# Patient Record
Sex: FEMALE | Race: BLACK OR AFRICAN AMERICAN | Employment: OTHER | URBAN - METROPOLITAN AREA
[De-identification: names, ages, dates, MRNs, and addresses within clinical notes are randomized per-mention and may not be internally consistent; named-entity substitution may affect disease eponyms.]

---

## 2017-01-20 ENCOUNTER — OFFICE VISIT (OUTPATIENT)
Dept: FAMILY MEDICINE CLINIC | Age: 67
End: 2017-01-20

## 2017-01-20 VITALS
SYSTOLIC BLOOD PRESSURE: 116 MMHG | WEIGHT: 221.25 LBS | TEMPERATURE: 97.5 F | HEIGHT: 59 IN | BODY MASS INDEX: 44.6 KG/M2 | HEART RATE: 82 BPM | DIASTOLIC BLOOD PRESSURE: 71 MMHG | RESPIRATION RATE: 16 BRPM

## 2017-01-20 DIAGNOSIS — E55.9 VITAMIN D DEFICIENCY: ICD-10-CM

## 2017-01-20 DIAGNOSIS — E78.5 HYPERLIPIDEMIA, UNSPECIFIED HYPERLIPIDEMIA TYPE: ICD-10-CM

## 2017-01-20 DIAGNOSIS — R60.0 BILATERAL EDEMA OF LOWER EXTREMITY: ICD-10-CM

## 2017-01-20 DIAGNOSIS — I10 ESSENTIAL HYPERTENSION: ICD-10-CM

## 2017-01-20 DIAGNOSIS — R73.01 ELEVATED FASTING BLOOD SUGAR: ICD-10-CM

## 2017-01-20 DIAGNOSIS — A53.9 SYPHILIS: Primary | ICD-10-CM

## 2017-01-20 DIAGNOSIS — I89.0 LYMPHEDEMA: ICD-10-CM

## 2017-01-20 DIAGNOSIS — Z51.81 MEDICATION MONITORING ENCOUNTER: ICD-10-CM

## 2017-01-20 RX ORDER — ASPIRIN 81 MG/1
TABLET ORAL DAILY
COMMUNITY
End: 2021-07-13 | Stop reason: ALTCHOICE

## 2017-01-20 NOTE — PROGRESS NOTES
HISTORY OF PRESENT ILLNESS  Leela Gómez is a 74 y.o. female. Chief Complaint   Patient presents with    Follow-up     1 month f/u on syphillis. She was seen in consult by ID.   got injections x 3 for tx for syhilis  Ongoing edema  htn chronic problem, stable. Reports compliance with meds        HPI  Past Medical History   Diagnosis Date    Asthma     Hypertension     Osteoarthritis of ankle     Right ankle pain 2008     posterior tibial tendon interstitial tear    Right foot pain 2011     hindfoot arthrosis    Sleep disorder      Current Outpatient Prescriptions   Medication Sig Dispense Refill    aspirin delayed-release 81 mg tablet Take  by mouth daily.  furosemide (LASIX) 40 mg tablet Take 1 Tab by mouth daily. 30 Tab 3    potassium chloride 20 mEq TbER Take 1 Tab by mouth daily. 30 Tab 3    lisinopril (PRINIVIL, ZESTRIL) 20 mg tablet Take 1 Tab by mouth daily. 30 Tab 3    acetaminophen (TYLENOL ARTHRITIS) 650 mg CR tablet Take 650 mg by mouth every six (6) hours as needed for Pain. No Known Allergies    ROS Respiratory: Negative for shortness of breath. Cardiovascular: Negative for chest pain. Genitourinary: Negative for frequency. Neurological: Negative for dizziness and headaches. Visit Vitals    /71 (BP 1 Location: Left arm, BP Patient Position: Sitting)    Pulse 82    Temp 97.5 °F (36.4 °C) (Oral)    Resp 16    Ht 4' 10.5\" (1.486 m)    Wt 221 lb 4 oz (100.4 kg)    BMI 45.45 kg/m2       Physical Exam  Nursing note and vitals reviewed. Constitutional: She is oriented to person, place, and time. She appears well-developed and well-nourished. No distress. HENT:   Mouth/Throat: Oropharynx is clear and moist.   Neck: No JVD present. No thyromegaly present. Cardiovascular: Normal rate, regular rhythm and normal heart sounds. Pulmonary/Chest: Effort normal and breath sounds normal. No respiratory distress. She has no wheezes. She has no rales.    Musculoskeletal: She exhibits chronic 2+ edema. Lymphadenopathy:     She has no cervical adenopathy. Psychiatric: She has a normal mood and affect. Her behavior is normal.      ASSESSMENT and PLAN    ICD-10-CM ICD-9-CM    1. Syphilis A53.9 097.9    2. Essential hypertension stable continue current medications  D01 928.6 METABOLIC PANEL, COMPREHENSIVE   3. Hyperlipidemia, unspecified hyperlipidemia type Check labs on follow up   U05.4 972.8 METABOLIC PANEL, COMPREHENSIVE      LIPID PANEL   4. Vitamin D deficiency Check labs on follow up   O33.4 079.2 METABOLIC PANEL, COMPREHENSIVE   5. Elevated fasting blood sugar H93.68 097.90 METABOLIC PANEL, COMPREHENSIVE      HEMOGLOBIN A1C W/O EAG   6. Lymphedema chronic  I89.0 457.1 REFERRAL TO VASCULAR SURGERY   7. Bilateral edema of lower extremity R60.0 782.3 REFERRAL TO VASCULAR SURGERY   8. Medication monitoring encounter O90.62 O37.85 METABOLIC PANEL, COMPREHENSIVE      LIPID PANEL   Follow-up Disposition:  Return in about 1 month (around 2/20/2017).     HEMOGLOBIN A1C W/O EAG

## 2017-01-20 NOTE — MR AVS SNAPSHOT
Visit Information Date & Time Provider Department Dept. Phone Encounter #  
 1/20/2017 10:45 AM Jace Seth MD [facility] [phone] [id number] Follow-up Instructions Return in about 1 month (around 2/20/2017). Upcoming Health Maintenance Date Due Hepatitis C Screening 1950 DTaP/Tdap/Td series (1 - Tdap) 1/17/1973 PAP AKA CERVICAL CYTOLOGY 1/17/1973 BREAST CANCER SCRN MAMMOGRAM 4/25/2016 GLAUCOMA SCREENING Q2Y 1/17/2017 OSTEOPOROSIS SCREENING (DEXA) 1/17/2017 Pneumococcal 65+ Low/Medium Risk (1 of 2 - PCV13) 1/17/2017 MEDICARE YEARLY EXAM 11/22/2017 COLONOSCOPY 8/25/2025 Allergies as of 1/20/2017  Review Complete On: 1/20/2017 By: Jace Seth MD  
 No Known Allergies Current Immunizations  Reviewed on 11/21/2016 Name Date Influenza Vaccine (Quad) PF 9/28/2016 Influenza Vaccine PF 8/26/2014 Zoster Vaccine, Live 12/3/2014 Not reviewed this visit You Were Diagnosed With   
  
 Codes Comments Syphilis    -  Primary ICD-10-CM: A53.9 ICD-9-CM: 097.9 Essential hypertension     ICD-10-CM: I10 
ICD-9-CM: 401.9 Hyperlipidemia, unspecified hyperlipidemia type     ICD-10-CM: E78.5 ICD-9-CM: 272.4 Vitamin D deficiency     ICD-10-CM: E55.9 ICD-9-CM: 268.9 Elevated fasting blood sugar     ICD-10-CM: R73.01 
ICD-9-CM: 790.21 Lymphedema     ICD-10-CM: I89.0 ICD-9-CM: 502.0 Bilateral edema of lower extremity     ICD-10-CM: R60.0 ICD-9-CM: 566. 3 Medication monitoring encounter     ICD-10-CM: Z51.81 
ICD-9-CM: V58.83 Vitals BP Pulse Temp Resp Height(growth percentile) Weight(growth percentile) 116/71 (BP 1 Location: Left arm, BP Patient Position: Sitting) 82 97.5 °F (36.4 °C) (Oral) 16 4' 10.5\" (1.486 m) 221 lb 4 oz (100.4 kg) BMI OB Status Smoking Status 45.45 kg/m2 Postmenopausal Never Smoker BMI and BSA Data Body Mass Index Body Surface Area 45.45 kg/m 2 2.04 m 2 Preferred Pharmacy Pharmacy Name Phone [facility] [address], [address] Your Updated Medication List  
  
   
This list is accurate as of: 1/20/17 11:30 AM.  Always use your most recent med list.  
  
  
  
  
 aspirin delayed-release 81 mg tablet Take  by mouth daily. furosemide 40 mg tablet Commonly known as:  LASIX Take 1 Tab by mouth daily. lisinopril 20 mg tablet Commonly known as:  Merilynn [place] Take 1 Tab by mouth daily. potassium chloride SR 20 mEq tablet Commonly known as:  K-TAB Take 1 Tab by mouth daily. TYLENOL ARTHRITIS 650 mg CR tablet Generic drug:  acetaminophen Take 650 mg by mouth every six (6) hours as needed for Pain. We Performed the Following REFERRAL TO VASCULAR SURGERY [CLR089 Custom] Comments:  
 Please evaluate patient for lymphedema. [place] near [facility] Follow-up Instructions Return in about 1 month (around 2/20/2017). To-Do List   
 04/20/2017 Lab:  HEMOGLOBIN A1C W/O EAG   
  
 04/20/2017 Lab:  LIPID PANEL   
  
 04/20/2017 Lab:  METABOLIC PANEL, COMPREHENSIVE Referral Information Referral ID Referred By Referred To  
  
 [id number] Crys Squires MD   
   [address] [facility] [address]   
   [facility] [place], [address] Phone: [phone] Fax: [phone] Visits Status Start Date End Date 1 New Request 1/20/17 1/20/18 If your referral has a status of pending review or denied, additional information will be sent to support the outcome of this decision. Patient Instructions Please contact our office if you have any questions about your visit today. Introducing [facility] & HEALTH SERVICES!    
 Manny Prajapati introduces [employer] patient portal. Now you can access parts of your medical record, email your doctor's office, and request medication refills online. 1. In your internet browser, go to [email or web address] 2. Click on the First Time User? Click Here link in the Sign In box. You will see the New Member Sign Up page. 3. Enter your [employer] Access Code exactly as it appears below. You will not need to use this code after youve completed the sign-up process. If you do not sign up before the expiration date, you must request a new code. · [employer] Access Code: [email or web address] Expires: 4/20/2017  9:37 AM 
 
4. Enter the last four digits of your Social Security Number (xxxx) and Date of Birth (mm/dd/yyyy) as indicated and click Submit. You will be taken to the next sign-up page. 5. Create a [employer] ID. This will be your [employer] login ID and cannot be changed, so think of one that is secure and easy to remember. 6. Create a [employer] password. You can change your password at any time. 7. Enter your Password Reset Question and Answer. This can be used at a later time if you forget your password. 8. Enter your e-mail address. You will receive e-mail notification when new information is available in [address] 9. Click Sign Up. You can now view and download portions of your medical record. 10. Click the Download Summary menu link to download a portable copy of your medical information. If you have questions, please visit the Frequently Asked Questions section of the [employer] website. Remember, [employer] is NOT to be used for urgent needs. For medical emergencies, dial 911. Now available from your iPhone and Android! Please provide this summary of care documentation to your next provider. Your primary care clinician is listed as RIN PINEDO. If you have any questions after today's visit, please call [phone].

## 2017-01-20 NOTE — PROGRESS NOTES
Chief Complaint   Patient presents with    Follow-up     1 month f/u on syphillis. She was seen in consult by ID. Health Maintenance reviewed     1. Have you been to the ER, urgent care clinic since your last visit? Hospitalized since your last visit? No    2. Have you seen or consulted any other health care providers outside of the [address] [place] [address] since your last visit? Include any pap smears or colon screening.  Yes When: 1/17 Where: ID Reason for visit: ov

## 2017-02-22 ENCOUNTER — OFFICE VISIT (OUTPATIENT)
Dept: FAMILY MEDICINE CLINIC | Age: 67
End: 2017-02-22

## 2017-02-22 VITALS
WEIGHT: 218.5 LBS | TEMPERATURE: 96.4 F | RESPIRATION RATE: 20 BRPM | DIASTOLIC BLOOD PRESSURE: 76 MMHG | SYSTOLIC BLOOD PRESSURE: 119 MMHG | BODY MASS INDEX: 44.05 KG/M2 | HEIGHT: 59 IN | HEART RATE: 81 BPM

## 2017-02-22 DIAGNOSIS — I10 ESSENTIAL HYPERTENSION: Primary | ICD-10-CM

## 2017-02-22 DIAGNOSIS — Z23 ENCOUNTER FOR IMMUNIZATION: ICD-10-CM

## 2017-02-22 DIAGNOSIS — E78.5 HYPERLIPIDEMIA, UNSPECIFIED HYPERLIPIDEMIA TYPE: ICD-10-CM

## 2017-02-22 DIAGNOSIS — A53.9 SYPHILIS: ICD-10-CM

## 2017-02-22 DIAGNOSIS — F99 PSYCHIATRIC DISTURBANCE: ICD-10-CM

## 2017-02-22 DIAGNOSIS — R73.03 PREDIABETES: ICD-10-CM

## 2017-02-22 DIAGNOSIS — R60.0 BILATERAL LEG EDEMA: ICD-10-CM

## 2017-02-22 NOTE — MR AVS SNAPSHOT
Visit Information Date & Time Provider Department Dept. Phone Encounter #  
 2/22/2017  9:00 AM Carson [facility]hton[phone] Follow-up Instructions Return in about 1 month (around 3/22/2017). Upcoming Health Maintenance Date Due Hepatitis C Screening 1950 DTaP/Tdap/Td series (1 - Tdap) 1/17/1973 BREAST CANCER SCRN MAMMOGRAM 4/25/2016 GLAUCOMA SCREENING Q2Y 1/17/2017 OSTEOPOROSIS SCREENING (DEXA) 1/17/2017 Pneumococcal 65+ Low/Medium Risk (1 of 2 - PCV13) 1/17/2017 MEDICARE YEARLY EXAM 11/22/2017 COLONOSCOPY 8/25/2025 Allergies as of 2/22/2017  Review Complete On: 2/22/2017 By: Carson Ashton MD  
 No Known Allergies Current Immunizations  Reviewed on 11/21/2016 Name Date Influenza Vaccine (Quad) PF 9/28/2016 Influenza Vaccine PF 8/26/2014 Zoster Vaccine, Live 12/3/2014 Not reviewed this visit You Were Diagnosed With   
  
 Codes Comments Essential hypertension    -  Primary ICD-10-CM: I10 
ICD-9-CM: 401.9 Syphilis     ICD-10-CM: A53.9 ICD-9-CM: 097.9 Psychiatric disturbance     ICD-10-CM: F99 
ICD-9-CM: 300.9 Bilateral leg edema     ICD-10-CM: R60.0 ICD-9-CM: 782.3 Prediabetes     ICD-10-CM: R73.03 
ICD-9-CM: 790.29 Hyperlipidemia, unspecified hyperlipidemia type     ICD-10-CM: E78.5 ICD-9-CM: 272.4 Vitals BP  
  
  
  
  
  
 119/76 (BP 1 Location: Right arm, BP Patient Position: Sitting) BMI and BSA Data Body Mass Index Body Surface Area 44.89 kg/m 2 2.02 m 2 Preferred Pharmacy Pharmacy Name Phone [facility] [address], [address] [facility] Your Updated Medication List  
  
   
This list is accurate as of: 2/22/17 10:15 AM.  Always use your most recent med list.  
  
  
  
  
 aspirin delayed-release 81 mg tablet Take  by mouth daily. furosemide 40 mg tablet Commonly known as:  LASIX Take 1 Tab by mouth daily. lisinopril 20 mg tablet Commonly known as:  Bre Loss Take 1 Tab by mouth daily. potassium chloride SR 20 mEq tablet Commonly known as:  K-TAB Take 1 Tab by mouth daily. TYLENOL ARTHRITIS 650 mg CR tablet Generic drug:  acetaminophen Take 650 mg by mouth every six (6) hours as needed for Pain. We Performed the Following REFERRAL TO OPHTHALMOLOGY [REF57 Custom] Comments:  
 Please evaluate patient for eye exam, cataract, glaucoma ck, htn, hyperlipidemia, prediabetes, h/o syphilis. Follow-up Instructions Return in about 1 month (around 3/22/2017). Referral Information Referral ID Referred By Referred To  
  
 [id number] Erick PINEDO [facility], [address] Phone: [phone] Fax: [phone] Visits Status Start Date End Date 1 New Request 2/22/17 2/22/18 If your referral has a status of pending review or denied, additional information will be sent to support the outcome of this decision. Patient Instructions Please contact our office if you have any questions about your visit today. Introducing [facility] & HEALTH SERVICES! Elda Smalls introduces [employer] patient portal. Now you can access parts of your medical record, email your doctor's office, and request medication refills online. 1. In your internet browser, go to [email or web address] 2. Click on the First Time User? Click Here link in the Sign In box. You will see the New Member Sign Up page. 3. Enter your [employer] Access Code exactly as it appears below. You will not need to use this code after youve completed the sign-up process. If you do not sign up before the expiration date, you must request a new code. · [employer] Access Code: [email or web address] Expires: 4/20/2017  9:37 AM 
 
 4. Enter the last four digits of your Social Security Number (xxxx) and Date of Birth (mm/dd/yyyy) as indicated and click Submit. You will be taken to the next sign-up page. 5. Create a Moon[employer]erk ID. This will be your [employer] login ID and cannot be changed, so think of one that is secure and easy to remember. 6. Create a [employer] password. You can change your password at any time. 7. Enter your Password Reset Question and Answer. This can be used at a later time if you forget your password. 8. Enter your e-mail address. You will receive e-mail notification when new information is available in [address] 9. Click Sign Up. You can now view and download portions of your medical record. 10. Click the Download Summary menu link to download a portable copy of your medical information. If you have questions, please visit the Frequently Asked Questions section of the [employer] website. Remember, [employer] is NOT to be used for urgent needs. For medical emergencies, dial 911. Now available from your iPhone and Android! Please provide this summary of care documentation to your next provider. Your primary care clinician is listed as RIN PINEDO. If you have any questions after today's visit, please call [phone].

## 2017-02-22 NOTE — PROGRESS NOTES
Patient given VIS information and signed consent form. Immunization/s administered, patient tolerated procedure well. There was no reaction noted after observed for 5 minutes.

## 2017-02-22 NOTE — PROGRESS NOTES
Chief Complaint   Patient presents with    Hypertension       Health Maintenance reviewed     1. Have you been to the ER, urgent care clinic since your last visit? Hospitalized since your last visit? No    2. Have you seen or consulted any other health care providers outside of the [address] [place] [address] since your last visit? Include any pap smears or colon screening.  No

## 2017-02-22 NOTE — PROGRESS NOTES
HISTORY OF PRESENT ILLNESS  Leela Gómez is a 74 y.o. female. Chief Complaint   Patient presents with    Hypertension chronic problem, stable    Syphilis new problem treated per ID with inj x 3  Edema ,chronic problem, stable on lasix, no leg cramping, on k Reports compliance with meds       HPI  Past Medical History:   Diagnosis Date    Asthma     Hypertension     Osteoarthritis of ankle     Right ankle pain 2008    posterior tibial tendon interstitial tear    Right foot pain 2011    hindfoot arthrosis    Sleep disorder      Current Outpatient Prescriptions   Medication Sig Dispense Refill    aspirin delayed-release 81 mg tablet Take  by mouth daily.  furosemide (LASIX) 40 mg tablet Take 1 Tab by mouth daily. 30 Tab 3    potassium chloride 20 mEq TbER Take 1 Tab by mouth daily. 30 Tab 3    lisinopril (PRINIVIL, ZESTRIL) 20 mg tablet Take 1 Tab by mouth daily. 30 Tab 3    acetaminophen (TYLENOL ARTHRITIS) 650 mg CR tablet Take 650 mg by mouth every six (6) hours as needed for Pain. No Known Allergies    ROS Respiratory: Negative for shortness of breath. Cardiovascular: Negative for chest pain. Genitourinary: Negative for frequency. Neurological: Negative for dizziness and headaches. Visit Vitals    /76 (BP 1 Location: Right arm, BP Patient Position: Sitting)    Pulse 81    Temp 96.4 °F (35.8 °C) (Oral)    Resp 20    Ht 4' 10.5\" (1.486 m)    Wt 218 lb 8 oz (99.1 kg)    BMI 44.89 kg/m2       Physical Exam Nursing note and vitals reviewed. Constitutional: She is oriented to person, place, and time. She appears well-developed and well-nourished. No distress. HENT:   Mouth/Throat: Oropharynx is clear and moist.   Neck: No JVD present. No thyromegaly present. Cardiovascular: Normal rate, regular rhythm and normal heart sounds. Pulmonary/Chest: Effort normal and breath sounds normal. No respiratory distress. She has no wheezes. She has no rales.    Musculoskeletal: She exhibits 2+ edema. Lymphadenopathy:     She has no cervical adenopathy. Neurological: She is alert and oriented to person, place, and time. Coordination normal.   Psychiatric: She has a normal mood and affect. Her behavior is normal.    Results for orders placed or performed in visit on 09/28/16   CBC WITH AUTOMATED DIFF   Result Value Ref Range    WBC 3.4 (L) 4.0 - 11.0 K/uL    RBC 5.04 3.80 - 5.20 M/uL    HGB 11.6 (L) 11.7 - 16.0 g/dL    HCT 37.4 35.1 - 48.0 %    MCV 74 (L) 80 - 95 fL    MCH 23 (L) 26 - 34 pg    MCHC 31 (L) 32 - 36 g/dL    RDW 15.8 10.0 - 16.0 %    PLATELET 791 721 - 405 K/uL    MPV 10.6 6.0 - 10.8 fL    NEUTROPHILS 54 40 - 75 %    Lymphocytes 35 27 - 45 %    MONOCYTES 8 3 - 9 %    EOSINOPHILS 3 0 - 6 %    BASOPHILS 0 0 - 2 %    ABS. NEUTROPHILS 1.8 1.8 - 7.7 K/uL    ABSOLUTE LYMPHOCYTE COUNT 1.2 1.0 - 4.8 K/uL    ABS. MONOCYTES 0.3 0.1 - 0.9 K/uL    ABS. EOSINOPHILS 0.1 0.0 - 0.5 K/uL    ABS. BASOPHILS 0.0 0.0 - 0.2 K/uL   METABOLIC PANEL, COMPREHENSIVE   Result Value Ref Range    Glucose 91 65 - 99 mg/dL    BUN 20 6 - 22 mg/dL    Creatinine 0.8 0.8 - 1.4 mg/dL    Sodium 143 133 - 145 mmol/L    Potassium 3.6 3.5 - 5.5 mmol/L    Chloride 106 98 - 110 mmol/L    CO2 25 20 - 32 mmol/L    AST (SGOT) 16 10 - 37 U/L    ALT (SGPT) 14 5 - 40 U/L    Alk.  phosphatase 86 40 - 120 U/L    Bilirubin, total 0.3 0.2 - 1.2 mg/dL    Calcium 8.9 8.4 - 10.5 mg/dL    Protein, total 7.3 6.2 - 8.1 g/dL    Albumin 3.9 3.5 - 5.0 g/dL    A-G Ratio 1.1 1.1 - 2.6 ratio    Globulin 3.4 2.0 - 4.0 g/dL    Anion gap 11.9 mmol/L    GFRAA >60.0 >60.0    GFRNA >60.0 >60.0   LIPID PANEL   Result Value Ref Range    Triglyceride 51 40 - 149 mg/dL    HDL Cholesterol 84 (H) 40 - 59 mg/dL    Cholesterol, total 224 (H) 110 - 200 mg/dL    LDL, calculated 130 (H) 50 - 99 mg/dL    VLDL, calculated 10 8 - 30 mg/dL   VITAMIN D, 25 HYDROXY   Result Value Ref Range    VITAMIN D, 25-HYDROXY 32.1 32.0 - 100.0 ng/mL   TSH, 3RD GENERATION Result Value Ref Range    TSH 1.09 0.27 - 4.20 mcU/mL   HEMOGLOBIN A1C W/O EAGSTIMATION   Result Value Ref Range    Hemoglobin A1c 6.4 (H) 4.8 - 5.9 %    AVG  (H) 91 - 123 mg/dL       ASSESSMENT and PLAN    ICD-10-CM ICD-9-CM    1. Essential hypertension . stable continue current medications  I10 401.9 REFERRAL TO OPHTHALMOLOGY   2. Syphilis treated ,asymptomatic at present A53.9 097.9    3. Psychiatric disturbance stable at present no active sx F99 300.9    4. Bilateral leg edema chronic stable R60.0 782.3    5. Prediabetes R73.03 790.29 REFERRAL TO OPHTHALMOLOGY   6. Hyperlipidemia, unspecified hyperlipidemia type Check labs on follow up   E78.5 272.4 REFERRAL TO OPHTHALMOLOGY   7. Encounter for immunization Z23 V03.89 PNEUMOCOCCAL CONJ VACCINE 13 VALENT IM      ADMIN PNEUMOCOCCAL VACCINE   Follow-up Disposition:  Return in about 1 month (around 3/22/2017).  Check labs on follow up  Ordered FLP CMP HBA1C

## 2017-03-07 ENCOUNTER — HOSPITAL ENCOUNTER (OUTPATIENT)
Dept: LAB | Age: 67
Discharge: HOME | End: 2017-03-07
Payer: MEDICARE

## 2017-03-07 DIAGNOSIS — R73.01 ELEVATED FASTING BLOOD SUGAR: ICD-10-CM

## 2017-03-07 DIAGNOSIS — E55.9 VITAMIN D DEFICIENCY: ICD-10-CM

## 2017-03-07 DIAGNOSIS — I10 ESSENTIAL HYPERTENSION: ICD-10-CM

## 2017-03-07 DIAGNOSIS — E78.5 HYPERLIPIDEMIA, UNSPECIFIED HYPERLIPIDEMIA TYPE: ICD-10-CM

## 2017-03-07 DIAGNOSIS — Z51.81 MEDICATION MONITORING ENCOUNTER: ICD-10-CM

## 2017-03-07 LAB
ALBUMIN SERPL BCP-MCNC: 3.4 G/DL (ref 3.4–5)
ALBUMIN/GLOB SERPL: 0.9 {RATIO} (ref 0.8–1.7)
ALP SERPL-CCNC: 102 U/L (ref 45–117)
ALT SERPL-CCNC: 27 U/L (ref 13–56)
ANION GAP BLD CALC-SCNC: 8 MMOL/L (ref 3–18)
AST SERPL W P-5'-P-CCNC: 17 U/L (ref 15–37)
BILIRUB SERPL-MCNC: 0.3 MG/DL (ref 0.2–1)
BUN SERPL-MCNC: 17 MG/DL (ref 7–18)
BUN/CREAT SERPL: 16 (ref 12–20)
CALCIUM SERPL-MCNC: 8.9 MG/DL (ref 8.5–10.1)
CHLORIDE SERPL-SCNC: 111 MMOL/L (ref 100–108)
CHOLEST SERPL-MCNC: 210 MG/DL
CO2 SERPL-SCNC: 26 MMOL/L (ref 21–32)
CREAT SERPL-MCNC: 1.05 MG/DL (ref 0.6–1.3)
GLOBULIN SER CALC-MCNC: 3.6 G/DL (ref 2–4)
GLUCOSE SERPL-MCNC: 84 MG/DL (ref 74–99)
HBA1C MFR BLD: 6.4 % (ref 4.2–5.6)
HDLC SERPL-MCNC: 77 MG/DL (ref 40–60)
HDLC SERPL: 2.7 {RATIO} (ref 0–5)
LDLC SERPL CALC-MCNC: 117.2 MG/DL (ref 0–100)
LIPID PROFILE,FLP: ABNORMAL
POTASSIUM SERPL-SCNC: 4 MMOL/L (ref 3.5–5.5)
PROT SERPL-MCNC: 7 G/DL (ref 6.4–8.2)
SODIUM SERPL-SCNC: 145 MMOL/L (ref 136–145)
TRIGL SERPL-MCNC: 79 MG/DL (ref ?–150)
VLDLC SERPL CALC-MCNC: 15.8 MG/DL

## 2017-03-07 PROCEDURE — 80053 COMPREHEN METABOLIC PANEL: CPT | Performed by: FAMILY MEDICINE

## 2017-03-07 PROCEDURE — 80061 LIPID PANEL: CPT | Performed by: FAMILY MEDICINE

## 2017-03-07 PROCEDURE — 36415 COLL VENOUS BLD VENIPUNCTURE: CPT | Performed by: FAMILY MEDICINE

## 2017-03-07 PROCEDURE — 83036 HEMOGLOBIN GLYCOSYLATED A1C: CPT | Performed by: FAMILY MEDICINE

## 2017-03-22 ENCOUNTER — OFFICE VISIT (OUTPATIENT)
Dept: FAMILY MEDICINE CLINIC | Age: 67
End: 2017-03-22

## 2017-03-22 VITALS
HEART RATE: 81 BPM | WEIGHT: 226.25 LBS | BODY MASS INDEX: 45.61 KG/M2 | HEIGHT: 59 IN | RESPIRATION RATE: 20 BRPM | SYSTOLIC BLOOD PRESSURE: 120 MMHG | DIASTOLIC BLOOD PRESSURE: 74 MMHG | TEMPERATURE: 97.1 F

## 2017-03-22 DIAGNOSIS — A53.0 LATENT SYPHILIS: ICD-10-CM

## 2017-03-22 DIAGNOSIS — E78.5 HYPERLIPIDEMIA, UNSPECIFIED HYPERLIPIDEMIA TYPE: Primary | ICD-10-CM

## 2017-03-22 DIAGNOSIS — I10 ESSENTIAL HYPERTENSION: ICD-10-CM

## 2017-03-22 DIAGNOSIS — I89.0 LYMPHEDEMA: ICD-10-CM

## 2017-03-22 DIAGNOSIS — R73.03 PREDIABETES: ICD-10-CM

## 2017-03-22 NOTE — PROGRESS NOTES
HISTORY OF PRESENT ILLNESS  Leela Gómez is a 74 y.o. female. 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  furosemide (LASIX) 40 mg tablet Take 1 Tab by mouth daily. 30 Tab 3    potassium chloride 20 mEq TbER Take 1 Tab by mouth daily. 30 Tab 3    lisinopril (PRINIVIL, ZESTRIL) 20 mg tablet Take 1 Tab by mouth daily. 30 Tab 3    acetaminophen (TYLENOL ARTHRITIS) 650 mg CR tablet Take 650 mg by mouth every six (6) hours as needed for Pain. No Known Allergies    ROS Respiratory: Negative for shortness of breath. Cardiovascular: Negative for chest pain. Genitourinary: Negative for frequency. Neurological: Negative for dizziness and headaches. Visit Vitals    /74 (BP 1 Location: Left arm, BP Patient Position: Sitting)    Pulse 81    Temp 97.1 °F (36.2 °C) (Oral)    Resp 20    Ht 4' 10.5\" (1.486 m)    Wt 226 lb 4 oz (102.6 kg)    BMI 46.48 kg/m2       Physical Exam  Nursing note and vitals reviewed. Constitutional: She is oriented to person, place, and time. She appears well-developed and well-nourished. No distress. HENT:   Mouth/Throat: Oropharynx is clear and moist.   Neck: No JVD present. No thyromegaly present. Cardiovascular: Normal rate, regular rhythm and normal heart sounds.     Pulmonary/Chest: Effort normal and breath sounds normal. No respiratory distress. She has no wheezes. She has no rales. Musculoskeletal: She exhibits 2+ edema. Lymphadenopathy:     She has no cervical adenopathy. Neurological: She is alert and oriented to person, place, and time. Coordination normal.   Psychiatric: She has a normal mood and affect. Her behavior is normal.       ASSESSMENT and PLAN    ICD-10-CM ICD-9-CM    1. Hyperlipidemia, unspecified hyperlipidemia type E78.5 272.4 uncontrolled but improved work on diet   2. Essential hypertension I10 401.9  stable continue current medications    3. Prediabetes  R73.03 790.29  diabetes mellitus risk watch diet monitor   4. 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 Lymphedema I89.0 457.1 Refused vascular referral, on lasix, monitor   Follow-up Disposition:  Return in about 3 months (around 6/22/2017).

## 2017-03-22 NOTE — MR AVS SNAPSHOT
Visit Information Date & Time Provider Department Dept. Phone Encounter #  
 3/22/2017  8:45 AM Demetri Waldron MD [address] [phone] Follow-up Instructions Return in about 3 months (around 6/22/2017). Upcoming Health Maintenance Date Due Hepatitis C Screening 1950 DTaP/Tdap/Td series (1 - Tdap) 1/17/1973 BREAST CANCER SCRN MAMMOGRAM 4/25/2016 GLAUCOMA SCREENING Q2Y 1/17/2017 OSTEOPOROSIS SCREENING (DEXA) 1/17/2017 MEDICARE YEARLY EXAM 11/22/2017 Pneumococcal 65+ Low/Medium Risk (2 of 2 - PPSV23) 2/22/2018 COLONOSCOPY 8/25/2025 Allergies as of 3/22/2017  Review Complete On: 2/22/2017 By: Demetri Waldron MD  
 No Known Allergies Current Immunizations  Reviewed on 11/21/2016 Name Date Influenza Vaccine (Quad) PF 9/28/2016 Influenza Vaccine PF 8/26/2014 Pneumococcal Conjugate (PCV-13) 2/22/2017 Zoster Vaccine, Live 12/3/2014 Not reviewed this visit You Were Diagnosed With   
  
 Codes Comments Hyperlipidemia, unspecified hyperlipidemia type    -  Primary ICD-10-CM: E78.5 ICD-9-CM: 272.4 Essential hypertension     ICD-10-CM: I10 
ICD-9-CM: 401.9 Prediabetes     ICD-10-CM: R73.03 
ICD-9-CM: 790.29 Latent syphilis     ICD-10-CM: A53.0 ICD-9-CM: 097.1 Lymphedema     ICD-10-CM: I89.0 ICD-9-CM: 567.3 Vitals BP Pulse Temp Resp Height(growth percentile) Weight(growth percentile) 120/74 (BP 1 Location: Left arm, BP Patient Position: Sitting) 81 97.1 °F (36.2 °C) (Oral) 20 4' 10.5\" (1.486 m) 226 lb 4 oz (102.6 kg) BMI OB Status Smoking Status 46.48 kg/m2 Postmenopausal Never Smoker BMI and BSA Data Body Mass Index Body Surface Area  
 46.48 kg/m 2 2.06 m 2 Preferred Pharmacy Pharmacy Name Phone [facility] [address], [address] [facility] Your Updated Medication List  
  
   
 This list is accurate as of: 3/22/17  9:50 AM.  Always use your most recent med list.  
  
  
  
  
 aspirin delayed-release 81 mg tablet Take  by mouth daily. furosemide 40 mg tablet Commonly known as:  LASIX Take 1 Tab by mouth daily. lisinopril 20 mg tablet Commonly known as:  Julio Shutters Take 1 Tab by mouth daily. potassium chloride SR 20 mEq tablet Commonly known as:  K-TAB Take 1 Tab by mouth daily. TYLENOL ARTHRITIS 650 mg CR tablet Generic drug:  acetaminophen Take 650 mg by mouth every six (6) hours as needed for Pain. Follow-up Instructions Return in about 3 months (around 6/22/2017). Patient Instructions Please contact our office if you have any questions about your visit today. Introducing [facility] & [facility]! [employer] introduces [employer] patient portal. Now you can access parts of your medical record, email your doctor's office, and request medication refills online. 1. In your internet browser, go to [email or web address] 2. Click on the First Time User? Click Here link in the Sign In box. You will see the New Member Sign Up page. 3. Enter your [employer] Access Code exactly as it appears below. You will not need to use this code after youve completed the sign-up process. If you do not sign up before the expiration date, you must request a new code. · [employer] Access Code: [email or web address] Expires: 4/20/2017 10:37 AM 
 
4. Enter the last four digits of your Social Security Number (xxxx) and Date of Birth (mm/dd/yyyy) as indicated and click Submit. You will be taken to the next sign-up page. 5. Create a [employer] ID. This will be your [employer] login ID and cannot be changed, so think of one that is secure and easy to remember. 6. Create a [employer] password. You can change your password at any time. 7. Enter your Password Reset Question and Answer.  This can be used at a later time if you forget your password. 8. Enter your e-mail address. You will receive e-mail notification when new information is available in [address] 9. Click Sign Up. You can now view and download portions of your medical record. 10. Click the Download Summary menu link to download a portable copy of your medical information. If you have questions, please visit the Frequently Asked Questions section of the Avaz website. Remember, [employer] is NOT to be used for urgent needs. For medical emergencies, dial 911. Now available from your iPhone and Android! Please provide this summary of care documentation to your next provider. Your primary care clinician is listed as RIN PINEDO. If you have any questions after today's visit, please call [phone].

## 2017-03-22 NOTE — PROGRESS NOTES
Chief Complaint   Patient presents with    Results     discuss lab results       Health Maintenance reviewed     1. Have you been to the ER, urgent care clinic since your last visit? Hospitalized since your last visit? No    2. Have you seen or consulted any other health care providers outside of the [address] [place] [address] since your last visit? Include any pap smears or colon screening.  Yes When: 3/17 Where: [place] Reason for visit: ov

## 2017-04-20 ENCOUNTER — OFFICE VISIT (OUTPATIENT)
Dept: FAMILY MEDICINE CLINIC | Age: 67
End: 2017-04-20

## 2017-04-20 VITALS
TEMPERATURE: 98.4 F | HEART RATE: 99 BPM | WEIGHT: 227.25 LBS | SYSTOLIC BLOOD PRESSURE: 116 MMHG | RESPIRATION RATE: 20 BRPM | BODY MASS INDEX: 45.81 KG/M2 | HEIGHT: 59 IN | DIASTOLIC BLOOD PRESSURE: 73 MMHG

## 2017-04-20 DIAGNOSIS — N39.0 URINARY TRACT INFECTION WITHOUT HEMATURIA, SITE UNSPECIFIED: ICD-10-CM

## 2017-04-20 DIAGNOSIS — E87.6 DIURETIC-INDUCED HYPOKALEMIA: ICD-10-CM

## 2017-04-20 DIAGNOSIS — R60.0 BILATERAL LEG EDEMA: ICD-10-CM

## 2017-04-20 DIAGNOSIS — T50.2X5A DIURETIC-INDUCED HYPOKALEMIA: ICD-10-CM

## 2017-04-20 DIAGNOSIS — I10 ESSENTIAL HYPERTENSION: ICD-10-CM

## 2017-04-20 DIAGNOSIS — F25.0 SCHIZOAFFECTIVE DISORDER, BIPOLAR TYPE (HCC): Primary | ICD-10-CM

## 2017-04-20 LAB
BILIRUB UR QL STRIP: NEGATIVE
GLUCOSE UR-MCNC: NEGATIVE MG/DL
KETONES P FAST UR STRIP-MCNC: NEGATIVE MG/DL
PH UR STRIP: 5.5 [PH] (ref 4.6–8)
PROT UR QL STRIP: NEGATIVE MG/DL
SP GR UR STRIP: 1.01 (ref 1–1.03)
UA UROBILINOGEN AMB POC: NORMAL (ref 0.2–1)
URINALYSIS CLARITY POC: CLEAR
URINALYSIS COLOR POC: YELLOW
URINE BLOOD POC: NEGATIVE
URINE LEUKOCYTES POC: NEGATIVE
URINE NITRITES POC: NEGATIVE

## 2017-04-20 RX ORDER — POTASSIUM CHLORIDE 1500 MG/1
20 TABLET, FILM COATED, EXTENDED RELEASE ORAL DAILY
Qty: 30 TAB | Refills: 3 | Status: SHIPPED | OUTPATIENT
Start: 2017-04-20 | End: 2018-01-17 | Stop reason: SDUPTHER

## 2017-04-20 NOTE — PROGRESS NOTES
Chief Complaint   Patient presents with   [facility] Follow Up     Seen at [facility] ER on 4/3/17 for schizphrenic episode       Health Maintenance reviewed     1. Have you been to the ER, urgent care clinic since your last visit? Hospitalized since your last visit? Yes When: 4/17 Where: [facility] ER Reason for visit: schizophrenia    2. Have you seen or consulted any other health care providers outside of the [address] [place] [address] since your last visit? Include any pap smears or colon screening.  No

## 2017-04-20 NOTE — MR AVS SNAPSHOT
Visit Information Date & Time Provider Department Dept. Phone Encounter #  
 4/20/2017  3:00 PM Fabricio Sanders MD [address] 02 [phone] Follow-up Instructions Return in about 1 month (around 5/20/2017). Your Appointments 6/22/2017 10:00 AM  
Follow Up with Fabricio Sanders MD  
[address] (--) Appt Note: bharath Rojas [address] [address] [address] [address] [phone]  
  
   
 [place] [address] [phone] [address] [address] Upcoming Health Maintenance Date Due Hepatitis C Screening 1950 DTaP/Tdap/Td series (1 - Tdap) 1/17/1973 BREAST CANCER SCRN MAMMOGRAM 4/25/2016 GLAUCOMA SCREENING Q2Y 1/17/2017 OSTEOPOROSIS SCREENING (DEXA) 1/17/2017 MEDICARE YEARLY EXAM 11/22/2017 Pneumococcal 65+ Low/Medium Risk (2 of 2 - PPSV23) 2/22/2018 COLONOSCOPY 8/25/2025 Allergies as of 4/20/2017  Review Complete On: 4/20/2017 By: Fabricio Sanders MD  
 No Known Allergies Current Immunizations  Reviewed on 11/21/2016 Name Date Influenza Vaccine (Quad) PF 9/28/2016 Influenza Vaccine PF 8/26/2014 Pneumococcal Conjugate (PCV-13) 2/22/2017 Zoster Vaccine, Live 12/3/2014 Not reviewed this visit You Were Diagnosed With   
  
 Codes Comments Schizoaffective disorder, bipolar type ([facility]    -  Primary ICD-10-CM: F25.0 ICD-9-CM: 295.70 Bilateral leg edema     ICD-10-CM: R60.0 ICD-9-CM: 782.3 Urinary tract infection without hematuria, site unspecified     ICD-10-CM: N39.0 ICD-9-CM: 599.0 Diuretic-induced hypokalemia     ICD-10-CM: E87.6, T50.2X5A 
ICD-9-CM: 276.8, E944.4 Essential hypertension     ICD-10-CM: I10 
ICD-9-CM: 401.9 Vitals BP Pulse Temp Resp Height(growth percentile) Weight(growth percentile) 116/73 (BP 1 Location: Right arm, BP Patient Position: Sitting) 99 98.4 °F (36.9 °C) (Oral) 20 4' 10.5\" (1.486 m) 227 lb 4 oz (103.1 kg) BMI OB Status Smoking Status 46.69 kg/m2 Postmenopausal Never Smoker BMI and BSA Data Body Mass Index Body Surface Area  
 46.69 kg/m 2 2.06 m 2 Preferred Pharmacy Pharmacy Name Phone [facility] [address], [address] Your Updated Medication List  
  
   
This list is accurate as of: 4/20/17  4:05 PM.  Always use your most recent med list.  
  
  
  
  
 aspirin delayed-release 81 mg tablet Take  by mouth daily. furosemide 40 mg tablet Commonly known as:  LASIX Take 1 Tab by mouth daily. lisinopril 20 mg tablet Commonly known as:  Rigo  Take 1 Tab by mouth daily. potassium chloride SR 20 mEq tablet Commonly known as:  K-TAB Take 1 Tab by mouth daily. TYLENOL ARTHRITIS 650 mg CR tablet Generic drug:  acetaminophen Take 650 mg by mouth every six (6) hours as needed for Pain. Prescriptions Sent to Pharmacy Refills  
 potassium chloride SR (K-TAB) 20 mEq tablet 3 Sig: Take 1 Tab by mouth daily. Class: Normal  
 Pharmacy: [facility] [address] Ph #: [phone] Route: Oral  
  
We Performed the Following AMB POC URINALYSIS DIP STICK AUTO W/O MICRO [00787 CPT(R)] Follow-up Instructions Return in about 1 month (around 5/20/2017). To-Do List   
 04/20/2017 Microbiology:  CULTURE, URINE Patient Instructions Please contact our office if you have any questions about your visit today. Introducing [address]! [employer] introduces [employer] patient portal. Now you can access parts of your medical record, email your doctor's office, and request medication refills online. 1. In your internet browser, go to [email or web address] 2. Click on the First Time User? Click Here link in the Sign In box. You will see the New Member Sign Up page. 3. Enter your [employer] Access Code exactly as it appears below. You will not need to use this code after youve completed the sign-up process. If you do not sign up before the expiration date, you must request a new code. · [employer] Access Code: [email or web address] Expires: 7/19/2017  4:05 PM 
 
4. Enter the last four digits of your Social Security Number (xxxx) and Date of Birth (mm/dd/yyyy) as indicated and click Submit. You will be taken to the next sign-up page. 5. Create a [employer]emanta ID. This will be your Zemanta login ID and cannot be changed, so think of one that is secure and easy to remember. 6. Create a [employer] password. You can change your password at any time. 7. Enter your Password Reset Question and Answer. This can be used at a later time if you forget your password. 8. Enter your e-mail address. You will receive e-mail notification when new information is available in [address] 9. Click Sign Up. You can now view and download portions of your medical record. 10. Click the Download Summary menu link to download a portable copy of your medical information. If you have questions, please visit the Frequently Asked Questions section of the Zemanta website. Remember, Zemanta is NOT to be used for urgent needs. For medical emergencies, dial 911. Now available from your iPhone and Android! Please provide this summary of care documentation to your next provider. Your primary care clinician is listed as RIN PINEDO. If you have any questions after today's visit, please call [phone].

## 2017-04-20 NOTE — PROGRESS NOTES
HISTORY OF PRESENT ILLNESS  Leela Gómez is a 74 y.o. female. Chief Complaint   Patient presents with   [facility] Follow Up     Seen at [place] [address] ER on 4/3/17 for schizphrenic episode    patient was taken to the emergency room by her family who states that she has severe problems with psychiatric issues. She was seen at [facility] found to have a urinary tract infection started on Keflex but subsequently transferred to [facility] where she stayed for 12 days. She was discharged with a diagnosis of schizoaffective disorder but no new medications were prescribed  I last refilled her medications many months ago and she still has medication and referrals left all the bottles that she brings in today. She is complaining of increased swelling but her Lasix still has 2 referrals on it refilled many months ago. 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  furosemide (LASIX) 40 mg tablet Take 1 Tab by mouth daily. 30 Tab 3    potassium chloride 20 mEq TbER Take 1 Tab by mouth daily. 30 Tab 3    lisinopril (PRINIVIL, ZESTRIL) 20 mg tablet Take 1 Tab by mouth daily. 30 Tab 3    acetaminophen (TYLENOL ARTHRITIS) 650 mg CR tablet Take 650 mg by mouth every six (6) hours as needed for Pain. No Known Allergies    Review of Systems   Respiratory: Negative for shortness of breath. Cardiovascular: Positive for leg swelling. Psychiatric/Behavioral: Negative for depression. The patient is not nervous/anxious.       Visit Vitals    /73 (BP 1 Location: Right arm, BP Patient Position: Sitting)    Pulse 99    Temp 98.4 °F (36.9 °C) (Oral)  Resp 20    Ht 4' 10.5\" (1.486 m)    Wt 227 lb 4 oz (103.1 kg)    BMI 46.69 kg/m2       Physical Exam   Constitutional: She appears well-developed and well-nourished. No distress. HENT:   Mouth/Throat: Oropharynx is clear and moist.   Neck: No JVD present. Cardiovascular: Normal rate, regular rhythm and normal heart sounds. Pulmonary/Chest: Effort normal and breath sounds normal. No respiratory distress. She has no wheezes. She has no rales. Musculoskeletal: She exhibits edema (2-3+ ankle and pedal). She exhibits no tenderness. Lymphadenopathy:     She has no cervical adenopathy. Neurological: Coordination normal.   Psychiatric: She has a normal mood and affect. Her behavior is normal.   Nursing note and vitals reviewed. Results for orders placed or performed in visit on 04/20/17   AMB POC URINALYSIS DIP STICK AUTO W/O MICRO   Result Value Ref Range    Color (UA POC) Yellow     Clarity (UA POC) Clear     Glucose (UA POC) Negative Negative    Bilirubin (UA POC) Negative Negative    Ketones (UA POC) Negative Negative    Specific gravity (UA POC) 1.010 1.001 - 1.035    Blood (UA POC) Negative Negative    pH (UA POC) 5.5 4.6 - 8.0    Protein (UA POC) Negative Negative mg/dL    Urobilinogen (UA POC) 0.2 mg/dL 0.2 - 1    Nitrites (UA POC) Negative Negative    Leukocyte esterase (UA POC) Negative Negative         ASSESSMENT and PLAN    ICD-10-CM ICD-9-CM    1. Schizoaffective disorder, bipolar type (HCC)Patient refusing psychiatry referral or appointments F25.0 295.70    2. Bilateral leg edema uncontrolled encourage compliance with medication apparently she has not been been taking her medications she should take her Lasix once daily along with her potassium    R60.0 782.3    3. Urinary tract infection without hematuria, site unspecified resolved posttreatment   N39.0 599.0 AMB POC URINALYSIS DIP STICK AUTO W/O MICRO      CANCELED: CULTURE, URINE   4.  Diuretic-induced hypokalemia E87.6 276.8 potassium chloride SR (K-TAB) 20 mEq tablet    T50.2X5A E944.4    5. Essential hypertension noncompliance with medical regimen again encourage compliance with medications. stable continue current medications   I10 401.9 potassium chloride SR (K-TAB) 20 mEq tablet   Patient is alert oriented and refusing any psychiatry treatment or referral  Follow-up Disposition:  Return in about 1 month (around 5/20/2017).

## 2017-06-22 ENCOUNTER — OFFICE VISIT (OUTPATIENT)
Dept: FAMILY MEDICINE CLINIC | Age: 67
End: 2017-06-22

## 2017-06-22 VITALS
HEIGHT: 59 IN | WEIGHT: 217 LBS | HEART RATE: 79 BPM | BODY MASS INDEX: 43.75 KG/M2 | RESPIRATION RATE: 16 BRPM | TEMPERATURE: 97.7 F | SYSTOLIC BLOOD PRESSURE: 114 MMHG | DIASTOLIC BLOOD PRESSURE: 65 MMHG

## 2017-06-22 DIAGNOSIS — R73.03 PREDIABETES: ICD-10-CM

## 2017-06-22 DIAGNOSIS — R60.0 BILATERAL LEG EDEMA: ICD-10-CM

## 2017-06-22 DIAGNOSIS — F29 PSYCHOSIS, UNSPECIFIED PSYCHOSIS TYPE (HCC): Primary | ICD-10-CM

## 2017-06-22 DIAGNOSIS — Z12.31 ENCOUNTER FOR SCREENING MAMMOGRAM FOR BREAST CANCER: ICD-10-CM

## 2017-06-22 DIAGNOSIS — I10 ESSENTIAL HYPERTENSION: ICD-10-CM

## 2017-06-22 DIAGNOSIS — E55.9 VITAMIN D DEFICIENCY: ICD-10-CM

## 2017-06-22 DIAGNOSIS — R73.01 ELEVATED FASTING BLOOD SUGAR: ICD-10-CM

## 2017-06-22 DIAGNOSIS — Z11.59 NEED FOR HEPATITIS C SCREENING TEST: ICD-10-CM

## 2017-06-22 DIAGNOSIS — E78.5 HYPERLIPIDEMIA, UNSPECIFIED HYPERLIPIDEMIA TYPE: ICD-10-CM

## 2017-06-22 DIAGNOSIS — Z51.81 MEDICATION MONITORING ENCOUNTER: ICD-10-CM

## 2017-06-22 RX ORDER — LISINOPRIL 20 MG/1
20 TABLET ORAL DAILY
Qty: 30 TAB | Refills: 3 | Status: SHIPPED | OUTPATIENT
Start: 2017-06-22 | End: 2017-08-02 | Stop reason: SDUPTHER

## 2017-06-22 RX ORDER — FUROSEMIDE 40 MG/1
40 TABLET ORAL DAILY
Qty: 30 TAB | Refills: 3 | Status: SHIPPED | OUTPATIENT
Start: 2017-06-22 | End: 2017-10-11 | Stop reason: SDUPTHER

## 2017-06-22 NOTE — PROGRESS NOTES
Chief Complaint   Patient presents with    Other     schizoprenia    Hypertension    Swelling     edema, lower legs    Other     request order for mammogram       Health Maintenance Due   Topic Date Due    Hepatitis C Screening  1950    DTaP/Tdap/Td series (1 - Tdap) 01/17/1973    BREAST CANCER SCRN MAMMOGRAM  04/25/2016    GLAUCOMA SCREENING Q2Y  01/17/2017    OSTEOPOROSIS SCREENING (DEXA)  01/17/2017       Health Maintenance reviewed     1. Have you been to the ER, urgent care clinic since your last visit? Hospitalized since your last visit? Yes When: 6/17 [facility] Reason for visit: psych    2. Have you seen or consulted any other health care providers outside of the [facility] since your last visit? Include any pap smears or colon screening.  No

## 2017-06-22 NOTE — MR AVS SNAPSHOT
Visit Information Date & Time Provider Department Dept. Phone Encounter #  
 6/22/2017 10:00 AM Heidi Granados MD [phone] Follow-up Instructions Return in about 6 weeks (around 8/3/2017). Upcoming Health Maintenance Date Due Hepatitis C Screening 1950 DTaP/Tdap/Td series (1 - Tdap) 1/17/1973 BREAST CANCER SCRN MAMMOGRAM 4/25/2016 GLAUCOMA SCREENING Q2Y 1/17/2017 OSTEOPOROSIS SCREENING (DEXA) 1/17/2017 INFLUENZA AGE 9 TO ADULT 8/1/2017 MEDICARE YEARLY EXAM 11/22/2017 Pneumococcal 65+ Low/Medium Risk (2 of 2 - PPSV23) 2/22/2018 COLONOSCOPY 8/25/2025 Allergies as of 6/22/2017  Review Complete On: 6/22/2017 By: Heidi Granados MD  
 No Known Allergies Current Immunizations  Reviewed on 11/21/2016 Name Date Influenza Vaccine (Quad) PF 9/28/2016 Influenza Vaccine PF 8/26/2014 Pneumococcal Conjugate (PCV-13) 2/22/2017 Zoster Vaccine, Live 12/3/2014 Not reviewed this visit You Were Diagnosed With   
  
 Codes Comments Psychosis, unspecified psychosis type    -  Primary ICD-10-CM: F29 
ICD-9-CM: 298.9 Essential hypertension     ICD-10-CM: I10 
ICD-9-CM: 401.9 Bilateral leg edema     ICD-10-CM: R60.0 ICD-9-CM: 782.3 Prediabetes     ICD-10-CM: R73.03 
ICD-9-CM: 790.29 Hyperlipidemia, unspecified hyperlipidemia type     ICD-10-CM: E78.5 ICD-9-CM: 272.4 Vitamin D deficiency     ICD-10-CM: E55.9 ICD-9-CM: 268.9 Medication monitoring encounter     ICD-10-CM: Z51.81 
ICD-9-CM: V58.83 Need for hepatitis C screening test     ICD-10-CM: Z11.59 
ICD-9-CM: V73.89 Encounter for screening mammogram for breast cancer     ICD-10-CM: Z12.31 
ICD-9-CM: V76.12 Elevated fasting blood sugar     ICD-10-CM: R73.01 
ICD-9-CM: 790.21 Vitals BP Pulse Temp Resp Height(growth percentile) Weight(growth percentile) 114/65 (BP 1 Location: Left arm, BP Patient Position: Sitting) 79 97.7 °F (36.5 °C) (Oral) 16 4' 10.5\" (1.486 m) 217 lb (98.4 kg) BMI OB Status Smoking Status 44.58 kg/m2 Postmenopausal Never Smoker BMI and BSA Data Body Mass Index Body Surface Area 44.58 kg/m 2 2.02 m 2 Preferred Pharmacy Pharmacy Name Phone [facility] [address], [address] Your Updated Medication List  
  
   
This list is accurate as of: 6/22/17 11:55 AM.  Always use your most recent med list.  
  
  
  
  
 aspirin delayed-release 81 mg tablet Take  by mouth daily. furosemide 40 mg tablet Commonly known as:  LASIX Take 1 Tab by mouth daily. lisinopril 20 mg tablet Commonly known as:  Cameron Leaver Take 1 Tab by mouth daily. potassium chloride SR 20 mEq tablet Commonly known as:  K-TAB Take 1 Tab by mouth daily. TYLENOL ARTHRITIS 650 mg CR tablet Generic drug:  acetaminophen Take 650 mg by mouth every six (6) hours as needed for Pain. Prescriptions Sent to Pharmacy Refills  
 lisinopril (PRINIVIL, ZESTRIL) 20 mg tablet 3 Sig: Take 1 Tab by mouth daily. Class: Normal  
 Pharmacy: [facility] [address] Ph #: [phone] Route: Oral  
 furosemide (LASIX) 40 mg tablet 3 Sig: Take 1 Tab by mouth daily. Class: Normal  
 Pharmacy: [facility] [address] Ph #: [phone] Route: Oral  
  
Follow-up Instructions Return in about 6 weeks (around 8/3/2017). To-Do List   
 06/22/2017 Imaging:  [facility] MAMMO BI SCREENING INCL CAD   
  
 07/22/2017 Lab:  CBC WITH AUTOMATED DIFF   
  
 07/22/2017 Lab:  HEMOGLOBIN A1C W/O EAG   
  
 07/22/2017 Lab:  HEPATITIS C AB   
  
 07/22/2017 Lab:  LIPID PANEL   
  
 07/22/2017 Lab:  MAGNESIUM   
  
 07/22/2017   Lab:  METABOLIC PANEL, COMPREHENSIVE   
  
 07/22/2017 Lab:  TSH 3RD GENERATION   
  
 07/22/2017 Lab:  VITAMIN B12   
  
 07/22/2017 Lab:  VITAMIN D, 25 HYDROXY Patient Instructions Please contact our office if you have any questions about your visit today. Introducing \[facility]\"" & HEALTH SERVICES! [employer] introduces [employer] patient portal. Now you can access parts of your medical record, email your doctor's office, and request medication refills online. 1. In your internet browser, go to [email or web address] 2. Click on the First Time User? Click Here link in the Sign In box. You will see the New Member Sign Up page. 3. Enter your Nearbox Access Code exactly as it appears below. You will not need to use this code after youve completed the sign-up process. If you do not sign up before the expiration date, you must request a new code. · Nearbox Access Code: [email or web address] Expires: 7/19/2017  4:05 PM 
 
4. Enter the last four digits of your Social Security Number (xxxx) and Date of Birth (mm/dd/yyyy) as indicated and click Submit. You will be taken to the next sign-up page. 5. Create a Nearbox ID. This will be your Nearbox login ID and cannot be changed, so think of one that is secure and easy to remember. 6. Create a Nearbox password. You can change your password at any time. 7. Enter your Password Reset Question and Answer. This can be used at a later time if you forget your password. 8. Enter your e-mail address. You will receive e-mail notification when new information is available in [address] 9. Click Sign Up. You can now view and download portions of your medical record. 10. Click the Download Summary menu link to download a portable copy of your medical information. If you have questions, please visit the Frequently Asked Questions section of the Nearbox website. Remember, [employer] is NOT to be used for urgent needs. For medical emergencies, dial 911. Now available from your iPhone and Android! Please provide this summary of care documentation to your next provider. Your primary care clinician is listed as RIN PINEDO. If you have any questions after today's visit, please call [phone].

## 2017-06-22 NOTE — PROGRESS NOTES
HISTORY OF PRESENT ILLNESS  Leela Gómez is a 74 y.o. female. Chief Complaint   Patient presents with    Other     schizoprenia    Hypertension    Swelling     edema, lower legs    Other     request order for mammogram   in with daughter who is very upset as pt had another 2 week admission to [facility] hospital. Had not been bathing, carrying round knives, unkempt, was taken by police involiuntarily. Pt denies anything is wrong with her. Has not been taking meds prescribed apparently except for blood pressure and edema meds  HPI  Past Medical History:   Diagnosis Date    Asthma     Hypertension     Latent syphilis 3/22/2017    Osteoarthritis of ankle     Right ankle pain 2008    posterior tibial tendon interstitial tear    Right foot pain 2011    hindfoot arthrosis    Sleep disorder      Current Outpatient Prescriptions   Medication Sig Dispense Refill    lisinopril (PRINIVIL, ZESTRIL) 20 mg tablet Take 1 Tab by mouth daily. 30 Tab 3    furosemide (LASIX) 40 mg tablet Take 1 Tab by mouth daily. 30 Tab 3    aspirin delayed-release 81 mg tablet Take  by mouth daily.  acetaminophen (TYLENOL ARTHRITIS) 650 mg CR tablet Take 650 mg by mouth every six (6) hours as needed for Pain.  potassium chloride SR (K-TAB) 20 mEq tablet Take 1 Tab by mouth daily. 30 Tab 3     No Known Allergies    ROS  Visit Vitals    /65 (BP 1 Location: Left arm, BP Patient Position: Sitting)    Pulse 79    Temp 97.7 °F (36.5 °C) (Oral)    Resp 16    Ht 4' 10.5\" (1.486 m)    Wt 217 lb (98.4 kg)    BMI 44.58 kg/m2       Physical Exam  Nursing note and vitals reviewed. Constitutional: She is oriented to person, place, and time. She appears well-developed and well-nourished. No distress. HENT:   Mouth/Throat: Oropharynx is clear and moist.   Neck: No JVD present. No thyromegaly present. Cardiovascular: Normal rate, regular rhythm and normal heart sounds.     Pulmonary/Chest: Effort normal and breath sounds normal. No respiratory distress. She has no wheezes. She has no rales. Musculoskeletal: She exhibits chronic edema. Lymphadenopathy:     She has no cervical adenopathy. Neurological: She is alert and oriented to person, place, and time. Coordination normal.   Psychiatric: She has a normal mood and affect. Her behavior is normal., appears pleasant but denying any psych issues        ASSESSMENT and PLAN    ICD-10-CM ICD-9-CM    1. Psychosis, unspecified psychosis type I15 964.1 METABOLIC PANEL, COMPREHENSIVE      CBC WITH AUTOMATED DIFF      VITAMIN B12      MAGNESIUM      TSH 3RD GENERATION   2. Essential hypertension I10 401.9 lisinopril (PRINIVIL, ZESTRIL) 20 mg tablet      METABOLIC PANEL, COMPREHENSIVE      CBC WITH AUTOMATED DIFF   3. Bilateral leg edema R60.0 782.3 furosemide (LASIX) 40 mg tablet      METABOLIC PANEL, COMPREHENSIVE      CBC WITH AUTOMATED DIFF      MAGNESIUM   4. Prediabetes D89.85 430.88 METABOLIC PANEL, COMPREHENSIVE      CBC WITH AUTOMATED DIFF   5. Hyperlipidemia, unspecified hyperlipidemia type E78.5 272.4 LIPID PANEL      METABOLIC PANEL, COMPREHENSIVE      CBC WITH AUTOMATED DIFF      TSH 3RD GENERATION   6. Vitamin D deficiency R04.9 862.9 METABOLIC PANEL, COMPREHENSIVE      CBC WITH AUTOMATED DIFF      VITAMIN D, 25 HYDROXY   7. Medication monitoring encounter Z51.81 V58.83 LIPID PANEL      METABOLIC PANEL, COMPREHENSIVE      CBC WITH AUTOMATED DIFF      VITAMIN B12      MAGNESIUM      TSH 3RD GENERATION      VITAMIN D, 25 HYDROXY      HEMOGLOBIN A1C W/O EAG   8. Need for hepatitis C screening test Z11.59 V73.89 HEPATITIS C AB   9. Encounter for screening mammogram for breast cancer Z12.31 V76.12 ERLIN MAMMO BI SCREENING INCL CAD   8.  Elevated fasting blood sugar P36.43 315.79 METABOLIC PANEL, COMPREHENSIVE      HEMOGLOBIN A1C W/O EAG   need records from [facility]  Visit based of time 40 minutes total,  with more than 50% of the face-to-face visit time spent in counseling with pt and daughter, psych admission, unclear dx and uncertainty with her meds,, it's treatment, prognosis, management advise, plan and follow-up recommendations.

## 2017-08-02 DIAGNOSIS — I10 ESSENTIAL HYPERTENSION: ICD-10-CM

## 2017-08-02 NOTE — TELEPHONE ENCOUNTER
Pt called stated she has misplaced her bp pills and needs a refill. Requested Prescriptions     Pending Prescriptions Disp Refills    lisinopril (PRINIVIL, ZESTRIL) 20 mg tablet 30 Tab 3     Sig: Take 1 Tab by mouth daily.

## 2017-08-10 RX ORDER — LISINOPRIL 20 MG/1
20 TABLET ORAL DAILY
Qty: 30 TAB | Refills: 3 | Status: SHIPPED | OUTPATIENT
Start: 2017-08-10 | End: 2017-10-11 | Stop reason: SDUPTHER

## 2017-10-11 DIAGNOSIS — R60.0 BILATERAL LEG EDEMA: ICD-10-CM

## 2017-10-11 DIAGNOSIS — I10 ESSENTIAL HYPERTENSION: ICD-10-CM

## 2017-10-11 NOTE — TELEPHONE ENCOUNTER
Pt called requesting refill for medication . Requested Prescriptions     Pending Prescriptions Disp Refills    lisinopril (PRINIVIL, ZESTRIL) 20 mg tablet 30 Tab 3     Sig: Take 1 Tab by mouth daily.  furosemide (LASIX) 40 mg tablet 30 Tab 3     Sig: Take 1 Tab by mouth daily.

## 2017-10-12 RX ORDER — FUROSEMIDE 40 MG/1
40 TABLET ORAL DAILY
Qty: 30 TAB | Refills: 3 | Status: SHIPPED | OUTPATIENT
Start: 2017-10-12 | End: 2018-01-17 | Stop reason: SDUPTHER

## 2017-10-12 RX ORDER — LISINOPRIL 20 MG/1
20 TABLET ORAL DAILY
Qty: 30 TAB | Refills: 3 | Status: SHIPPED | OUTPATIENT
Start: 2017-10-12 | End: 2018-01-17 | Stop reason: SDUPTHER

## 2017-11-27 ENCOUNTER — HOSPITAL ENCOUNTER (OUTPATIENT)
Dept: LAB | Age: 67
Discharge: HOME | End: 2017-11-27
Payer: MEDICARE

## 2017-11-27 ENCOUNTER — OFFICE VISIT (OUTPATIENT)
Dept: FAMILY MEDICINE CLINIC | Age: 67
End: 2017-11-27

## 2017-11-27 VITALS
OXYGEN SATURATION: 97 % | TEMPERATURE: 96 F | HEART RATE: 77 BPM | HEIGHT: 59 IN | WEIGHT: 223 LBS | SYSTOLIC BLOOD PRESSURE: 136 MMHG | BODY MASS INDEX: 44.96 KG/M2 | DIASTOLIC BLOOD PRESSURE: 76 MMHG

## 2017-11-27 DIAGNOSIS — Z23 ENCOUNTER FOR IMMUNIZATION: ICD-10-CM

## 2017-11-27 DIAGNOSIS — E78.5 HYPERLIPIDEMIA, UNSPECIFIED HYPERLIPIDEMIA TYPE: ICD-10-CM

## 2017-11-27 DIAGNOSIS — I10 ESSENTIAL HYPERTENSION: ICD-10-CM

## 2017-11-27 DIAGNOSIS — F29 PSYCHOSIS, UNSPECIFIED PSYCHOSIS TYPE (HCC): ICD-10-CM

## 2017-11-27 DIAGNOSIS — E66.01 OBESITY, MORBID (HCC): ICD-10-CM

## 2017-11-27 DIAGNOSIS — R73.01 ELEVATED FASTING BLOOD SUGAR: ICD-10-CM

## 2017-11-27 DIAGNOSIS — E55.9 VITAMIN D DEFICIENCY: ICD-10-CM

## 2017-11-27 DIAGNOSIS — I10 ESSENTIAL HYPERTENSION: Primary | ICD-10-CM

## 2017-11-27 DIAGNOSIS — Z11.59 NEED FOR HEPATITIS C SCREENING TEST: ICD-10-CM

## 2017-11-27 DIAGNOSIS — R73.9 HYPERGLYCEMIA: ICD-10-CM

## 2017-11-27 DIAGNOSIS — R60.0 EDEMA OF EXTREMITIES: ICD-10-CM

## 2017-11-27 DIAGNOSIS — R73.03 PREDIABETES: ICD-10-CM

## 2017-11-27 DIAGNOSIS — Z51.81 MEDICATION MONITORING ENCOUNTER: ICD-10-CM

## 2017-11-27 DIAGNOSIS — R60.0 BILATERAL LEG EDEMA: ICD-10-CM

## 2017-11-27 LAB
ALBUMIN SERPL-MCNC: 3.7 G/DL (ref 3.4–5)
ALBUMIN/GLOB SERPL: 0.9 {RATIO} (ref 0.8–1.7)
ALP SERPL-CCNC: 100 U/L (ref 45–117)
ALT SERPL-CCNC: 25 U/L (ref 13–56)
ANION GAP SERPL CALC-SCNC: 7 MMOL/L (ref 3–18)
AST SERPL-CCNC: 17 U/L (ref 15–37)
BASOPHILS # BLD: 0 K/UL (ref 0–0.06)
BASOPHILS NFR BLD: 0 % (ref 0–2)
BILIRUB SERPL-MCNC: 0.3 MG/DL (ref 0.2–1)
BUN SERPL-MCNC: 20 MG/DL (ref 7–18)
BUN/CREAT SERPL: 17 (ref 12–20)
CALCIUM SERPL-MCNC: 8.8 MG/DL (ref 8.5–10.1)
CHLORIDE SERPL-SCNC: 108 MMOL/L (ref 100–108)
CO2 SERPL-SCNC: 27 MMOL/L (ref 21–32)
CREAT SERPL-MCNC: 1.18 MG/DL (ref 0.6–1.3)
DIFFERENTIAL METHOD BLD: ABNORMAL
EOSINOPHIL # BLD: 0.2 K/UL (ref 0–0.4)
EOSINOPHIL NFR BLD: 4 % (ref 0–5)
ERYTHROCYTE [DISTWIDTH] IN BLOOD BY AUTOMATED COUNT: 16.7 % (ref 11.6–14.5)
GLOBULIN SER CALC-MCNC: 4.2 G/DL (ref 2–4)
GLUCOSE SERPL-MCNC: 90 MG/DL (ref 74–99)
HBA1C MFR BLD: 6.7 % (ref 4.2–5.6)
HCT VFR BLD AUTO: 39.4 % (ref 35–45)
HGB BLD-MCNC: 11.9 G/DL (ref 12–16)
LYMPHOCYTES # BLD: 1.7 K/UL (ref 0.9–3.6)
LYMPHOCYTES NFR BLD: 36 % (ref 21–52)
MAGNESIUM SERPL-MCNC: 2.4 MG/DL (ref 1.6–2.6)
MCH RBC QN AUTO: 22.3 PG (ref 24–34)
MCHC RBC AUTO-ENTMCNC: 30.2 G/DL (ref 31–37)
MCV RBC AUTO: 73.9 FL (ref 74–97)
MONOCYTES # BLD: 0.4 K/UL (ref 0.05–1.2)
MONOCYTES NFR BLD: 9 % (ref 3–10)
NEUTS SEG # BLD: 2.4 K/UL (ref 1.8–8)
NEUTS SEG NFR BLD: 51 % (ref 40–73)
PLATELET # BLD AUTO: 205 K/UL (ref 135–420)
PMV BLD AUTO: 10.9 FL (ref 9.2–11.8)
POTASSIUM SERPL-SCNC: 4.4 MMOL/L (ref 3.5–5.5)
PROT SERPL-MCNC: 7.9 G/DL (ref 6.4–8.2)
RBC # BLD AUTO: 5.33 M/UL (ref 4.2–5.3)
SODIUM SERPL-SCNC: 142 MMOL/L (ref 136–145)
TSH SERPL DL<=0.05 MIU/L-ACNC: 0.97 UIU/ML (ref 0.36–3.74)
VIT B12 SERPL-MCNC: 462 PG/ML (ref 211–911)
WBC # BLD AUTO: 4.7 K/UL (ref 4.6–13.2)

## 2017-11-27 PROCEDURE — 80053 COMPREHEN METABOLIC PANEL: CPT | Performed by: FAMILY MEDICINE

## 2017-11-27 PROCEDURE — 85025 COMPLETE CBC W/AUTO DIFF WBC: CPT | Performed by: FAMILY MEDICINE

## 2017-11-27 PROCEDURE — 84443 ASSAY THYROID STIM HORMONE: CPT | Performed by: FAMILY MEDICINE

## 2017-11-27 PROCEDURE — 36415 COLL VENOUS BLD VENIPUNCTURE: CPT | Performed by: FAMILY MEDICINE

## 2017-11-27 PROCEDURE — 82607 VITAMIN B-12: CPT | Performed by: FAMILY MEDICINE

## 2017-11-27 PROCEDURE — 83036 HEMOGLOBIN GLYCOSYLATED A1C: CPT | Performed by: FAMILY MEDICINE

## 2017-11-27 PROCEDURE — 83735 ASSAY OF MAGNESIUM: CPT | Performed by: FAMILY MEDICINE

## 2017-11-27 PROCEDURE — 82306 VITAMIN D 25 HYDROXY: CPT | Performed by: FAMILY MEDICINE

## 2017-11-27 PROCEDURE — 86803 HEPATITIS C AB TEST: CPT | Performed by: FAMILY MEDICINE

## 2017-11-27 NOTE — PROGRESS NOTES
Chief Complaint   Patient presents with    Hypertension    Diabetes     prediabetes    Cholesterol Problem    Vitamin D Deficiency     1. Have you been to the ER, urgent care clinic since your last visit? Hospitalized since your last visit? No    2. Have you seen or consulted any other health care providers outside of the [address] [place] [address] since your last visit? Include any pap smears or colon screening.  No

## 2017-11-27 NOTE — MR AVS SNAPSHOT
Visit Information Date & Time Provider Department Dept. Phone Encounter #  
 11/27/2017 10:30 AM Jordin Gunderson MD [facility] [phone] [id number] Follow-up Instructions Return in about 6 weeks (around 1/8/2018) for pap. Upcoming Health Maintenance Date Due Hepatitis C Screening 1950 DTaP/Tdap/Td series (1 - Tdap) 1/17/1973 BREAST CANCER SCRN MAMMOGRAM 4/25/2016 GLAUCOMA SCREENING Q2Y 1/17/2017 OSTEOPOROSIS SCREENING (DEXA) 1/17/2017 Influenza Age 5 to Adult 8/1/2017 MEDICARE YEARLY EXAM 11/22/2017 Pneumococcal 65+ Low/Medium Risk (2 of 2 - PPSV23) 2/22/2018 COLONOSCOPY 8/25/2025 Allergies as of 11/27/2017  Review Complete On: 11/27/2017 By: Marie Larry LPN No Known Allergies Current Immunizations  Reviewed on 11/21/2016 Name Date Influenza Vaccine (Quad) PF 9/28/2016 Influenza Vaccine PF 8/26/2014 Pneumococcal Conjugate (PCV-13) 2/22/2017 Zoster Vaccine, Live 12/3/2014 Not reviewed this visit You Were Diagnosed With   
  
 Codes Comments Essential hypertension    -  Primary ICD-10-CM: I10 
ICD-9-CM: 401.9 Hyperlipidemia, unspecified hyperlipidemia type     ICD-10-CM: E78.5 ICD-9-CM: 272.4 Hyperglycemia     ICD-10-CM: R73.9 ICD-9-CM: 790.29 Obesity, morbid ([facility]     ICD-10-CM: E66.01 
ICD-9-CM: 278.01   
 Psychosis, unspecified psychosis type     ICD-10-CM: F29 
ICD-9-CM: 298.9 Edema of extremities     ICD-10-CM: R60.0 ICD-9-CM: 863. 3 Vitals BP Pulse Temp Height(growth percentile) Weight(growth percentile) SpO2  
 136/76 77 96 °F (35.6 °C) (Oral) 4' 10.5\" (1.486 m) 223 lb (101.2 kg) 97% BMI OB Status Smoking Status 45.81 kg/m2 Postmenopausal Never Smoker BMI and BSA Data Body Mass Index Body Surface Area 45.81 kg/m 2 2.04 m 2 Preferred Pharmacy Pharmacy Name Phone [facility] [address], [address] [facility] Your Updated Medication List  
  
   
This list is accurate as of: 11/27/17 11:54 AM.  Always use your most recent med list.  
  
  
  
  
 aspirin delayed-release 81 mg tablet Take  by mouth daily. furosemide 40 mg tablet Commonly known as:  LASIX Take 1 Tab by mouth daily. lisinopril 20 mg tablet Commonly known as:  Aundra Ronit Take 1 Tab by mouth daily. potassium chloride SR 20 mEq tablet Commonly known as:  K-TAB Take 1 Tab by mouth daily. TYLENOL ARTHRITIS 650 mg Marzena  Generic drug:  acetaminophen Take 650 mg by mouth every six (6) hours as needed for Pain. Follow-up Instructions Return in about 6 weeks (around 1/8/2018) for pap. Patient Instructions Please contact our office if you have any questions about your visit today. Introducing [facility]! [employer] introduces [employer] patient portal. Now you can access parts of your medical record, email your doctor's office, and request medication refills online. 1. In your internet browser, go to [email or web address] 2. Click on the First Time User? Click Here link in the Sign In box. You will see the New Member Sign Up page. 3. Enter your [employer] Access Code exactly as it appears below. You will not need to use this code after youve completed the sign-up process. If you do not sign up before the expiration date, you must request a new code. · [employer] Access Code: [email or web address] Expires: 2/25/2018 11:54 AM 
 
4. Enter the last four digits of your Social Security Number (xxxx) and Date of Birth (mm/dd/yyyy) as indicated and click Submit. You will be taken to the next sign-up page. 5. Create a [employer] ID. This will be your [employer] login ID and cannot be changed, so think of one that is secure and easy to remember. 6. Create a [employer] password. You can change your password at any time. 7. Enter your Password Reset Question and Answer. This can be used at a later time if you forget your password. 8. Enter your e-mail address. You will receive e-mail notification when new information is available in [address] 9. Click Sign Up. You can now view and download portions of your medical record. 10. Click the Download Summary menu link to download a portable copy of your medical information. If you have questions, please visit the Frequently Asked Questions section of the [employer] website. Remember, [employer] is NOT to be used for urgent needs. For medical emergencies, dial 911. Now available from your iPhone and Android! Please provide this summary of care documentation to your next provider. Your primary care clinician is listed as RIN PINEDO. If you have any questions after today's visit, please call [phone].

## 2017-11-27 NOTE — PROGRESS NOTES
HISTORY OF PRESENT ILLNESS  Leela Gómez is a 74 y.o. female. Chief Complaint   Patient presents with    Hypertension chronic problem, stable Reports compliance with meds Asymptomatic, no headache or dizziness.  Diabetes     Prediabetes Chronic problem, control uncertain, labs past due.  Cholesterol Problem Chronic problem, control uncertain, labs past due.  Vitamin D Deficiency   Edema chronic problem, stable   Psychosis, Chronic problem, uncontrolled no records pt had another hospitalization few months ago cannot provide any other information  HPI  Past Medical History:   Diagnosis Date    Asthma     Hypertension     Latent syphilis 3/22/2017    Osteoarthritis of ankle     Right ankle pain 2008    posterior tibial tendon interstitial tear    Right foot pain 2011    hindfoot arthrosis    Sleep disorder      Current Outpatient Prescriptions   Medication Sig Dispense Refill    lisinopril (PRINIVIL, ZESTRIL) 20 mg tablet Take 1 Tab by mouth daily. 30 Tab 3    furosemide (LASIX) 40 mg tablet Take 1 Tab by mouth daily. 30 Tab 3    aspirin delayed-release 81 mg tablet Take  by mouth daily.  acetaminophen (TYLENOL ARTHRITIS) 650 mg CR tablet Take 650 mg by mouth every six (6) hours as needed for Pain.  potassium chloride SR (K-TAB) 20 mEq tablet Take 1 Tab by mouth daily. 30 Tab 3     No Known Allergies    ROS Respiratory: Negative for shortness of breath. Cardiovascular: Negative for chest pain. Genitourinary: Negative for frequency. Neurological: Negative for dizziness and headaches. Visit Vitals    /76    Pulse 77    Temp 96 °F (35.6 °C) (Oral)    Ht 4' 10.5\" (1.486 m)    Wt 223 lb (101.2 kg)    SpO2 97%    BMI 45.81 kg/m2       Physical Exam   Constitutional: She is oriented to person, place, and time. She appears well-developed and well-nourished. No distress.    HENT:   Mouth/Throat: Oropharynx is clear and moist.   Cardiovascular: Normal rate, regular rhythm and normal heart sounds. Pulmonary/Chest: Effort normal and breath sounds normal. No respiratory distress. She has no wheezes. She has no rales. Musculoskeletal: She exhibits edema (1+ chronic). She exhibits no tenderness. Lymphadenopathy:     She has no cervical adenopathy. Neurological: She is alert and oriented to person, place, and time. Coordination normal.   Psychiatric: She has a normal mood and affect. Nursing note and vitals reviewed. ASSESSMENT and PLAN    ICD-10-CM ICD-9-CM    1. Essential hypertension stable continue current medications    I10 401.9    2. Hyperlipidemia, unspecified hyperlipidemia type Labs drawn in office today   Chronic problem, control uncertain, labs past due. E78.5 272.4    3. Hyperglycemia Labs drawn in office today    R73.9 790.29    4. Obesity, morbid ([facility] counseled handout given E66.01 278.01    5. Psychosis, unspecified psychosis type need records F29 298.9    6. Edema of extremities Stable, continue current care. R60.0 782.3    Follow-up Disposition:  Return in about 6 weeks (around 1/8/2018) for pap.

## 2017-11-27 NOTE — PROGRESS NOTES
Paulo Nguyen is a 74 y.o. female who presents for routine immunizations. She denies any symptoms , reactions or allergies that would exclude them from being immunized today. Risks and adverse reactions were discussed and the VIS was given to them. All questions were addressed. She was observed for 15 min post injection. There were no reactions observed.     Kathrine Lu LPN

## 2017-11-28 LAB
25(OH)D3 SERPL-MCNC: 18.8 NG/ML (ref 30–100)
HCV AB SER IA-ACNC: 0.06 INDEX
HCV AB SERPL QL IA: NEGATIVE
HCV COMMENT,HCGAC: NORMAL

## 2018-01-17 ENCOUNTER — OFFICE VISIT (OUTPATIENT)
Dept: FAMILY MEDICINE CLINIC | Age: 68
End: 2018-01-17

## 2018-01-17 VITALS
RESPIRATION RATE: 20 BRPM | HEART RATE: 75 BPM | BODY MASS INDEX: 45.16 KG/M2 | TEMPERATURE: 96.5 F | SYSTOLIC BLOOD PRESSURE: 122 MMHG | DIASTOLIC BLOOD PRESSURE: 72 MMHG | HEIGHT: 59 IN | WEIGHT: 224 LBS

## 2018-01-17 DIAGNOSIS — Z09 HOSPITAL DISCHARGE FOLLOW-UP: ICD-10-CM

## 2018-01-17 DIAGNOSIS — E87.6 DIURETIC-INDUCED HYPOKALEMIA: ICD-10-CM

## 2018-01-17 DIAGNOSIS — R60.0 BILATERAL LEG EDEMA: ICD-10-CM

## 2018-01-17 DIAGNOSIS — T50.2X5A DIURETIC-INDUCED HYPOKALEMIA: ICD-10-CM

## 2018-01-17 DIAGNOSIS — F20.0 PARANOID SCHIZOPHRENIA (HCC): Primary | ICD-10-CM

## 2018-01-17 DIAGNOSIS — I10 ESSENTIAL HYPERTENSION: ICD-10-CM

## 2018-01-17 RX ORDER — POTASSIUM CHLORIDE 1500 MG/1
20 TABLET, FILM COATED, EXTENDED RELEASE ORAL DAILY
Qty: 30 TAB | Refills: 3 | Status: SHIPPED | OUTPATIENT
Start: 2018-01-17

## 2018-01-17 RX ORDER — LISINOPRIL 20 MG/1
20 TABLET ORAL DAILY
Qty: 30 TAB | Refills: 3 | Status: SHIPPED | OUTPATIENT
Start: 2018-01-17 | End: 2018-03-26 | Stop reason: SDUPTHER

## 2018-01-17 RX ORDER — FUROSEMIDE 40 MG/1
40 TABLET ORAL DAILY
Qty: 30 TAB | Refills: 3 | Status: SHIPPED | OUTPATIENT
Start: 2018-01-17 | End: 2018-04-17 | Stop reason: SDUPTHER

## 2018-01-17 NOTE — PROGRESS NOTES
Chief Complaint   Patient presents with   [facility] Follow Up     patient was recently released from [facility] for homicidal thoughts       Health Maintenance Due   Topic Date Due    DTaP/Tdap/Td series (1 - Tdap) 01/17/1973    BREAST CANCER SCRN MAMMOGRAM  04/25/2016    GLAUCOMA SCREENING Q2Y  01/17/2017    OSTEOPOROSIS SCREENING (DEXA)  01/17/2017    MEDICARE YEARLY EXAM  11/22/2017       Health Maintenance reviewed     1. Have you been to the ER, urgent care clinic since your last visit? Hospitalized since your last visit? Yes When: 1/18 Where: mental health Reason for visit: homocidal thoughts    2. Have you seen or consulted any other health care providers outside of the [address] [place] [address] since your last visit? Include any pap smears or colon screening.  No

## 2018-01-17 NOTE — PROGRESS NOTES
HPI  Melanie Ashton is a 79 y.o. female. Attending appointment by herself. Reports cousin drove her. Chief Complaint   Patient presents with   [facility] Follow Up     patient was recently released from [facility] for homicidal thoughts   Denies depression, hallucination, suicide ideations, homicide ideations, insomnia. Denies being schizophrenic. Reports this makes her angry when people ask if she has schizophrena because she does not. Reports her brother has it. Denies any mental health history. Denies being on any medication. Denies seeing a counselor. Denies seeing a psychiatrist.  Denies desire to hurt or harm self or others. Reports her mother called the police on her and they took her to the [address] [place].   Requesting medication refill for her blood pressure and indicating this is the purpose of today's appointment. Denies chest pain and or shortness of breath. Does admit that her daughter has some medications that she gives her in the evening. Reports her daughter holds on to these medications. Past Medical History  Past Medical History:   Diagnosis Date    Asthma     Hypertension     Latent syphilis 3/22/2017    Osteoarthritis of ankle     Right ankle pain 2008    posterior tibial tendon interstitial tear    Right foot pain     hindfoot arthrosis    Sleep disorder        Surgical History  Past Surgical History:   Procedure Laterality Date    HX  SECTION          Medications  Current Outpatient Prescriptions   Medication Sig Dispense Refill    lisinopril (PRINIVIL, ZESTRIL) 20 mg tablet Take 1 Tab by mouth daily. 30 Tab 3    furosemide (LASIX) 40 mg tablet Take 1 Tab by mouth daily. 30 Tab 3    potassium chloride SR (K-TAB) 20 mEq tablet Take 1 Tab by mouth daily. 30 Tab 3    aspirin delayed-release 81 mg tablet Take  by mouth daily.       acetaminophen (TYLENOL ARTHRITIS) 650 mg CR tablet Take 650 mg by mouth every six (6) hours as needed for Pain.         Allergies  No Known Allergies    Family History  Family History   Problem Relation Age of Onset    Diabetes Mother     Hypertension Mother     Heart Disease Father     Hypertension Father     Gout Father     Heart Attack Brother     Schizophrenia Brother        Social History  Social History     Social History    Marital status:      Spouse name: N/A    Number of children: N/A    Years of education: N/A     Occupational History    Not on file. Social History Main Topics    Smoking status: Never Smoker    Smokeless tobacco: Never Used    Alcohol use No    Drug use: No    Sexual activity: No     Other Topics Concern    Not on file     Social History Narrative       Problem List  Patient Active Problem List   Diagnosis Code    HTN (hypertension) I10    Hyperlipidemia E78.5    Family history of heart attack, premature, brother 46s Z80.55    Prediabetes R78.1    Family history of diabetes mellitus type II, mother Z80.1    Vitamin D deficiency E55.9    ACP (advance care planning) Z71.89    Latent syphilis A53.0    Obesity, morbid ([facility] E66.01       Review of Systems  Review of Systems   Respiratory: Negative for shortness of breath. Cardiovascular: Negative for chest pain and palpitations. Neurological: Negative for dizziness and headaches. Psychiatric/Behavioral: Negative for depression, hallucinations, substance abuse and suicidal ideas. The patient is not nervous/anxious and does not have insomnia. Vital Signs  Vitals:    01/17/18 1016   BP: 122/72   Pulse: 75   Resp: 20   Temp: 96.5 °F (35.8 °C)   TempSrc: Oral   Weight: 224 lb (101.6 kg)   Height: 4' 10.5\" (1.486 m)   PainSc:   0 - No pain       Physical Exam  Physical Exam   Constitutional: She is oriented to person, place, and time. She is cooperative. Strong body odor. Disheveled.    HENT:   Mouth/Throat: Oropharynx is clear and moist.   Cardiovascular: Normal rate, regular rhythm and normal heart sounds. Pulmonary/Chest: Effort normal and breath sounds normal. No respiratory distress. She has no wheezes. Neurological: She is alert and oriented to person, place, and time. Psychiatric: She has a normal mood and affect. Her speech is normal. Thought content normal. She is agitated. Agitated behavior noted when asking patient about her mental health diagnoses. Vitals reviewed. Diagnostics  Orders Placed This Encounter    lisinopril (PRINIVIL, ZESTRIL) 20 mg tablet     Sig: Take 1 Tab by mouth daily. Dispense:  30 Tab     Refill:  3    furosemide (LASIX) 40 mg tablet     Sig: Take 1 Tab by mouth daily. Dispense:  30 Tab     Refill:  3    potassium chloride SR (K-TAB) 20 mEq tablet     Sig: Take 1 Tab by mouth daily. Dispense:  30 Tab     Refill:  3       Results  Results for orders placed or performed during the hospital encounter of 18/76/75   METABOLIC PANEL, COMPREHENSIVE   Result Value Ref Range    Sodium 142 136 - 145 mmol/L    Potassium 4.4 3.5 - 5.5 mmol/L    Chloride 108 100 - 108 mmol/L    CO2 27 21 - 32 mmol/L    Anion gap 7 3.0 - 18 mmol/L    Glucose 90 74 - 99 mg/dL    BUN 20 (H) 7.0 - 18 MG/DL    Creatinine 1.18 0.6 - 1.3 MG/DL    BUN/Creatinine ratio 17 12 - 20      GFR est AA 56 (L) >60 ml/min/1.73m2    GFR est non-AA 46 (L) >60 ml/min/1.73m2    Calcium 8.8 8.5 - 10.1 MG/DL    Bilirubin, total 0.3 0.2 - 1.0 MG/DL    ALT (SGPT) 25 13 - 56 U/L    AST (SGOT) 17 15 - 37 U/L    Alk.  phosphatase 100 45 - 117 U/L    Protein, total 7.9 6.4 - 8.2 g/dL    Albumin 3.7 3.4 - 5.0 g/dL    Globulin 4.2 (H) 2.0 - 4.0 g/dL    A-G Ratio 0.9 0.8 - 1.7     CBC WITH AUTOMATED DIFF   Result Value Ref Range    WBC 4.7 4.6 - 13.2 K/uL    RBC 5.33 (H) 4.20 - 5.30 M/uL    HGB 11.9 (L) 12.0 - 16.0 g/dL    HCT 39.4 35.0 - 45.0 %    MCV 73.9 (L) 74.0 - 97.0 FL    MCH 22.3 (L) 24.0 - 34.0 PG    MCHC 30.2 (L) 31.0 - 37.0 g/dL    RDW 16.7 (H) 11.6 - 14.5 %    PLATELET 979 352 - 018 K/uL    MPV 10.9 9.2 - 11.8 FL    NEUTROPHILS 51 40 - 73 %    LYMPHOCYTES 36 21 - 52 %    MONOCYTES 9 3 - 10 %    EOSINOPHILS 4 0 - 5 %    BASOPHILS 0 0 - 2 %    ABS. NEUTROPHILS 2.4 1.8 - 8.0 K/UL    ABS. LYMPHOCYTES 1.7 0.9 - 3.6 K/UL    ABS. MONOCYTES 0.4 0.05 - 1.2 K/UL    ABS. EOSINOPHILS 0.2 0.0 - 0.4 K/UL    ABS. BASOPHILS 0.0 0.0 - 0.06 K/UL    DF AUTOMATED     VITAMIN B12   Result Value Ref Range    Vitamin B12 462 211 - 911 pg/mL   MAGNESIUM   Result Value Ref Range    Magnesium 2.4 1.6 - 2.6 mg/dL   TSH 3RD GENERATION   Result Value Ref Range    TSH 0.97 0.36 - 3.74 uIU/mL   VITAMIN D, 25 HYDROXY   Result Value Ref Range    Vitamin D 25-Hydroxy 18.8 (L) 30 - 100 ng/mL   HEMOGLOBIN A1C W/O EAG   Result Value Ref Range    Hemoglobin A1c 6.7 (H) 4.2 - 5.6 %   HEPATITIS C AB   Result Value Ref Range    Hepatitis C virus Ab 0.06 <0.80 Index    Hep C  virus Ab Interp. NEGATIVE  NEG      Hep C  virus Ab comment               Assessment and Plan  Diagnoses and all orders for this visit:    1. Paranoid schizophrenia ([address]    2. Essential hypertension  -     lisinopril (PRINIVIL, ZESTRIL) 20 mg tablet; Take 1 Tab by mouth daily. -     potassium chloride SR (K-TAB) 20 mEq tablet; Take 1 Tab by mouth daily. 3. Bilateral leg edema  -     furosemide (LASIX) 40 mg tablet; Take 1 Tab by mouth daily. 4. Diuretic-induced hypokalemia  -     potassium chloride SR (K-TAB) 20 mEq tablet; Take 1 Tab by mouth daily. [facility] discharge follow-up      Discussed appointment with patient to attend Aleah Carvajal. Discussed personal hygiene with patient and she was receptive. After care summary printed and reviewed with patient. Plan reviewed with patient. Questions answered. Patient verbalized understanding of plan and is in agreement with plan. Patient to follow up in three months with her PCP or earlier if symptoms worsen.      KWAME BarbaC

## 2018-01-17 NOTE — MR AVS SNAPSHOT
Libby Artie 
 
 
 [address] [facility] [address] 
[phone] Patient: Nate Root MRN: [id number] HBI:[id number] Visit Information Date & Time Provider Department Dept. Phone Encounter #  
 1/17/2018  9:45 AM Elisabet Guadalupe NP [facility] Desou[facility] 77 [id number] Follow-up Instructions Return in about 3 months (around 4/17/2018), or if symptoms worsen or fail to improve. Upcoming Health Maintenance Date Due DTaP/Tdap/Td series (1 - Tdap) 1/17/1973 BREAST CANCER SCRN MAMMOGRAM 4/25/2016 GLAUCOMA SCREENING Q2Y 1/17/2017 OSTEOPOROSIS SCREENING (DEXA) 1/17/2017 MEDICARE YEARLY EXAM 11/22/2017 Pneumococcal 65+ Low/Medium Risk (2 of 2 - PPSV23) 2/22/2018 COLONOSCOPY 8/25/2025 Allergies as of 1/17/2018  Review Complete On: 11/27/2017 By: Gabriel Palacios MD  
 No Known Allergies Current Immunizations  Reviewed on 11/21/2016 Name Date Influenza High Dose Vaccine PF 11/27/2017 Influenza Vaccine (Quad) PF 9/28/2016 Influenza Vaccine PF 8/26/2014 Pneumococcal Conjugate (PCV-13) 2/22/2017 Zoster Vaccine, Live 12/3/2014 Not reviewed this visit You Were Diagnosed With   
  
 Codes Comments Paranoid schizophrenia ([facility].)    -  Primary ICD-10-CM: F20.0 ICD-9-CM: 295.30 Essential hypertension     ICD-10-CM: I10 
ICD-9-CM: 401.9 Bilateral leg edema     ICD-10-CM: R60.0 ICD-9-CM: 217. 3 Diuretic-induced hypokalemia     ICD-10-CM: E87.6, T50.2X5A 
ICD-9-CM: 276.8, E944.4 Hospital discharge follow-up     ICD-10-CM: [facility] ICD-9-CM: V67.59 Vitals BP Pulse Temp Resp Height(growth percentile) Weight(growth percentile) 122/72 (BP 1 Location: Right arm, BP Patient Position: Sitting) 75 96.5 °F (35.8 °C) (Oral) 20 4' 10.5\" (1.486 m) 224 lb (101.6 kg) BMI OB Status Smoking Status 46.02 kg/m2 Postmenopausal Never Smoker BMI and BSA Data Body Mass Index Body Surface Area 46.02 kg/m 2 2.05 m 2 Preferred Pharmacy Pharmacy Name Phone [address], [address] Your Updated Medication List  
  
   
This list is accurate as of: 1/17/18 11:20 AM.  Always use your most recent med list.  
  
  
  
  
 aspirin delayed-release 81 mg tablet Take  by mouth daily. furosemide 40 mg tablet Commonly known as:  LASIX Take 1 Tab by mouth daily. lisinopril 20 mg tablet Commonly known as:  Dellis Rumble Take 1 Tab by mouth daily. potassium chloride SR 20 mEq tablet Commonly known as:  K-TAB Take 1 Tab by mouth daily. TYLENOL ARTHRITIS 650 mg Claire Warner Generic drug:  acetaminophen Take 650 mg by mouth every six (6) hours as needed for Pain. Prescriptions Sent to Pharmacy Refills  
 lisinopril (PRINIVIL, ZESTRIL) 20 mg tablet 3 Sig: Take 1 Tab by mouth daily. Class: Normal  
 Pharmacy: [facility] [address] [address] Ph #: [phone] Route: Oral  
 furosemide (LASIX) 40 mg tablet 3 Sig: Take 1 Tab by mouth daily. Class: Normal  
 Pharmacy: [facility] [address] [address] Ph #: [phone] Route: Oral  
 potassium chloride SR (K-TAB) 20 mEq tablet 3 Sig: Take 1 Tab by mouth daily. Class: Normal  
 Pharmacy: [facility] [address] [address] Ph #: [phone] Route: Oral  
  
Follow-up Instructions Return in about 3 months (around 4/17/2018), or if symptoms worsen or fail to improve. Patient Instructions Please contact our office if you have any questions about your visit today. High Blood Pressure: Care Instructions Your Care Instructions If your blood pressure is usually above 140/90, you have high blood pressure, or hypertension. That means the top number is 140 or higher or the bottom number is 90 or higher, or both. Despite what a lot of people think, high blood pressure usually doesn't cause headaches or make you feel dizzy or lightheaded. It usually has no symptoms. But it does increase your risk for heart attack, stroke, and kidney or eye damage. The higher your blood pressure, the more your risk increases. Your doctor will give you a goal for your blood pressure. Your goal will be based on your health and your age. An example of a goal is to keep your blood pressure below 140/90. Lifestyle changes, such as eating healthy and being active, are always important to help lower blood pressure. You might also take medicine to reach your blood pressure goal. 
Follow-up care is a key part of your treatment and safety. Be sure to make and go to all appointments, and call your doctor if you are having problems. It's also a good idea to know your test results and keep a list of the medicines you take. How can you care for yourself at home? Medical treatment · If you stop taking your medicine, your blood pressure will go back up. You may take one or more types of medicine to lower your blood pressure. Be safe with medicines. Take your medicine exactly as prescribed. Call your doctor if you think you are having a problem with your medicine. · Talk to your doctor before you start taking aspirin every day. Aspirin can help certain people lower their risk of a heart attack or stroke. But taking aspirin isn't right for everyone, because it can cause serious bleeding. · See your doctor regularly. You may need to see the doctor more often at first or until your blood pressure comes down. · If you are taking blood pressure medicine, talk to your doctor before you take decongestants or anti-inflammatory medicine, such as ibuprofen. Some of these medicines can raise blood pressure. · Learn how to check your blood pressure at home. Lifestyle changes · Stay at a healthy weight.  This is especially important if you put on weight around the waist. Losing even 10 pounds can help you lower your blood pressure. · If your doctor recommends it, get more exercise. Walking is a good choice. Bit by bit, increase the amount you walk every day. Try for at least 30 minutes on most days of the week. You also may want to swim, bike, or do other activities. · Avoid or limit alcohol. Talk to your doctor about whether you can drink any alcohol. · Try to limit how much sodium you eat to less than 2,300 milligrams (mg) a day. Your doctor may ask you to try to eat less than 1,500 mg a day. · Eat plenty of fruits (such as bananas and oranges), vegetables, legumes, whole grains, and low-fat dairy products. · Lower the amount of saturated fat in your diet. Saturated fat is found in animal products such as milk, cheese, and meat. Limiting these foods may help you lose weight and also lower your risk for heart disease. · Do not smoke. Smoking increases your risk for heart attack and stroke. If you need help quitting, talk to your doctor about stop-smoking programs and medicines. These can increase your chances of quitting for good. When should you call for help? Call 911 anytime you think you may need emergency care. This may mean having symptoms that suggest that your blood pressure is causing a serious heart or blood vessel problem. Your blood pressure may be over 180/110. ? For example, call 911 if: 
? · You have symptoms of a heart attack. These may include: ¨ Chest pain or pressure, or a strange feeling in the chest. 
¨ Sweating. ¨ Shortness of breath. ¨ Nausea or vomiting. ¨ Pain, pressure, or a strange feeling in the back, neck, jaw, or upper belly or in one or both shoulders or arms. ¨ Lightheadedness or sudden weakness. ¨ A fast or irregular heartbeat. ? · You have symptoms of a stroke. These may include: 
¨ Sudden numbness, tingling, weakness, or loss of movement in your face, arm, or leg, especially on only one side of your body. ¨ Sudden vision changes. ¨ Sudden trouble speaking. ¨ Sudden confusion or trouble understanding simple statements. ¨ Sudden problems with walking or balance. ¨ A sudden, severe headache that is different from past headaches. ? · You have severe back or belly pain. ?Do not wait until your blood pressure comes down on its own. Get help right away. ?Call your doctor now or seek immediate care if: 
? · Your blood pressure is much higher than normal (such as 180/110 or higher), but you don't have symptoms. ? · You think high blood pressure is causing symptoms, such as: ¨ Severe headache. ¨ Blurry vision. ? Watch closely for changes in your health, and be sure to contact your doctor if: 
? · Your blood pressure measures 140/90 or higher at least 2 times. That means the top number is 140 or higher or the bottom number is 90 or higher, or both. ? · You think you may be having side effects from your blood pressure medicine. ? · Your blood pressure is usually normal, but it goes above normal at least 2 times. Where can you learn more? Go to http://varsha-krystle.info/. Enter A769 in the search box to learn more about \"High Blood Pressure: Care Instructions. \" Current as of: September 21, 2016 Content Version: 11.4 © 4965-9994 [employer]. Care instructions adapted under license by [employer] (which disclaims liability or warranty for this information). If you have questions about a medical condition or this instruction, always ask your healthcare professional. [place] [address] any warranty or liability for your use of this information. Learning About Managing Anger What causes anger? Many things can cause anger: Stress at work or at home. Social situations that make you angry. A response to everyday events. Anger signals your body to prepare for a fight.  This reaction is often called \"fight or flight. \" When you get angry, adrenaline and other hormones are released into your blood. Then your blood pressure goes up, your heart beats faster, and you breathe faster. When you express anger in a healthy way, it can inspire change and make you productive. But if you don't have the skills to express anger in a healthy way, anger can build up. You may hurt others-and yourself-emotionally and even physically. Violent behavior often starts with verbal threats or fairly minor incidents. But over time, it can involve physical harm. It can include physical, verbal, or sexual abuse of an intimate partner (domestic violence), a child (child abuse), or an older adult (elder abuse). It can also make you sick. Anger and constant hostility keep your blood pressure high. They increase your chances of having another health problem, such as depression, a heart attack, or a stroke. Some people with post-traumatic stress disorder (PTSD) feel angry and on alert all the time. It may feel like there are no other ways to react when you are angry. But when you learn to work with anger in appropriate and healthy ways, your anger no longer controls you. How can you manage your anger? The first step to managing anger is to be more aware of it. Note the thoughts, feelings, and emotions that you have when you get angry. Practice noticing these signs of anger when you are calm. If you are more aware of the signs of anger, you can take steps to manage it. Here are a few tips: 
· Think before you act. Take time to stop and cool down when you feel yourself getting angry. Count to 10 while you take slow, steady breaths. Practice some other form of mental relaxation. · Learn the feelings that lead to angry outbursts. Anger and hostility may be a symptom of unhappy feelings or depression about your job, your relationship, or other aspects of your personal life. · Avoid situations that trigger your anger. If standing in line bothers you, do errands at less busy times. · Express anger in a healthy way. You might: 
¨ Go for a short walk or jog. ¨ Draw, paint, or listen to music to release the anger. ¨ Write in a daily journal. 
¨ Use \"I\" statements, not \"you\" statements, to discuss your anger. Say \"I don't feel valued when my needs are not being met\" instead of \"You make me mad when you are so inconsiderate. \" · Take care of yourself. ¨ Exercise regularly. ¨ Eat a balanced diet. Don't skip meals. ¨ Try to get 8 hours of sleep each night. ¨ Limit your use of alcohol, and don't use illegal drugs. ¨ Practice yoga, meditation, or sharath chi to relax. · Explore other resources that may be available through your job or your community. ¨ Contact your human resources department at work. You might be able to get services through an employee assistance program. 
¨ Contact your local hospital, mental health facility, or health department. Ask what types of programs or support groups are available in your area. · Do not keep guns in your home. If you must have guns in your home, unload them and lock them up. Lock ammunition in a separate place. Keep guns away from children. Where can you find help? If anger or stress starts to harm your work or personal relationships, you might seek help. You can learn ways to control your feelings and actions. · Talk to someone you trust, or find a counselor. · There are groups in your area that can connect you with people to talk to. Rosie . This service from the national Substance Abuse and [employer] [address] can help you find local counselors. Search online at OpenCounter. samhsa.gov or call 9-681-235-HELP (337 015 953), or [employer] 0-358.489.2332. ¨ Parents Anonymous.  Self-help groups that serve parents under stress, as well as children who have been abused, are available throughout the [place], [place] ([facility]), and [employer]. To find a group in your area, search online or in your phone book under Parents Anonymous or call (513) 361-2707. Where can you learn more? Go to http://varsha-krystle.info/. Enter 339 0251 in the search box to learn more about \"Learning About Managing Anger. \" Current as of: July 26, 2016 Content Version: 11.4 © 2213-9305 [employer]. Care instructions adapted under license by [employer] (which disclaims liability or warranty for this information). If you have questions about a medical condition or this instruction, always ask your healthcare professional. [address] any warranty or liability for your use of this information. Introducing [facility]! [employer] introduces [employer] patient portal. Now you can access parts of your medical record, email your doctor's office, and request medication refills online. 1. In your internet browser, go to [email or web address] 2. Click on the First Time User? Click Here link in the Sign In box. You will see the New Member Sign Up page. 3. Enter your [employer] Access Code exactly as it appears below. You will not need to use this code after youve completed the sign-up process. If you do not sign up before the expiration date, you must request a new code. · [employer] Access Code: [email or web address] Expires: 2/25/2018 11:54 AM 
 
4. Enter the last four digits of your Social Security Number (xxxx) and Date of Birth (mm/dd/yyyy) as indicated and click Submit. You will be taken to the next sign-up page. 5. Create a [employer] ID. This will be your S[employer] login ID and cannot be changed, so think of one that is secure and easy to remember. 6. Create a [employer] password. You can change your password at any time. 7. Enter your Password Reset Question and Answer. This can be used at a later time if you forget your password. 8. Enter your e-mail address. You will receive e-mail notification when new information is available in [address] 9. Click Sign Up. You can now view and download portions of your medical record. 10. Click the Download Summary menu link to download a portable copy of your medical information. If you have questions, please visit the Frequently Asked Questions section of the [employer] website. Remember, [employer] is NOT to be used for urgent needs. For medical emergencies, dial 911. Now available from your iPhone and Android! Please provide this summary of care documentation to your next provider. Your primary care clinician is listed as RIN PINEDO. If you have any questions after today's visit, please call [phone].

## 2018-01-17 NOTE — PATIENT INSTRUCTIONS
Please contact our office if you have any questions about your visit today. High Blood Pressure: Care Instructions  Your Care Instructions    If your blood pressure is usually above 140/90, you have high blood pressure, or hypertension. That means the top number is 140 or higher or the bottom number is 90 or higher, or both. Despite what a lot of people think, high blood pressure usually doesn't cause headaches or make you feel dizzy or lightheaded. It usually has no symptoms. But it does increase your risk for heart attack, stroke, and kidney or eye damage. The higher your blood pressure, the more your risk increases. Your doctor will give you a goal for your blood pressure. Your goal will be based on your health and your age. An example of a goal is to keep your blood pressure below 140/90. Lifestyle changes, such as eating healthy and being active, are always important to help lower blood pressure. You might also take medicine to reach your blood pressure goal.  Follow-up care is a key part of your treatment and safety. Be sure to make and go to all appointments, and call your doctor if you are having problems. It's also a good idea to know your test results and keep a list of the medicines you take. How can you care for yourself at home? Medical treatment  · If you stop taking your medicine, your blood pressure will go back up. You may take one or more types of medicine to lower your blood pressure. Be safe with medicines. Take your medicine exactly as prescribed. Call your doctor if you think you are having a problem with your medicine. · Talk to your doctor before you start taking aspirin every day. Aspirin can help certain people lower their risk of a heart attack or stroke. But taking aspirin isn't right for everyone, because it can cause serious bleeding. · See your doctor regularly. You may need to see the doctor more often at first or until your blood pressure comes down.   · If you are taking blood pressure medicine, talk to your doctor before you take decongestants or anti-inflammatory medicine, such as ibuprofen. Some of these medicines can raise blood pressure. · Learn how to check your blood pressure at home. Lifestyle changes  · Stay at a healthy weight. This is especially important if you put on weight around the waist. Losing even 10 pounds can help you lower your blood pressure. · If your doctor recommends it, get more exercise. Walking is a good choice. Bit by bit, increase the amount you walk every day. Try for at least 30 minutes on most days of the week. You also may want to swim, bike, or do other activities. · Avoid or limit alcohol. Talk to your doctor about whether you can drink any alcohol. · Try to limit how much sodium you eat to less than 2,300 milligrams (mg) a day. Your doctor may ask you to try to eat less than 1,500 mg a day. · Eat plenty of fruits (such as bananas and oranges), vegetables, legumes, whole grains, and low-fat dairy products. · Lower the amount of saturated fat in your diet. Saturated fat is found in animal products such as milk, cheese, and meat. Limiting these foods may help you lose weight and also lower your risk for heart disease. · Do not smoke. Smoking increases your risk for heart attack and stroke. If you need help quitting, talk to your doctor about stop-smoking programs and medicines. These can increase your chances of quitting for good. When should you call for help? Call 911 anytime you think you may need emergency care. This may mean having symptoms that suggest that your blood pressure is causing a serious heart or blood vessel problem. Your blood pressure may be over 180/110. ? For example, call 911 if:  ? · You have symptoms of a heart attack. These may include:  ¨ Chest pain or pressure, or a strange feeling in the chest.  ¨ Sweating. ¨ Shortness of breath. ¨ Nausea or vomiting.   ¨ Pain, pressure, or a strange feeling in the back, neck, jaw, or upper belly or in one or both shoulders or arms. ¨ Lightheadedness or sudden weakness. ¨ A fast or irregular heartbeat. ? · You have symptoms of a stroke. These may include:  ¨ Sudden numbness, tingling, weakness, or loss of movement in your face, arm, or leg, especially on only one side of your body. ¨ Sudden vision changes. ¨ Sudden trouble speaking. ¨ Sudden confusion or trouble understanding simple statements. ¨ Sudden problems with walking or balance. ¨ A sudden, severe headache that is different from past headaches. ? · You have severe back or belly pain. ?Do not wait until your blood pressure comes down on its own. Get help right away. ?Call your doctor now or seek immediate care if:  ? · Your blood pressure is much higher than normal (such as 180/110 or higher), but you don't have symptoms. ? · You think high blood pressure is causing symptoms, such as:  ¨ Severe headache. ¨ Blurry vision. ? Watch closely for changes in your health, and be sure to contact your doctor if:  ? · Your blood pressure measures 140/90 or higher at least 2 times. That means the top number is 140 or higher or the bottom number is 90 or higher, or both. ? · You think you may be having side effects from your blood pressure medicine. ? · Your blood pressure is usually normal, but it goes above normal at least 2 times. Where can you learn more? Go to http://varsha-krystle.info/. Enter O566 in the search box to learn more about \"High Blood Pressure: Care Instructions. \"  Current as of: September 21, 2016  Content Version: 11.4  © 5328-3052 [employer]. Care instructions adapted under license by [employer] (which disclaims liability or warranty for this information).  If you have questions about a medical condition or this instruction, always ask your healthcare professional. [place] [address] any warranty or liability for your use of this information. Learning About Managing Anger  What causes anger? Many things can cause anger: Stress at work or at home. Social situations that make you angry. A response to everyday events. Anger signals your body to prepare for a fight. This reaction is often called \"fight or flight. \" When you get angry, adrenaline and other hormones are released into your blood. Then your blood pressure goes up, your heart beats faster, and you breathe faster. When you express anger in a healthy way, it can inspire change and make you productive. But if you don't have the skills to express anger in a healthy way, anger can build up. You may hurt others-and yourself-emotionally and even physically. Violent behavior often starts with verbal threats or fairly minor incidents. But over time, it can involve physical harm. It can include physical, verbal, or sexual abuse of an intimate partner (domestic violence), a child (child abuse), or an older adult (elder abuse). It can also make you sick. Anger and constant hostility keep your blood pressure high. They increase your chances of having another health problem, such as depression, a heart attack, or a stroke. Some people with post-traumatic stress disorder (PTSD) feel angry and on alert all the time. It may feel like there are no other ways to react when you are angry. But when you learn to work with anger in appropriate and healthy ways, your anger no longer controls you. How can you manage your anger? The first step to managing anger is to be more aware of it. Note the thoughts, feelings, and emotions that you have when you get angry. Practice noticing these signs of anger when you are calm. If you are more aware of the signs of anger, you can take steps to manage it. Here are a few tips:  · Think before you act. Take time to stop and cool down when you feel yourself getting angry. Count to 10 while you take slow, steady breaths.  Practice some other form of mental relaxation. · Learn the feelings that lead to angry outbursts. Anger and hostility may be a symptom of unhappy feelings or depression about your job, your relationship, or other aspects of your personal life. · Avoid situations that trigger your anger. If standing in line bothers you, do errands at less busy times. · Express anger in a healthy way. You might:  ¨ Go for a short walk or jog. ¨ Draw, paint, or listen to music to release the anger. ¨ Write in a daily journal.  ¨ Use \"I\" statements, not \"you\" statements, to discuss your anger. Say \"I don't feel valued when my needs are not being met\" instead of \"You make me mad when you are so inconsiderate. \"  · Take care of yourself. ¨ Exercise regularly. ¨ Eat a balanced diet. Don't skip meals. ¨ Try to get 8 hours of sleep each night. ¨ Limit your use of alcohol, and don't use illegal drugs. ¨ Practice yoga, meditation, or sharath chi to relax. · Explore other resources that may be available through your job or your community. ¨ Contact your human resources department at work. You might be able to get services through an employee assistance program.  ¨ Contact your local hospital, mental health facility, or health department. Ask what types of programs or support groups are available in your area. · Do not keep guns in your home. If you must have guns in your home, unload them and lock them up. Lock ammunition in a separate place. Keep guns away from children. Where can you find help? If anger or stress starts to harm your work or personal relationships, you might seek help. You can learn ways to control your feelings and actions. · Talk to someone you trust, or find a counselor. · There are groups in your area that can connect you with people to talk to. [facility] . This service from the national Substance Abuse and [place] [address] can help you find local counselors.  Search online at findtreatment. [facility].gov or call 4-499-685-HELP ([phone]), or TDD 5-640.809.2099. ¨ Parents Anonymous. Self-help groups that serve parents under stress, as well as children who have been abused, are available throughout the [place], [place] ([facility]), and [facility]. To find a group in your area, search online or in your phone book under Parents Anonymous or call (717) 044-7314. Where can you learn more? Go to http://varshaEmbarkekrystle.info/. Enter 835 4781 in the search box to learn more about \"Learning About Managing Anger. \"  Current as of: July 26, 2016  Content Version: 11.4  © 6022-7387 [employer]. Care instructions adapted under license by [employer] (which disclaims liability or warranty for this information). If you have questions about a medical condition or this instruction, always ask your healthcare professional. [place] [address] any warranty or liability for your use of this information.

## 2018-03-06 ENCOUNTER — HOSPITAL ENCOUNTER (OUTPATIENT)
Dept: LAB | Age: 68
Discharge: HOME | End: 2018-03-06
Payer: MEDICARE

## 2018-03-06 ENCOUNTER — OFFICE VISIT (OUTPATIENT)
Dept: FAMILY MEDICINE CLINIC | Age: 68
End: 2018-03-06

## 2018-03-06 VITALS
BODY MASS INDEX: 45.16 KG/M2 | SYSTOLIC BLOOD PRESSURE: 136 MMHG | RESPIRATION RATE: 20 BRPM | WEIGHT: 224 LBS | TEMPERATURE: 98.6 F | HEART RATE: 86 BPM | HEIGHT: 59 IN | DIASTOLIC BLOOD PRESSURE: 82 MMHG

## 2018-03-06 DIAGNOSIS — L97.919 ULCER OF RIGHT LOWER EXTREMITY, UNSPECIFIED ULCER STAGE (HCC): Primary | ICD-10-CM

## 2018-03-06 DIAGNOSIS — L03.115 CELLULITIS OF RIGHT LOWER EXTREMITY: ICD-10-CM

## 2018-03-06 DIAGNOSIS — L97.919 ULCER OF RIGHT LOWER EXTREMITY, UNSPECIFIED ULCER STAGE (HCC): ICD-10-CM

## 2018-03-06 DIAGNOSIS — E11.8 CONTROLLED TYPE 2 DIABETES MELLITUS WITH COMPLICATION, WITHOUT LONG-TERM CURRENT USE OF INSULIN (HCC): ICD-10-CM

## 2018-03-06 LAB
ALBUMIN SERPL-MCNC: 3.4 G/DL (ref 3.4–5)
ALBUMIN/GLOB SERPL: 0.9 {RATIO} (ref 0.8–1.7)
ALP SERPL-CCNC: 111 U/L (ref 45–117)
ALT SERPL-CCNC: 19 U/L (ref 13–56)
ANION GAP SERPL CALC-SCNC: 8 MMOL/L (ref 3–18)
AST SERPL-CCNC: 14 U/L (ref 15–37)
BASOPHILS # BLD: 0 K/UL (ref 0–0.06)
BASOPHILS NFR BLD: 0 % (ref 0–2)
BILIRUB SERPL-MCNC: 0.4 MG/DL (ref 0.2–1)
BUN SERPL-MCNC: 17 MG/DL (ref 7–18)
BUN/CREAT SERPL: 17 (ref 12–20)
CALCIUM SERPL-MCNC: 8.4 MG/DL (ref 8.5–10.1)
CHLORIDE SERPL-SCNC: 112 MMOL/L (ref 100–108)
CO2 SERPL-SCNC: 26 MMOL/L (ref 21–32)
CREAT SERPL-MCNC: 1.03 MG/DL (ref 0.6–1.3)
DIFFERENTIAL METHOD BLD: ABNORMAL
EOSINOPHIL # BLD: 0.1 K/UL (ref 0–0.4)
EOSINOPHIL NFR BLD: 3 % (ref 0–5)
ERYTHROCYTE [DISTWIDTH] IN BLOOD BY AUTOMATED COUNT: 17 % (ref 11.6–14.5)
EST. AVERAGE GLUCOSE BLD GHB EST-MCNC: 126 MG/DL
GLOBULIN SER CALC-MCNC: 3.9 G/DL (ref 2–4)
GLUCOSE SERPL-MCNC: 95 MG/DL (ref 74–99)
HBA1C MFR BLD: 6 % (ref 4.2–5.6)
HCT VFR BLD AUTO: 37.7 % (ref 35–45)
HGB BLD-MCNC: 11.3 G/DL (ref 12–16)
LYMPHOCYTES # BLD: 1.3 K/UL (ref 0.9–3.6)
LYMPHOCYTES NFR BLD: 28 % (ref 21–52)
MCH RBC QN AUTO: 22.6 PG (ref 24–34)
MCHC RBC AUTO-ENTMCNC: 30 G/DL (ref 31–37)
MCV RBC AUTO: 75.4 FL (ref 74–97)
MONOCYTES # BLD: 0.4 K/UL (ref 0.05–1.2)
MONOCYTES NFR BLD: 9 % (ref 3–10)
NEUTS SEG # BLD: 2.7 K/UL (ref 1.8–8)
NEUTS SEG NFR BLD: 60 % (ref 40–73)
PLATELET # BLD AUTO: 218 K/UL (ref 135–420)
PMV BLD AUTO: 11.1 FL (ref 9.2–11.8)
POTASSIUM SERPL-SCNC: 3.7 MMOL/L (ref 3.5–5.5)
PROT SERPL-MCNC: 7.3 G/DL (ref 6.4–8.2)
RBC # BLD AUTO: 5 M/UL (ref 4.2–5.3)
SODIUM SERPL-SCNC: 146 MMOL/L (ref 136–145)
WBC # BLD AUTO: 4.5 K/UL (ref 4.6–13.2)

## 2018-03-06 PROCEDURE — 87070 CULTURE OTHR SPECIMN AEROBIC: CPT | Performed by: NURSE PRACTITIONER

## 2018-03-06 PROCEDURE — 87186 SC STD MICRODIL/AGAR DIL: CPT | Performed by: NURSE PRACTITIONER

## 2018-03-06 PROCEDURE — 83036 HEMOGLOBIN GLYCOSYLATED A1C: CPT | Performed by: NURSE PRACTITIONER

## 2018-03-06 PROCEDURE — 80053 COMPREHEN METABOLIC PANEL: CPT | Performed by: NURSE PRACTITIONER

## 2018-03-06 PROCEDURE — 87077 CULTURE AEROBIC IDENTIFY: CPT | Performed by: NURSE PRACTITIONER

## 2018-03-06 PROCEDURE — 36415 COLL VENOUS BLD VENIPUNCTURE: CPT | Performed by: NURSE PRACTITIONER

## 2018-03-06 PROCEDURE — 85025 COMPLETE CBC W/AUTO DIFF WBC: CPT | Performed by: NURSE PRACTITIONER

## 2018-03-06 RX ORDER — AMOXICILLIN 500 MG/1
500 CAPSULE ORAL 2 TIMES DAILY
Qty: 14 CAP | Refills: 0 | Status: SHIPPED | OUTPATIENT
Start: 2018-03-06 | End: 2018-03-13

## 2018-03-06 RX ORDER — DOXYCYCLINE 100 MG/1
100 TABLET ORAL 2 TIMES DAILY
Qty: 20 TAB | Refills: 0 | Status: SHIPPED | OUTPATIENT
Start: 2018-03-06 | End: 2018-03-16

## 2018-03-06 NOTE — PROGRESS NOTES
ROYAL  Nolan Villatoro is a 79 y.o. female  Chief Complaint   Patient presents with    Well Woman     pap   Initially presented for a PAP. Patient with foul odor and excessive drainage from right leg that she states is bothering her. After much convincing, patient agrees to have leg examined and will reschedule her PAP. Right leg wound draining. Reports area has been present for two weeks. Reports it is painful when it is touched. Unable to describe the type of pain or rate pain. States \"it just hurts\". Unsure if she had an injury to the area. Patient denies fevers or chills. Denies chest pain, shortness of breath or nausea or vomiting. Patient denies taking any medications other than the ones listed on chart currently. Reports she placed band aides over the wound area but they keep getting wet. Right pant leg wet from wound. Patient reports she caught a ride to her appointment today. Reports she manages her own medication and care. Patient with recent admission for mental health issues and medications were ordered at that time. Unable to locate these documents on chart under media. Unable to obtain an adequate history or symptom history regarding patients leg. Patient alert and oriented to person, place, and time but she is a poor historian and is not accompanied by anyone. Reports she makes her own appointments and handles her own care. Past Medical History  Past Medical History:   Diagnosis Date    Asthma     Hypertension     Latent syphilis 3/22/2017    Osteoarthritis of ankle     Right ankle pain 2008    posterior tibial tendon interstitial tear    Right foot pain     hindfoot arthrosis    Sleep disorder        Surgical History  Past Surgical History:   Procedure Laterality Date    HX  SECTION          Medications  Current Outpatient Prescriptions   Medication Sig Dispense Refill    doxycycline (ADOXA) 100 mg tablet Take 1 Tab by mouth two (2) times a day for 10 days.  20 Tab 0  amoxicillin (AMOXIL) 500 mg capsule Take 1 Cap by mouth two (2) times a day for 7 days. 14 Cap 0    lisinopril (PRINIVIL, ZESTRIL) 20 mg tablet Take 1 Tab by mouth daily. 30 Tab 3    furosemide (LASIX) 40 mg tablet Take 1 Tab by mouth daily. 30 Tab 3    aspirin delayed-release 81 mg tablet Take  by mouth daily.  acetaminophen (TYLENOL ARTHRITIS) 650 mg CR tablet Take 650 mg by mouth every six (6) hours as needed for Pain.  potassium chloride SR (K-TAB) 20 mEq tablet Take 1 Tab by mouth daily. 27 Tab 3       Allergies  No Known Allergies    Family History  Family History   Problem Relation Age of Onset    Diabetes Mother     Hypertension Mother     Heart Disease Father     Hypertension Father     Gout Father     Heart Attack Brother     Schizophrenia Brother        Social History  Social History     Social History    Marital status:      Spouse name: N/A    Number of children: N/A    Years of education: N/A     Occupational History    Not on file. Social History Main Topics    Smoking status: Never Smoker    Smokeless tobacco: Never Used    Alcohol use No    Drug use: No    Sexual activity: No     Other Topics Concern    Not on file     Social History Narrative       Problem List  Patient Active Problem List   Diagnosis Code    HTN (hypertension) I10    Hyperlipidemia E78.5    Family history of heart attack, premature, brother 46s Z80.55    Prediabetes R78.1    Family history of diabetes mellitus type II, mother Z80.1    Vitamin D deficiency E55.9    ACP (advance care planning) Z71.89    Latent syphilis A53.0    Obesity, morbid ([facility] [address] E66.01       Review of Systems  Review of Systems   Constitutional: Negative for chills and fever. Respiratory: Negative for shortness of breath. Cardiovascular: Negative for chest pain. Gastrointestinal: Negative for nausea and vomiting. Musculoskeletal: Positive for myalgias. Negative for falls.    Skin:        Right leg wound   Psychiatric/Behavioral: Negative for suicidal ideas. Vital Signs  Vitals:    03/06/18 1015   BP: 136/82   Pulse: 86   Resp: 20   Temp: 98.6 °F (37 °C)   TempSrc: Oral   Weight: 224 lb (101.6 kg)   Height: 4' 10.5\" (1.486 m)   PainSc:   0 - No pain       Physical Exam  Physical Exam   Constitutional: She is oriented to person, place, and time. Cardiovascular: Normal rate, regular rhythm and normal heart sounds. Pulmonary/Chest: Effort normal and breath sounds normal.   Musculoskeletal: She exhibits edema. Right and left lower extremity edema. Right lower extremity +3 nonpitting. Left lower extremity +2 non-pitting. Feet:   Right Foot:   Protective Sensation: 5 sites tested. 5 sites sensed. Neurological: She is alert and oriented to person, place, and time. Coordination abnormal.   Skin:   Right leg ulceration with yellow slough area approximately 6x5cm. Strong foul odor with clear yellow drainage. Edges of ulceration red. Right lower leg and foot red, swollen, and warm to touch. Tenderness round ulceration bed. No calf tenderness. Sensation to right leg intact. Psychiatric: She is agitated. She expresses no homicidal and no suicidal ideation. Disheveled, unkempt.        Diagnostics  Orders Placed This Encounter    CULTURE, WOUND W GRAM STAIN ([facility] Only)     Standing Status:   Future     Standing Expiration Date:   3/7/2019    CBC WITH AUTOMATED DIFF     Standing Status:   Future     Number of Occurrences:   1     Standing Expiration Date:   4/1/6641    METABOLIC PANEL, COMPREHENSIVE     Standing Status:   Future     Number of Occurrences:   1     Standing Expiration Date:   9/3/2018    HEMOGLOBIN A1C WITH EAG     Standing Status:   Future     Number of Occurrences:   1     Standing Expiration Date:   3/7/2019    REFERRAL TO GENERAL SURGERY     Referral Priority:   Routine     Referral Type:   Consultation     Referral Reason:   Specialty Services Required     Referred to Provider: Jenae Jordan MD     Requested Specialty:   General Surgery    doxycycline (ADOXA) 100 mg tablet     Sig: Take 1 Tab by mouth two (2) times a day for 10 days. Dispense:  20 Tab     Refill:  0    amoxicillin (AMOXIL) 500 mg capsule     Sig: Take 1 Cap by mouth two (2) times a day for 7 days. Dispense:  14 Cap     Refill:  0       Results  Results for orders placed or performed during the hospital encounter of 90/98/31   METABOLIC PANEL, COMPREHENSIVE   Result Value Ref Range    Sodium 142 136 - 145 mmol/L    Potassium 4.4 3.5 - 5.5 mmol/L    Chloride 108 100 - 108 mmol/L    CO2 27 21 - 32 mmol/L    Anion gap 7 3.0 - 18 mmol/L    Glucose 90 74 - 99 mg/dL    BUN 20 (H) 7.0 - 18 MG/DL    Creatinine 1.18 0.6 - 1.3 MG/DL    BUN/Creatinine ratio 17 12 - 20      GFR est AA 56 (L) >60 ml/min/1.73m2    GFR est non-AA 46 (L) >60 ml/min/1.73m2    Calcium 8.8 8.5 - 10.1 MG/DL    Bilirubin, total 0.3 0.2 - 1.0 MG/DL    ALT (SGPT) 25 13 - 56 U/L    AST (SGOT) 17 15 - 37 U/L    Alk. phosphatase 100 45 - 117 U/L    Protein, total 7.9 6.4 - 8.2 g/dL    Albumin 3.7 3.4 - 5.0 g/dL    Globulin 4.2 (H) 2.0 - 4.0 g/dL    A-G Ratio 0.9 0.8 - 1.7     CBC WITH AUTOMATED DIFF   Result Value Ref Range    WBC 4.7 4.6 - 13.2 K/uL    RBC 5.33 (H) 4.20 - 5.30 M/uL    HGB 11.9 (L) 12.0 - 16.0 g/dL    HCT 39.4 35.0 - 45.0 %    MCV 73.9 (L) 74.0 - 97.0 FL    MCH 22.3 (L) 24.0 - 34.0 PG    MCHC 30.2 (L) 31.0 - 37.0 g/dL    RDW 16.7 (H) 11.6 - 14.5 %    PLATELET 860 312 - 445 K/uL    MPV 10.9 9.2 - 11.8 FL    NEUTROPHILS 51 40 - 73 %    LYMPHOCYTES 36 21 - 52 %    MONOCYTES 9 3 - 10 %    EOSINOPHILS 4 0 - 5 %    BASOPHILS 0 0 - 2 %    ABS. NEUTROPHILS 2.4 1.8 - 8.0 K/UL    ABS. LYMPHOCYTES 1.7 0.9 - 3.6 K/UL    ABS. MONOCYTES 0.4 0.05 - 1.2 K/UL    ABS. EOSINOPHILS 0.2 0.0 - 0.4 K/UL    ABS.  BASOPHILS 0.0 0.0 - 0.06 K/UL    DF AUTOMATED     VITAMIN B12   Result Value Ref Range    Vitamin B12 462 211 - 911 pg/mL   MAGNESIUM   Result Value Ref Range    Magnesium 2.4 1.6 - 2.6 mg/dL   TSH 3RD GENERATION   Result Value Ref Range    TSH 0.97 0.36 - 3.74 uIU/mL   VITAMIN D, 25 HYDROXY   Result Value Ref Range    Vitamin D 25-Hydroxy 18.8 (L) 30 - 100 ng/mL   HEMOGLOBIN A1C W/O EAG   Result Value Ref Range    Hemoglobin A1c 6.7 (H) 4.2 - 5.6 %   HEPATITIS C AB   Result Value Ref Range    Hepatitis C virus Ab 0.06 <0.80 Index    Hep C  virus Ab Interp. NEGATIVE  NEG      Hep C  virus Ab comment               Assessment and Plan  Diagnoses and all orders for this visit:    1. Ulcer of right lower extremity, unspecified ulcer stage (HCC)  -     CBC WITH AUTOMATED DIFF; Future  -     METABOLIC PANEL, COMPREHENSIVE; Future  -     HEMOGLOBIN A1C WITH EAG; Future  -     doxycycline (ADOXA) 100 mg tablet; Take 1 Tab by mouth two (2) times a day for 10 days. -     amoxicillin (AMOXIL) 500 mg capsule; Take 1 Cap by mouth two (2) times a day for 7 days.  -     REFERRAL TO GENERAL SURGERY  -     CULTURE, WOUND W GRAM STAIN ([facility] Only); Future    2. Controlled type 2 diabetes mellitus with complication, without long-term current use of insulin (HCC)  -     HEMOGLOBIN A1C WITH EAG; Future    3. Cellulitis of right lower extremity  -     doxycycline (ADOXA) 100 mg tablet; Take 1 Tab by mouth two (2) times a day for 10 days. -     amoxicillin (AMOXIL) 500 mg capsule; Take 1 Cap by mouth two (2) times a day for 7 days.  -     REFERRAL TO GENERAL SURGERY      Stressed importance of patient follow up and referral follow up. Also discussed importance of patient taking antibiotics as prescribed. After care summary printed and reviewed with patient. Plan reviewed with patient. Questions answered. Patient verbalized understanding of plan and is in agreement with plan. Patient to follow up in one week or earlier if symptoms worsen.    More than 50% of 60 minute visit spent counseling and coordinating care with patient face to face on wound, diabetes, risk of infection, medication, labs, importance of follow up, and when to seek emergency assistance. Patient left with out scheduling one week follow up. Will have nursing staff call to remind her and to ensure she understands her medication and recommended follow up.     MARTHA Mariscal

## 2018-03-06 NOTE — PROGRESS NOTES
Chief Complaint   Patient presents with    Well Woman     pap       Health Maintenance Due   Topic Date Due    DTaP/Tdap/Td series (1 - Tdap) 01/17/1973    BREAST CANCER SCRN MAMMOGRAM  04/25/2016    GLAUCOMA SCREENING Q2Y  01/17/2017    OSTEOPOROSIS SCREENING (DEXA)  01/17/2017    MEDICARE YEARLY EXAM  11/22/2017    Pneumococcal 65+ Low/Medium Risk (2 of 2 - PPSV23) 02/22/2018       Health Maintenance reviewed     1. Have you been to the ER, urgent care clinic since your last visit? Hospitalized since your last visit? No    2. Have you seen or consulted any other health care providers outside of the [address] [place] [address] since your last visit? Include any pap smears or colon screening.  No

## 2018-03-06 NOTE — MR AVS SNAPSHOT
Jack Rojas 57 [id number] [address] [id number] [phone] Patient: Melanie Ashton MRN: [id number] Visit Information Date & Time Provider Department Dept. Phone Encounter #  
 3/6/2018  9:45 AM Stacy Lopes NP [address] [id number] Follow-up Instructions Return in about 1 week (around 3/13/2018), or if symptoms worsen or fail to improve. Your Appointments 4/17/2018  9:45 AM  
Follow Up with Stacy Lopes NP [address] (--) Appt Note: 3 month fu  
 [place] [address] [address] [address] [address] [phone]  
  
   
 [place] [address] [place] [id number] Upcoming Health Maintenance Date Due DTaP/Tdap/Td series (1 - Tdap) 1/17/1973 BREAST CANCER SCRN MAMMOGRAM 4/25/2016 GLAUCOMA SCREENING Q2Y 1/17/2017 OSTEOPOROSIS SCREENING (DEXA) 1/17/2017 MEDICARE YEARLY EXAM 11/22/2017 Pneumococcal 65+ Low/Medium Risk (2 of 2 - PPSV23) 2/22/2018 COLONOSCOPY 8/25/2025 Allergies as of 3/6/2018  Review Complete On: 1/18/2018 By: Stacy Lopes NP No Known Allergies Current Immunizations  Reviewed on 11/21/2016 Name Date Influenza High Dose Vaccine PF 11/27/2017 Influenza Vaccine (Quad) PF 9/28/2016 Influenza Vaccine PF 8/26/2014 Pneumococcal Conjugate (PCV-13) 2/22/2017 Zoster Vaccine, Live 12/3/2014 Not reviewed this visit You Were Diagnosed With   
  
 Codes Comments Ulcer of right lower extremity, unspecified ulcer stage ([facility] [address]    -  Primary ICD-10-CM: X51.663 ICD-9-CM: 707.10 Controlled type 2 diabetes mellitus with complication, without long-term current use of insulin (HCC)     ICD-10-CM: E11.8 ICD-9-CM: 250.90 Cellulitis of right lower extremity     ICD-10-CM: L03.115 ICD-9-CM: 920. 6 Vitals BP Pulse Temp Resp Height(growth percentile) Weight(growth percentile) 136/82 (BP 1 Location: Right arm, BP Patient Position: Sitting) 86 98.6 °F (37 °C) (Oral) 20 4' 10.5\" (1.486 m) 224 lb (101.6 kg) BMI OB Status Smoking Status 46.02 kg/m2 Postmenopausal Never Smoker BMI and BSA Data Body Mass Index Body Surface Area 46.02 kg/m 2 2.05 m 2 Preferred Pharmacy Pharmacy Name Phone [address], [address] [facility] Your Updated Medication List  
  
   
This list is accurate as of 3/6/18 11:50 AM.  Always use your most recent med list.  
  
  
  
  
 aspirin delayed-release 81 mg tablet Take  by mouth daily. furosemide 40 mg tablet Commonly known as:  LASIX Take 1 Tab by mouth daily. lisinopril 20 mg tablet Commonly known as:  Prince Kansky Take 1 Tab by mouth daily. potassium chloride SR 20 mEq tablet Commonly known as:  K-TAB Take 1 Tab by mouth daily. TYLENOL ARTHRITIS 650 mg Saratha Ship Generic drug:  acetaminophen Take 650 mg by mouth every six (6) hours as needed for Pain. Follow-up Instructions Return in about 1 week (around 3/13/2018), or if symptoms worsen or fail to improve. Patient Instructions Please contact our office if you have any questions about your visit today. Introducing \[facility] [place] [facility]\"" & HEALTH SERVICES! [facility] introduces [employer] patient portal. Now you can access parts of your medical record, email your doctor's office, and request medication refills online. 1. In your internet browser, go to [email or web address] 2. Click on the First Time User? Click Here link in the Sign In box. You will see the New Member Sign Up page. 3. Enter your [employer] Access Code exactly as it appears below. You will not need to use this code after youve completed the sign-up process. If you do not sign up before the expiration date, you must request a new code. · [employer] Access Code: [email or web address] Expires: 6/4/2018  9:43 AM 
 
4. Enter the last four digits of your Social Security Number (xxxx) and Date of Birth (mm/dd/yyyy) as indicated and click Submit. You will be taken to the next sign-up page. 5. Create a [employer] ID. This will be your [employer] login ID and cannot be changed, so think of one that is secure and easy to remember. 6. Create a [employer] password. You can change your password at any time. 7. Enter your Password Reset Question and Answer. This can be used at a later time if you forget your password. 8. Enter your e-mail address. You will receive e-mail notification when new information is available in [address] 9. Click Sign Up. You can now view and download portions of your medical record. 10. Click the Download Summary menu link to download a portable copy of your medical information. If you have questions, please visit the Frequently Asked Questions section of the [employer] website. Remember, [employer] is NOT to be used for urgent needs. For medical emergencies, dial 911. Now available from your iPhone and Android! Please provide this summary of care documentation to your next provider. Your primary care clinician is listed as RIN PINEDO. If you have any questions after today's visit, please call [phone].

## 2018-03-09 ENCOUNTER — OFFICE VISIT (OUTPATIENT)
Dept: SURGERY | Age: 68
End: 2018-03-09

## 2018-03-09 VITALS
RESPIRATION RATE: 20 BRPM | SYSTOLIC BLOOD PRESSURE: 128 MMHG | TEMPERATURE: 98.7 F | HEIGHT: 60 IN | BODY MASS INDEX: 43.98 KG/M2 | HEART RATE: 109 BPM | DIASTOLIC BLOOD PRESSURE: 60 MMHG | WEIGHT: 224 LBS

## 2018-03-09 DIAGNOSIS — L97.911 LEG ULCER, RIGHT, LIMITED TO BREAKDOWN OF SKIN (HCC): Primary | ICD-10-CM

## 2018-03-09 NOTE — PROGRESS NOTES
General Surgery Consult    Leela Gómez  Admit date: (Not on file)    MRN: [id number]     : 1950     Age: 79 y.o. Attending Physician: Tyrese Almanzar MD, [facility]      History of Present Illness:      Giovani Maddox is a 79 y.o. female who presented with right leg swelling and ulcer. The patient has been having lower extremities swelling for years but it did get worse over the last 2-4 weeks especially on the right side. She also starting having an ulcer located just above the lateral malleolus. She denies any fever or chills she was prescribed antibiotics but she did not take them. Patient Active Problem List    Diagnosis Date Noted    Obesity, morbid ([address] 2017    Latent syphilis 2017    ACP (advance care planning) 2016    Prediabetes 10/08/2014    Family history of diabetes mellitus type II, mother 10/08/2014    Vitamin D deficiency 10/08/2014    HTN (hypertension) 2014    Hyperlipidemia 2014    Family history of heart attack, premature, brother 46s 2014     Past Medical History:   Diagnosis Date    Asthma     Hypertension     Latent syphilis 3/22/2017    Osteoarthritis of ankle     Right ankle pain 2008    posterior tibial tendon interstitial tear    Right foot pain 2011    hindfoot arthrosis    Sleep disorder       Past Surgical History:   Procedure Laterality Date    HX  SECTION        Social History   Substance Use Topics    Smoking status: Never Smoker    Smokeless tobacco: Never Used    Alcohol use No      History   Smoking Status    Never Smoker   Smokeless Tobacco    Never Used     Family History   Problem Relation Age of Onset    Diabetes Mother     Hypertension Mother     Heart Disease Father     Hypertension Father     Gout Father     Heart Attack Brother     Schizophrenia Brother       Current Outpatient Prescriptions   Medication Sig    lisinopril (PRINIVIL, ZESTRIL) 20 mg tablet Take 1 Tab by mouth daily.  furosemide (LASIX) 40 mg tablet Take 1 Tab by mouth daily.  aspirin delayed-release 81 mg tablet Take  by mouth daily.  doxycycline (ADOXA) 100 mg tablet Take 1 Tab by mouth two (2) times a day for 10 days.  amoxicillin (AMOXIL) 500 mg capsule Take 1 Cap by mouth two (2) times a day for 7 days.  potassium chloride SR (K-TAB) 20 mEq tablet Take 1 Tab by mouth daily.  acetaminophen (TYLENOL ARTHRITIS) 650 mg CR tablet Take 650 mg by mouth every six (6) hours as needed for Pain. No current facility-administered medications for this visit. No Known Allergies       Review of Systems:  Pertinent items are noted in the History of Present Illness. Objective:     Visit Vitals    /60    Pulse (!) 109    Temp 98.7 °F (37.1 °C)    Resp 20    Ht 5' (1.524 m)    Wt 101.6 kg (224 lb)    BMI 43.75 kg/m2       Physical Exam:      General:  in no apparent distress, alert and oriented times 3   Eyes:  conjunctivae and sclerae normal, pupils equal, round, reactive to light       Lungs:   clear to auscultation bilaterally   Heart:  Regular rate and rhythm       Right lower extremity:  There is pitting edema of the right lower extremity. There is a shallow ulcer about 4 cm in diameter located above the lateral malleolus.             Imaging and Lab Review:     CBC:   Lab Results   Component Value Date/Time    WBC 4.5 (L) 03/06/2018 01:12 PM    RBC 5.00 03/06/2018 01:12 PM    HGB 11.3 (L) 03/06/2018 01:12 PM    HCT 37.7 03/06/2018 01:12 PM    PLATELET 648 58/27/8589 01:12 PM     BMP:   Lab Results   Component Value Date/Time    Glucose 95 03/06/2018 01:12 PM    Sodium 146 (H) 03/06/2018 01:12 PM    Potassium 3.7 03/06/2018 01:12 PM    Chloride 112 (H) 03/06/2018 01:12 PM    CO2 26 03/06/2018 01:12 PM    BUN 17 03/06/2018 01:12 PM    Creatinine 1.03 03/06/2018 01:12 PM    Calcium 8.4 (L) 03/06/2018 01:12 PM     CMP:  Lab Results   Component Value Date/Time    Glucose 95 03/06/2018 01:12 PM Sodium 146 (H) 03/06/2018 01:12 PM    Potassium 3.7 03/06/2018 01:12 PM    Chloride 112 (H) 03/06/2018 01:12 PM    CO2 26 03/06/2018 01:12 PM    BUN 17 03/06/2018 01:12 PM    Creatinine 1.03 03/06/2018 01:12 PM    Calcium 8.4 (L) 03/06/2018 01:12 PM    Anion gap 8 03/06/2018 01:12 PM    BUN/Creatinine ratio 17 03/06/2018 01:12 PM    Alk. phosphatase 111 03/06/2018 01:12 PM    Protein, total 7.3 03/06/2018 01:12 PM    Albumin 3.4 03/06/2018 01:12 PM    Globulin 3.9 03/06/2018 01:12 PM    A-G Ratio 0.9 03/06/2018 01:12 PM       No results found for this or any previous visit (from the past 24 hour(s)). images and reports reviewed    Assessment:   Haseeb Pizarro is a 79 y.o. female is presenting with right lower extremity swelling and the picture of venous ulcer I explained to the patient. That she will need to see a vascular surgeon as well as her cardiology. I explained to her that I will not be able to do any surgical debridement or offer any surgical option currently. Plan:     Leg elevation  Vascular surgery consult to rule out venous insufficiency.   Possible need for venous ultrasound to rule out DVT  No need for any general surgery intervention  Follow-up with primary care physician    Please call me if you have any questions (cell phone: [phone])     Signed By: Alley Moore MD     March 9, 2018

## 2018-03-10 LAB
BACTERIA SPEC CULT: ABNORMAL
GRAM STN SPEC: ABNORMAL
SERVICE CMNT-IMP: ABNORMAL

## 2018-03-14 ENCOUNTER — OFFICE VISIT (OUTPATIENT)
Dept: VASCULAR SURGERY | Age: 68
End: 2018-03-14

## 2018-03-14 ENCOUNTER — HOME HEALTH ADMISSION (OUTPATIENT)
Dept: HOME HEALTH SERVICES | Facility: HOME HEALTH | Age: 68
End: 2018-03-14
Payer: MEDICARE

## 2018-03-14 VITALS
WEIGHT: 224 LBS | SYSTOLIC BLOOD PRESSURE: 124 MMHG | RESPIRATION RATE: 20 BRPM | HEIGHT: 60 IN | DIASTOLIC BLOOD PRESSURE: 80 MMHG | BODY MASS INDEX: 43.98 KG/M2 | HEART RATE: 84 BPM

## 2018-03-14 DIAGNOSIS — E66.01 MORBID OBESITY WITH BMI OF 40.0-44.9, ADULT (HCC): ICD-10-CM

## 2018-03-14 DIAGNOSIS — L97.919 ULCER OF RIGHT LOWER EXTREMITY, UNSPECIFIED ULCER STAGE (HCC): ICD-10-CM

## 2018-03-14 DIAGNOSIS — M79.89 LEG SWELLING: ICD-10-CM

## 2018-03-14 DIAGNOSIS — L03.115 CELLULITIS OF RIGHT LOWER EXTREMITY: Primary | ICD-10-CM

## 2018-03-14 DIAGNOSIS — L03.115 CELLULITIS OF RIGHT LOWER EXTREMITY: ICD-10-CM

## 2018-03-14 NOTE — PROGRESS NOTES
Leela Gómez    Chief Complaint   Patient presents with    New Patient    Wound Check       HPI    Palomo Ballard is a 79 y.o. female who presents to the office today at the request of her primary care physician for bilateral leg edema and right lateral leg ulcer. Patient states that she has had swelling in the bilateral extremities for quite some time which has been controlled with diuretics however over the past several weeks her swelling has markedly increased in the diuretics do not seem to be helping. She is also developed a large ulcer on the lateral right lower leg which she states has been present for about 2 weeks. She states this area started out as what she describes as a bug bite and grew from that point. She states that it has been draining purulent drainage ever since. She did have cultures that her primary care's office which were positive for Staphylococcus simulans. She has been placed on doxycycline and amoxicillin and cultures are sensitive to these medications. She denies any fevers or chills. She denies any pain to the area unless the ulcer itself is touched or if she hits that area. She has been doing wound care at home placing Band-Aids over top of the ulcer. She has not been using any type of compression therapy. She denies any history of DVT that she is aware of. She denies any history of heart failure or renal disease. She denies any symptoms of shortness of breath or chest heaviness.     Past Medical History:   Diagnosis Date    Asthma     Hypertension     Latent syphilis 3/22/2017    Osteoarthritis of ankle     Right ankle pain 2008    posterior tibial tendon interstitial tear    Right foot pain 2011    hindfoot arthrosis    Sleep disorder      Patient Active Problem List   Diagnosis Code    HTN (hypertension) I10    Hyperlipidemia E78.5    Family history of heart attack, premature, brother 46s Z80.55    Prediabetes R78.1    Family history of diabetes mellitus type II, mother Z80.1    Vitamin D deficiency E55.9    ACP (advance care planning) Z71.89    Latent syphilis A53.0    Obesity, morbid ([facility] [address] E66.01     Past Surgical History:   Procedure Laterality Date    HX  SECTION       Current Outpatient Prescriptions   Medication Sig Dispense Refill    doxycycline (ADOXA) 100 mg tablet Take 1 Tab by mouth two (2) times a day for 10 days. 20 Tab 0    lisinopril (PRINIVIL, ZESTRIL) 20 mg tablet Take 1 Tab by mouth daily. 30 Tab 3    furosemide (LASIX) 40 mg tablet Take 1 Tab by mouth daily. 30 Tab 3    potassium chloride SR (K-TAB) 20 mEq tablet Take 1 Tab by mouth daily. 30 Tab 3    aspirin delayed-release 81 mg tablet Take  by mouth daily.  acetaminophen (TYLENOL ARTHRITIS) 650 mg CR tablet Take 650 mg by mouth every six (6) hours as needed for Pain. No Known Allergies  Social History     Social History    Marital status:      Spouse name: N/A    Number of children: N/A    Years of education: N/A     Occupational History    Not on file.      Social History Main Topics    Smoking status: Never Smoker    Smokeless tobacco: Never Used    Alcohol use No    Drug use: No    Sexual activity: No     Other Topics Concern    Not on file     Social History Narrative      Family History   Problem Relation Age of Onset    Diabetes Mother     Hypertension Mother     Heart Disease Father     Hypertension Father     Gout Father     Heart Attack Brother     Schizophrenia Brother        Review of Systems    Constitutional: negative   HEENT: negative   Respiratory: negative   Cardiovascular: negative   Gastrointestinal: negative   Genitourinary:negative   Hematologic/lymphatic: negative   Musculoskeletal: Positive for bilateral lower extremity edema, right lateral lower leg ulcer  Neurological: negative   Behavioral/Psych: negative   Endocrine: negative   Allergic/Immunologic: negative      Physical Exam:    Visit Vitals    /80 (BP 1 Location: Left arm, BP Patient Position: Sitting)    Pulse 84    Resp 20    Ht 5' (1.524 m)    Wt 224 lb (101.6 kg)    BMI 43.75 kg/m2      General: Well-appearing female in no acute distress   HEENT: EOMI, no scleral icterus is noted. Cardiovascular: RRR   Pulmonary: No increased work or breathing is noted. Abdomen: Obese, nondistended. Extremities: Warm and well perfused bilaterally. Pt has significant 4+ pitting edema in the bilateral lower extremities, R>L. She has palpable DP pulses bilaterally with multiphasic Doppler signals. There is a large ulceration to her lateral right lower leg which is draining purulent drainage with foul odor. There is significant surrounding induration and erythema. I do not appreciate any underlying fluctuance. Neuro: Cranial nerves II through XII are grossly intact       Impression and Plan:  Erica Mckeon is a 79 y.o. female with worsening bilateral lower extremity edema and right leg cellulitis and ulceration. Patient did have ultrasounds in the office today which were negative for DVT. She was provided wound care in the office today and placed in Tubigrip for compression. Home health care will be set up for continued wound care and she will be placed in Unna boots for compression as well. I did also advise her to elevate her legs as often as able. I did encourage her to complete her antibiotic regimen as prescribed. Once her ulcer has healed we can consider ordering venous reflux studies at that time if necessary. Patient likely has some underlying lymphedema as well due to her morbid obesity. She will follow up in the office in 2-3 weeks to reassess. Patient is understanding to call the office sooner as needed. Plan was discussed. Patient expresses understanding and agrees. Jessica Samaniego, [address]        PLEASE NOTE:  This document has been produced using voice recognition software. Unrecognized errors in transcription may be present.

## 2018-03-14 NOTE — PROGRESS NOTES
1. Have you been to an emergency room or urgent care clinic since your last visit? NO    Hospitalized since your last visit? No    2. Have you seen or consulted any other health care providers outside of the [facility] since your last visit including any procedures, health maintenance items.  NO

## 2018-03-14 NOTE — MR AVS SNAPSHOT
[address] [facility] 
[phone] Patient: Kimberly Machado MRN: [id number] Visit Information Date & Time Provider Department Dept. Phone Encounter #  
 3/14/2018  1:00 PM [facility] [phone] Your Appointments 3/28/2018 10:45 AM  
Follow Up with [facility] ([address] [place] [address]) Appt Note: 2 weeks fup for wound check [address] [place] [address] [place] [address] [facility]  
[phone] [address] [facility], [place] [address] [facility] 4/17/2018  9:45 AM  
Follow Up with Festus Acosta NP [address] [place] (--) Appt Note: 3 month fu  
 [place] [address] [address] [address] [address] [phone]  
  
   
 [place] 57 [address] [address] [address] Upcoming Health Maintenance Date Due DTaP/Tdap/Td series (1 - Tdap) 1/17/1973 BREAST CANCER SCRN MAMMOGRAM 4/25/2016 GLAUCOMA SCREENING Q2Y 1/17/2017 Bone Densitometry (Dexa) Screening 1/17/2017 MEDICARE YEARLY EXAM 11/22/2017 Pneumococcal 65+ Low/Medium Risk (2 of 2 - PPSV23) 2/22/2018 COLONOSCOPY 8/25/2025 Allergies as of 3/14/2018  Review Complete On: 3/14/2018 By: Ivanna Barnes No Known Allergies Current Immunizations  Reviewed on 11/21/2016 Name Date Influenza High Dose Vaccine PF 11/27/2017 Influenza Vaccine (Quad) PF 9/28/2016 Influenza Vaccine PF 8/26/2014 Pneumococcal Conjugate (PCV-13) 2/22/2017 Zoster Vaccine, Live 12/3/2014 Not reviewed this visit You Were Diagnosed With   
  
 Codes Comments Cellulitis of right lower extremity    -  Primary ICD-10-CM: T80.793 ICD-9-CM: 682.6 Leg swelling     ICD-10-CM: M79.89 ICD-9-CM: 729.81 Vitals BP Pulse Resp Height(growth percentile) Weight(growth percentile) BMI 124/80 (BP 1 Location: Left arm, BP Patient Position: Sitting) 84 20 5' (1.524 m) 224 lb (101.6 kg) 43.75 kg/m2 OB Status Smoking Status Postmenopausal Never Smoker Vitals History BMI and BSA Data Body Mass Index Body Surface Area 43.75 kg/m 2 2.07 m 2 Preferred Pharmacy Pharmacy Name Phone [facility] [address], [address] Your Updated Medication List  
  
   
This list is accurate as of 3/14/18  3:17 PM.  Always use your most recent med list.  
  
  
  
  
 aspirin delayed-release 81 mg tablet Take  by mouth daily. doxycycline 100 mg tablet Commonly known as:  ADOXA Take 1 Tab by mouth two (2) times a day for 10 days. furosemide 40 mg tablet Commonly known as:  LASIX Take 1 Tab by mouth daily. lisinopril 20 mg tablet Commonly known as:  Carmen Ramirez Take 1 Tab by mouth daily. potassium chloride SR 20 mEq tablet Commonly known as:  K-TAB Take 1 Tab by mouth daily. TYLENOL ARTHRITIS 650 mg Laurell Erm Generic drug:  acetaminophen Take 650 mg by mouth every six (6) hours as needed for Pain. To-Do List   
 03/14/2018 Imaging:  DUPLEX LOWER EXT VENOUS BILAT [facility] & HEALTH SERVICES! [employer] introduces [employer] patient portal. Now you can access parts of your medical record, email your doctor's office, and request medication refills online. 1. In your internet browser, go to [email or web address] 2. Click on the First Time User? Click Here link in the Sign In box. You will see the New Member Sign Up page. 3. Enter your [employer] Access Code exactly as it appears below. You will not need to use this code after youve completed the sign-up process. If you do not sign up before the expiration date, you must request a new code. · [email or web address] Access Code: [email or web address] Expires: 6/4/2018 10:43 AM 
 
 4. Enter the last four digits of your Social Security Number (xxxx) and Date of Birth (mm/dd/yyyy) as indicated and click Submit. You will be taken to the next sign-up page. 5. Create a [employer] ID. This will be your [employer] login ID and cannot be changed, so think of one that is secure and easy to remember. 6. Create a [employer] password. You can change your password at any time. 7. Enter your Password Reset Question and Answer. This can be used at a later time if you forget your password. 8. Enter your e-mail address. You will receive e-mail notification when new information is available in [address] 9. Click Sign Up. You can now view and download portions of your medical record. 10. Click the Download Summary menu link to download a portable copy of your medical information. If you have questions, please visit the Frequently Asked Questions section of the [employer] website. Remember, [employer] is NOT to be used for urgent needs. For medical emergencies, dial 911. Now available from your iPhone and Android! Please provide this summary of care documentation to your next provider. Your primary care clinician is listed as RIN PINEDO. If you have any questions after today's visit, please call [phone].

## 2018-03-14 NOTE — PROCEDURES
[facility]  *** FINAL REPORT ***    Name: Merline Au  MRN: [id number]       Outpatient  : 1950  HIS Order #: [id number]  [address] [facility] Visit #: [id number]  Date: 14 Mar 2018    TYPE OF TEST: Peripheral Venous Testing    REASON FOR TEST  Extremity ulceration, Edema    Right Leg:-  Deep venous thrombosis:           No  Superficial venous thrombosis:    No  Deep venous insufficiency:        Not examined  Superficial venous insufficiency: Not examined    Left Leg:-  Deep venous thrombosis:           No  Superficial venous thrombosis:    No  Deep venous insufficiency:        Not examined  Superficial venous insufficiency: Not examined      INTERPRETATION/FINDINGS  Duplex images were obtained using 2-D gray scale, color flow and  spectral doppler analysis. Right leg :  1. No evidence of deep venous thrombosis detected in the common  femoral, femoral, profunda, popliteal or posterior tibial veins  visualized. Peroneal veins not visualized, cannot exclude thrombus in  these veins. 2. No evidence of superficial thrombosis detected in the sapheno  femoral junction. 3. Biphasic tibial artery doppler signal at rest.  Left leg :  1. No evidence of deep venous thrombosis detected in the common  femoral, femoral, profunda, popliteal and posterior tibial veins. Peroneal veins not visualized, cannot exclude thrombus in these veins. 2. No evidence of superficial thrombosis detected in the sapheno  femoral junction. 3. Biphasic tibial artery doppler signal at rest.  Incidental finding:  Non vascular fluid collection in the left calf,  below knee, measuring 4.3 cm in length and 0.9cm AP. ADDITIONAL COMMENTS    I have personally reviewed the data relevant to the interpretation of  this  study. TECHNOLOGIST: Sveta Duncan RDMS  Signed: 2018 04:10 PM    PHYSICIAN: Raleigh Haas D.O.   Signed: 2018 08:50 AM

## 2018-03-14 NOTE — PROGRESS NOTES
Cleansed wound to right lateral lower leg with wound cleanser and gauze, wound has slough with small amount purulent drainage, surrounding tissue WNL. Applied medi honey and border gauze and applied tubi  size G to both lower extremities, patient tolerated well. Will set patient up with home health for further wound care. Wound measures: 4.0x4.0x0.1cm.

## 2018-03-16 ENCOUNTER — HOME CARE VISIT (OUTPATIENT)
Dept: SCHEDULING | Facility: HOME HEALTH | Age: 68
End: 2018-03-16
Payer: MEDICARE

## 2018-03-16 ENCOUNTER — HOME CARE VISIT (OUTPATIENT)
Dept: HOME HEALTH SERVICES | Facility: HOME HEALTH | Age: 68
End: 2018-03-16

## 2018-03-16 VITALS
HEIGHT: 60 IN | OXYGEN SATURATION: 98 % | DIASTOLIC BLOOD PRESSURE: 84 MMHG | BODY MASS INDEX: 43.98 KG/M2 | RESPIRATION RATE: 20 BRPM | TEMPERATURE: 97.6 F | HEART RATE: 73 BPM | WEIGHT: 224 LBS | SYSTOLIC BLOOD PRESSURE: 114 MMHG

## 2018-03-16 PROCEDURE — A6197 ALGINATE DRSG >16 <=48 SQ IN: HCPCS

## 2018-03-16 PROCEDURE — A6446 CONFORM BAND S W>=3" <5"/YD: HCPCS

## 2018-03-16 PROCEDURE — 400013 HH SOC

## 2018-03-16 PROCEDURE — 3331090001 HH PPS REVENUE CREDIT

## 2018-03-16 PROCEDURE — A6456 ZINC PASTE BAND W >=3"<5"/YD: HCPCS

## 2018-03-16 PROCEDURE — 3331090002 HH PPS REVENUE DEBIT

## 2018-03-16 PROCEDURE — G0299 HHS/HOSPICE OF RN EA 15 MIN: HCPCS

## 2018-03-17 PROCEDURE — 3331090002 HH PPS REVENUE DEBIT

## 2018-03-17 PROCEDURE — 3331090001 HH PPS REVENUE CREDIT

## 2018-03-18 PROCEDURE — 3331090002 HH PPS REVENUE DEBIT

## 2018-03-18 PROCEDURE — 3331090001 HH PPS REVENUE CREDIT

## 2018-03-19 PROCEDURE — 3331090001 HH PPS REVENUE CREDIT

## 2018-03-19 PROCEDURE — 3331090002 HH PPS REVENUE DEBIT

## 2018-03-20 ENCOUNTER — HOME CARE VISIT (OUTPATIENT)
Dept: SCHEDULING | Facility: HOME HEALTH | Age: 68
End: 2018-03-20
Payer: MEDICARE

## 2018-03-20 VITALS
RESPIRATION RATE: 16 BRPM | DIASTOLIC BLOOD PRESSURE: 78 MMHG | SYSTOLIC BLOOD PRESSURE: 146 MMHG | HEART RATE: 68 BPM | OXYGEN SATURATION: 97 % | TEMPERATURE: 98.6 F

## 2018-03-20 PROCEDURE — G0299 HHS/HOSPICE OF RN EA 15 MIN: HCPCS

## 2018-03-20 PROCEDURE — 3331090001 HH PPS REVENUE CREDIT

## 2018-03-20 PROCEDURE — 3331090002 HH PPS REVENUE DEBIT

## 2018-03-21 PROCEDURE — 3331090001 HH PPS REVENUE CREDIT

## 2018-03-21 PROCEDURE — A6252 ABSORPT DRG >16 <=48 W/O BDR: HCPCS

## 2018-03-21 PROCEDURE — A6260 WOUND CLEANSER ANY TYPE/SIZE: HCPCS

## 2018-03-21 PROCEDURE — 3331090002 HH PPS REVENUE DEBIT

## 2018-03-21 PROCEDURE — A6454 SELF-ADHER BAND W>=3" <5"/YD: HCPCS

## 2018-03-22 PROCEDURE — 3331090001 HH PPS REVENUE CREDIT

## 2018-03-22 PROCEDURE — 3331090002 HH PPS REVENUE DEBIT

## 2018-03-23 ENCOUNTER — HOME CARE VISIT (OUTPATIENT)
Dept: SCHEDULING | Facility: HOME HEALTH | Age: 68
End: 2018-03-23
Payer: MEDICARE

## 2018-03-23 VITALS
HEART RATE: 88 BPM | SYSTOLIC BLOOD PRESSURE: 132 MMHG | TEMPERATURE: 98.4 F | OXYGEN SATURATION: 97 % | RESPIRATION RATE: 15 BRPM | DIASTOLIC BLOOD PRESSURE: 80 MMHG

## 2018-03-23 PROCEDURE — 3331090002 HH PPS REVENUE DEBIT

## 2018-03-23 PROCEDURE — 3331090001 HH PPS REVENUE CREDIT

## 2018-03-23 PROCEDURE — G0299 HHS/HOSPICE OF RN EA 15 MIN: HCPCS

## 2018-03-24 PROCEDURE — 3331090002 HH PPS REVENUE DEBIT

## 2018-03-24 PROCEDURE — 3331090001 HH PPS REVENUE CREDIT

## 2018-03-25 ENCOUNTER — HOME CARE VISIT (OUTPATIENT)
Dept: HOME HEALTH SERVICES | Facility: HOME HEALTH | Age: 68
End: 2018-03-25
Payer: MEDICARE

## 2018-03-25 PROCEDURE — 3331090002 HH PPS REVENUE DEBIT

## 2018-03-25 PROCEDURE — 3331090001 HH PPS REVENUE CREDIT

## 2018-03-26 DIAGNOSIS — I10 ESSENTIAL HYPERTENSION: ICD-10-CM

## 2018-03-26 PROCEDURE — 3331090002 HH PPS REVENUE DEBIT

## 2018-03-26 PROCEDURE — 3331090001 HH PPS REVENUE CREDIT

## 2018-03-26 RX ORDER — LISINOPRIL 20 MG/1
20 TABLET ORAL DAILY
Qty: 90 TAB | Refills: 1 | Status: SHIPPED | OUTPATIENT
Start: 2018-03-26 | End: 2018-04-17 | Stop reason: SDUPTHER

## 2018-03-27 ENCOUNTER — HOME CARE VISIT (OUTPATIENT)
Dept: HOME HEALTH SERVICES | Facility: HOME HEALTH | Age: 68
End: 2018-03-27
Payer: MEDICARE

## 2018-03-27 PROCEDURE — 3331090002 HH PPS REVENUE DEBIT

## 2018-03-27 PROCEDURE — 3331090001 HH PPS REVENUE CREDIT

## 2018-03-28 ENCOUNTER — HOME CARE VISIT (OUTPATIENT)
Dept: HOME HEALTH SERVICES | Facility: HOME HEALTH | Age: 68
End: 2018-03-28
Payer: MEDICARE

## 2018-03-28 PROCEDURE — 3331090001 HH PPS REVENUE CREDIT

## 2018-03-28 PROCEDURE — 3331090002 HH PPS REVENUE DEBIT

## 2018-03-29 PROCEDURE — 3331090001 HH PPS REVENUE CREDIT

## 2018-03-29 PROCEDURE — 3331090002 HH PPS REVENUE DEBIT

## 2018-03-30 PROCEDURE — 3331090001 HH PPS REVENUE CREDIT

## 2018-03-30 PROCEDURE — 3331090002 HH PPS REVENUE DEBIT

## 2018-03-31 PROCEDURE — 3331090002 HH PPS REVENUE DEBIT

## 2018-03-31 PROCEDURE — 3331090001 HH PPS REVENUE CREDIT

## 2018-04-01 PROCEDURE — 3331090001 HH PPS REVENUE CREDIT

## 2018-04-01 PROCEDURE — 3331090002 HH PPS REVENUE DEBIT

## 2018-04-02 PROCEDURE — 3331090001 HH PPS REVENUE CREDIT

## 2018-04-02 PROCEDURE — 3331090002 HH PPS REVENUE DEBIT

## 2018-04-03 PROCEDURE — 3331090001 HH PPS REVENUE CREDIT

## 2018-04-03 PROCEDURE — 3331090002 HH PPS REVENUE DEBIT

## 2018-04-04 DIAGNOSIS — L97.909 ULCER OF LOWER EXTREMITY, UNSPECIFIED LATERALITY, UNSPECIFIED ULCER STAGE (HCC): Primary | ICD-10-CM

## 2018-04-04 PROCEDURE — 3331090002 HH PPS REVENUE DEBIT

## 2018-04-04 PROCEDURE — 3331090001 HH PPS REVENUE CREDIT

## 2018-04-05 PROCEDURE — 3331090002 HH PPS REVENUE DEBIT

## 2018-04-05 PROCEDURE — 3331090001 HH PPS REVENUE CREDIT

## 2018-04-05 NOTE — PROGRESS NOTES
Please contact patient and ask that she comes back in for a follow up appointment so we can discuss her labs further.  Flaco Foss

## 2018-04-06 PROCEDURE — 3331090002 HH PPS REVENUE DEBIT

## 2018-04-06 PROCEDURE — 3331090001 HH PPS REVENUE CREDIT

## 2018-04-07 ENCOUNTER — HOME CARE VISIT (OUTPATIENT)
Dept: SCHEDULING | Facility: HOME HEALTH | Age: 68
End: 2018-04-07
Payer: MEDICARE

## 2018-04-07 PROCEDURE — 3331090002 HH PPS REVENUE DEBIT

## 2018-04-07 PROCEDURE — G0299 HHS/HOSPICE OF RN EA 15 MIN: HCPCS

## 2018-04-07 PROCEDURE — 3331090001 HH PPS REVENUE CREDIT

## 2018-04-08 PROCEDURE — 3331090002 HH PPS REVENUE DEBIT

## 2018-04-08 PROCEDURE — 3331090001 HH PPS REVENUE CREDIT

## 2018-04-09 ENCOUNTER — HOME CARE VISIT (OUTPATIENT)
Dept: HOME HEALTH SERVICES | Facility: HOME HEALTH | Age: 68
End: 2018-04-09
Payer: MEDICARE

## 2018-04-09 PROCEDURE — 3331090002 HH PPS REVENUE DEBIT

## 2018-04-09 PROCEDURE — 3331090001 HH PPS REVENUE CREDIT

## 2018-04-10 PROCEDURE — 3331090001 HH PPS REVENUE CREDIT

## 2018-04-10 PROCEDURE — 3331090002 HH PPS REVENUE DEBIT

## 2018-04-11 ENCOUNTER — HOME CARE VISIT (OUTPATIENT)
Dept: SCHEDULING | Facility: HOME HEALTH | Age: 68
End: 2018-04-11
Payer: MEDICARE

## 2018-04-11 VITALS
TEMPERATURE: 96.7 F | HEART RATE: 84 BPM | SYSTOLIC BLOOD PRESSURE: 124 MMHG | OXYGEN SATURATION: 97 % | RESPIRATION RATE: 18 BRPM | DIASTOLIC BLOOD PRESSURE: 90 MMHG

## 2018-04-11 VITALS
TEMPERATURE: 98.4 F | RESPIRATION RATE: 16 BRPM | HEART RATE: 77 BPM | SYSTOLIC BLOOD PRESSURE: 112 MMHG | OXYGEN SATURATION: 98 % | DIASTOLIC BLOOD PRESSURE: 78 MMHG

## 2018-04-11 PROCEDURE — G0299 HHS/HOSPICE OF RN EA 15 MIN: HCPCS

## 2018-04-11 PROCEDURE — 3331090002 HH PPS REVENUE DEBIT

## 2018-04-11 PROCEDURE — 3331090001 HH PPS REVENUE CREDIT

## 2018-04-12 PROCEDURE — 3331090001 HH PPS REVENUE CREDIT

## 2018-04-12 PROCEDURE — 3331090002 HH PPS REVENUE DEBIT

## 2018-04-13 ENCOUNTER — HOME CARE VISIT (OUTPATIENT)
Dept: SCHEDULING | Facility: HOME HEALTH | Age: 68
End: 2018-04-13
Payer: MEDICARE

## 2018-04-13 PROCEDURE — 3331090002 HH PPS REVENUE DEBIT

## 2018-04-13 PROCEDURE — 3331090001 HH PPS REVENUE CREDIT

## 2018-04-13 PROCEDURE — G0299 HHS/HOSPICE OF RN EA 15 MIN: HCPCS

## 2018-04-14 VITALS
OXYGEN SATURATION: 97 % | DIASTOLIC BLOOD PRESSURE: 94 MMHG | HEART RATE: 74 BPM | SYSTOLIC BLOOD PRESSURE: 151 MMHG | TEMPERATURE: 98 F | RESPIRATION RATE: 18 BRPM

## 2018-04-14 PROCEDURE — 3331090001 HH PPS REVENUE CREDIT

## 2018-04-14 PROCEDURE — 3331090002 HH PPS REVENUE DEBIT

## 2018-04-15 PROCEDURE — 3331090001 HH PPS REVENUE CREDIT

## 2018-04-15 PROCEDURE — 3331090002 HH PPS REVENUE DEBIT

## 2018-04-16 ENCOUNTER — HOME CARE VISIT (OUTPATIENT)
Dept: SCHEDULING | Facility: HOME HEALTH | Age: 68
End: 2018-04-16
Payer: MEDICARE

## 2018-04-16 VITALS
RESPIRATION RATE: 16 BRPM | DIASTOLIC BLOOD PRESSURE: 92 MMHG | OXYGEN SATURATION: 98 % | TEMPERATURE: 97.7 F | HEART RATE: 65 BPM | SYSTOLIC BLOOD PRESSURE: 141 MMHG

## 2018-04-16 PROCEDURE — G0299 HHS/HOSPICE OF RN EA 15 MIN: HCPCS

## 2018-04-16 PROCEDURE — 3331090002 HH PPS REVENUE DEBIT

## 2018-04-16 PROCEDURE — 3331090001 HH PPS REVENUE CREDIT

## 2018-04-17 ENCOUNTER — OFFICE VISIT (OUTPATIENT)
Dept: FAMILY MEDICINE CLINIC | Age: 68
End: 2018-04-17

## 2018-04-17 ENCOUNTER — HOME CARE VISIT (OUTPATIENT)
Dept: HOME HEALTH SERVICES | Facility: HOME HEALTH | Age: 68
End: 2018-04-17
Payer: MEDICARE

## 2018-04-17 VITALS
SYSTOLIC BLOOD PRESSURE: 144 MMHG | RESPIRATION RATE: 16 BRPM | HEIGHT: 60 IN | DIASTOLIC BLOOD PRESSURE: 89 MMHG | TEMPERATURE: 97 F | WEIGHT: 216 LBS | HEART RATE: 74 BPM | BODY MASS INDEX: 42.41 KG/M2 | OXYGEN SATURATION: 98 %

## 2018-04-17 DIAGNOSIS — F25.0 SCHIZOAFFECTIVE DISORDER, BIPOLAR TYPE (HCC): ICD-10-CM

## 2018-04-17 DIAGNOSIS — L97.919 ULCER OF RIGHT LOWER EXTREMITY, UNSPECIFIED ULCER STAGE (HCC): ICD-10-CM

## 2018-04-17 DIAGNOSIS — R60.0 BILATERAL LEG EDEMA: ICD-10-CM

## 2018-04-17 DIAGNOSIS — I10 ESSENTIAL HYPERTENSION: Primary | ICD-10-CM

## 2018-04-17 DIAGNOSIS — R73.03 PREDIABETES: ICD-10-CM

## 2018-04-17 PROCEDURE — A6454 SELF-ADHER BAND W>=3" <5"/YD: HCPCS

## 2018-04-17 PROCEDURE — 3331090001 HH PPS REVENUE CREDIT

## 2018-04-17 PROCEDURE — 3331090002 HH PPS REVENUE DEBIT

## 2018-04-17 RX ORDER — LISINOPRIL 20 MG/1
20 TABLET ORAL DAILY
Qty: 90 TAB | Refills: 1 | Status: SHIPPED | OUTPATIENT
Start: 2018-04-17 | End: 2019-10-29 | Stop reason: SDUPTHER

## 2018-04-17 RX ORDER — FUROSEMIDE 40 MG/1
40 TABLET ORAL DAILY
Qty: 30 TAB | Refills: 3 | Status: SHIPPED | OUTPATIENT
Start: 2018-04-17 | End: 2019-03-13 | Stop reason: SDUPTHER

## 2018-04-17 NOTE — PROGRESS NOTES
HPI  Sia Walls is a 79 y.o. female  Chief Complaint   Patient presents with    Follow-up     Patient has had labs drawn and was told to come into to get lab results. Patient had an ulcer on right leg at last visit and has home health nurse that is taking care of the wound and is healing. Reports she is getting her leg wrapped and dressed by home health twice a week. Reports she sees the wound clinic again on May the 9th. Denies fevers or chills. Reports she has completed all antibiotics. Reports leg wound is healing. Bipolar - reports she is not currently taking any medication. Denies hallucinations or suicidal ideations. Reports she is going to a mental health counselor in [place]. Reports she is feeling well. Hypertension- Requesting a refill on her blood pressure medication. Denies having chest pain, shortness of breath, dizziness, or blurred vision. Leg swelling - admits that ankles and legs swell sometime. Reports she does take her medication. Past Medical History  Past Medical History:   Diagnosis Date    Asthma     Hypertension     Latent syphilis 3/22/2017    Osteoarthritis of ankle     Right ankle pain     posterior tibial tendon interstitial tear    Right foot pain     hindfoot arthrosis    Sleep disorder        Surgical History  Past Surgical History:   Procedure Laterality Date    HX  SECTION          Medications  Current Outpatient Prescriptions   Medication Sig Dispense Refill    lisinopril (PRINIVIL, ZESTRIL) 20 mg tablet Take 1 Tab by mouth daily. 90 Tab 1    furosemide (LASIX) 40 mg tablet Take 1 Tab by mouth daily. 30 Tab 3    amoxicillin (AMOXIL) 500 mg capsule Take 500 mg by mouth two (2) times a day.  potassium chloride SR (K-TAB) 20 mEq tablet Take 1 Tab by mouth daily. 30 Tab 3    aspirin delayed-release 81 mg tablet Take  by mouth daily.       acetaminophen (TYLENOL ARTHRITIS) 650 mg CR tablet Take 650 mg by mouth every six (6) hours as needed for Pain. Allergies  No Known Allergies    Family History  Family History   Problem Relation Age of Onset    Diabetes Mother     Hypertension Mother     Heart Disease Father     Hypertension Father     Gout Father     Heart Attack Brother     Schizophrenia Brother        Social History  Social History     Social History    Marital status:      Spouse name: N/A    Number of children: N/A    Years of education: N/A     Occupational History    Not on file. Social History Main Topics    Smoking status: Never Smoker    Smokeless tobacco: Never Used    Alcohol use No    Drug use: No    Sexual activity: No     Other Topics Concern    Not on file     Social History Narrative       Problem List  Patient Active Problem List   Diagnosis Code    HTN (hypertension) I10    Hyperlipidemia E78.5    Family history of heart attack, premature, brother 46s Z80.55    Prediabetes R78.1    Family history of diabetes mellitus type II, mother Z80.1    Vitamin D deficiency E55.9    ACP (advance care planning) Z71.89    Latent syphilis A53.0    Obesity, morbid ([facility] [address] E66.01       Review of Systems  Review of Systems   Constitutional: Negative for chills and fever. Respiratory: Negative for shortness of breath. Cardiovascular: Positive for leg swelling. Negative for chest pain and palpitations. Gastrointestinal: Negative for blood in stool, nausea and vomiting. Genitourinary: Negative for dysuria and hematuria. Psychiatric/Behavioral: Negative for hallucinations, substance abuse and suicidal ideas. Vital Signs  Vitals:    04/17/18 0936   BP: 144/89   Pulse: 74   Resp: 16   Temp: 97 °F (36.1 °C)   TempSrc: Oral   SpO2: 98%   Weight: 216 lb (98 kg)   Height: 5' (1.524 m)   PainSc:   0 - No pain       Physical Exam  Physical Exam   Constitutional: She is oriented to person, place, and time.    HENT:   Mouth/Throat: Oropharynx is clear and moist.   Eyes: Pupils are equal, round, and reactive to light. Cardiovascular: Normal rate, regular rhythm and normal heart sounds. Pulmonary/Chest: Breath sounds normal. No respiratory distress. She has no wheezes. Abdominal: Soft. Bowel sounds are normal. She exhibits no distension. Neurological: She is alert and oriented to person, place, and time. Coordination normal.   Skin:   Dressing to right lower extremity intact. Psychiatric: She has a normal mood and affect. Her speech is normal and behavior is normal. Thought content normal. Thought content is not delusional. Cognition and memory are normal. She expresses no homicidal and no suicidal ideation. Strong body odor. Patient is well dressed. Alert and oriented x3. Knows the president and current events. Vitals reviewed. Diagnostics  No orders of the defined types were placed in this encounter.       Results  Results for orders placed or performed during the hospital encounter of 03/06/18   CULTURE, WOUND W GRAM STAIN   Result Value Ref Range    Special Requests: NO SPECIAL REQUESTS      GRAM STAIN MANY WBC'S      GRAM STAIN MANY GRAM POSITIVE COCCI IN PAIRS      GRAM STAIN FEW GRAM POSITIVE COCCI IN GROUPS      GRAM STAIN RARE GRAM POSITIVE RODS      GRAM STAIN MODERATE GRAM NEGATIVE RODS      Culture result: RARE STAPHYLOCOCCUS SIMULANS (A)      Culture result: MODERATE DIPHTHEROIDS (TWO MORPHOTYPES) (A)      Culture result: MODERATE STREPTOCOCCUS VIRIDANS (A)      Culture result: (A)       ANAEROBIC GRAM NEGATIVE RODS ISOLATED FROM BROTH ONLY UNABLE TO CULTIVATE FOR IDENTIFICATION       Susceptibility    Staphylococcus simulans - LEDY     Ampicillin/sulbactam ($) DEDUCED SENSITIVE Susceptible ug/mL     Penicillin G ($$) <=0.03 Resistant ug/mL     Cefazolin ($) DEDUCED SENSITIVE Susceptible ug/mL     Clindamycin ($) <=0.25 Resistant ug/mL     Erythromycin ($$$$) 1 Resistant ug/mL     Gentamicin ($) <=0.5 Susceptible ug/mL     Levofloxacin ($) <=0.12 Susceptible ug/mL Oxacillin <=0.25 Susceptible ug/mL     Rifampin ($$$$)* <=0.5 Susceptible ug/mL      * Rifampin is not to be used for mono-therapy. Tetracycline <=1 Susceptible ug/mL     Vancomycin ($) <=0.5 Susceptible ug/mL     Trimeth-Sulfamethoxa <=10 Susceptible ug/mL     Tigecycline ($$$$) <=0.12 Susceptible ug/mL   CBC WITH AUTOMATED DIFF   Result Value Ref Range    WBC 4.5 (L) 4.6 - 13.2 K/uL    RBC 5.00 4.20 - 5.30 M/uL    HGB 11.3 (L) 12.0 - 16.0 g/dL    HCT 37.7 35.0 - 45.0 %    MCV 75.4 74.0 - 97.0 FL    MCH 22.6 (L) 24.0 - 34.0 PG    MCHC 30.0 (L) 31.0 - 37.0 g/dL    RDW 17.0 (H) 11.6 - 14.5 %    PLATELET 593 480 - 616 K/uL    MPV 11.1 9.2 - 11.8 FL    NEUTROPHILS 60 40 - 73 %    LYMPHOCYTES 28 21 - 52 %    MONOCYTES 9 3 - 10 %    EOSINOPHILS 3 0 - 5 %    BASOPHILS 0 0 - 2 %    ABS. NEUTROPHILS 2.7 1.8 - 8.0 K/UL    ABS. LYMPHOCYTES 1.3 0.9 - 3.6 K/UL    ABS. MONOCYTES 0.4 0.05 - 1.2 K/UL    ABS. EOSINOPHILS 0.1 0.0 - 0.4 K/UL    ABS. BASOPHILS 0.0 0.0 - 0.06 K/UL    DF AUTOMATED     METABOLIC PANEL, COMPREHENSIVE   Result Value Ref Range    Sodium 146 (H) 136 - 145 mmol/L    Potassium 3.7 3.5 - 5.5 mmol/L    Chloride 112 (H) 100 - 108 mmol/L    CO2 26 21 - 32 mmol/L    Anion gap 8 3.0 - 18 mmol/L    Glucose 95 74 - 99 mg/dL    BUN 17 7.0 - 18 MG/DL    Creatinine 1.03 0.6 - 1.3 MG/DL    BUN/Creatinine ratio 17 12 - 20      GFR est AA >60 >60 ml/min/1.73m2    GFR est non-AA 54 (L) >60 ml/min/1.73m2    Calcium 8.4 (L) 8.5 - 10.1 MG/DL    Bilirubin, total 0.4 0.2 - 1.0 MG/DL    ALT (SGPT) 19 13 - 56 U/L    AST (SGOT) 14 (L) 15 - 37 U/L    Alk. phosphatase 111 45 - 117 U/L    Protein, total 7.3 6.4 - 8.2 g/dL    Albumin 3.4 3.4 - 5.0 g/dL    Globulin 3.9 2.0 - 4.0 g/dL    A-G Ratio 0.9 0.8 - 1.7     HEMOGLOBIN A1C WITH EAG   Result Value Ref Range    Hemoglobin A1c 6.0 (H) 4.2 - 5.6 %    Est. average glucose 126 mg/dL         Assessment and Plan  Diagnoses and all orders for this visit:    1.  Essential hypertension  - lisinopril (PRINIVIL, ZESTRIL) 20 mg tablet; Take 1 Tab by mouth daily. 2. Bilateral leg edema  -     furosemide (LASIX) 40 mg tablet; Take 1 Tab by mouth daily. 3. Ulcer of right lower extremity, unspecified ulcer stage ([facility] [address]    4. Schizoaffective disorder, bipolar type ([facility] [address]    5. Prediabetes      Lab discussed with patient. Discussed diet. Encouraged patient to continue follow up with mental health counselor. After care summary printed and reviewed with patient. Plan reviewed with patient. Questions answered. Patient verbalized understanding of plan and is in agreement with plan. Patient to follow up in two months or earlier if symptoms worsen. Well woman at next visit.     KWAME MastersC

## 2018-04-17 NOTE — MR AVS SNAPSHOT
[address] [facility] 
 
 
 [address] [address] [address] [address] [phone] Patient: Zhane Dasilva MRN: [id number] Visit Information Date & Time Provider Department Dept. Phone Encounter #  
 4/17/2018  9:45 AM Lili Rosales NP Carry Herman [address] [id number] Follow-up Instructions Return in about 2 months (around 6/17/2018), or if symptoms worsen or fail to improve, for well woman. Your Appointments 5/9/2018 10:30 AM  
Follow Up with [facility] ([address]) Appt Note: 2 weeks fup for wound check; per call center pt wants to reschedule, i tried to call the pt to reschedule no answer or option to leave a message, do not adriel as a no show if she does not come; cld pt to confirm that they wanted to reschedule, per pt's mother she is in the hospital in [place] and they will call to reschedule; .  
 [address] [address] [address] [facility]  
[phone] [address] [place] [address] [address] [facility] [address] Upcoming Health Maintenance Date Due DTaP/Tdap/Td series (1 - Tdap) 1/17/1973 BREAST CANCER SCRN MAMMOGRAM 4/25/2016 GLAUCOMA SCREENING Q2Y 1/17/2017 Bone Densitometry (Dexa) Screening 1/17/2017 Pneumococcal 65+ Low/Medium Risk (2 of 2 - PPSV23) 2/22/2018 MEDICARE YEARLY EXAM 3/14/2018 COLONOSCOPY 8/25/2025 Allergies as of 4/17/2018  Review Complete On: 4/17/2018 By: Lili Roslaes NP No Known Allergies Current Immunizations  Reviewed on 11/21/2016 Name Date Influenza High Dose Vaccine PF 11/27/2017 Influenza Vaccine (Quad) PF 9/28/2016 Influenza Vaccine PF 8/26/2014 Pneumococcal Conjugate (PCV-13) 2/22/2017 Zoster Vaccine, Live 12/3/2014 Not reviewed this visit You Were Diagnosed With   
  
 Codes Comments  Essential hypertension    -  Primary ICD-10-CM: I10 
 ICD-9-CM: 401.9 Bilateral leg edema     ICD-10-CM: R60.0 ICD-9-CM: 412. 3 Ulcer of right lower extremity, unspecified ulcer stage ([facility]     ICD-10-CM: P63.485 ICD-9-CM: 707.10 Schizoaffective disorder, bipolar type ([address]     ICD-10-CM: F25.0 ICD-9-CM: 295.70 Prediabetes     ICD-10-CM: R73.03 
ICD-9-CM: 790.29 Vitals BP Pulse Temp Resp Height(growth percentile) Weight(growth percentile) 144/89 (BP 1 Location: Right arm, BP Patient Position: Sitting) 74 97 °F (36.1 °C) (Oral) 16 5' (1.524 m) 216 lb (98 kg) SpO2 BMI OB Status Smoking Status 98% 42.18 kg/m2 Postmenopausal Never Smoker BMI and BSA Data Body Mass Index Body Surface Area  
 42.18 kg/m 2 2.04 m 2 Preferred Pharmacy Pharmacy Name Phone [facility] [address], [address] [facility] Your Updated Medication List  
  
   
This list is accurate as of 4/17/18 10:26 AM.  Always use your most recent med list.  
  
  
  
  
 amoxicillin 500 mg capsule Commonly known as:  AMOXIL Take 500 mg by mouth two (2) times a day. aspirin delayed-release 81 mg tablet Take  by mouth daily. furosemide 40 mg tablet Commonly known as:  LASIX Take 1 Tab by mouth daily. lisinopril 20 mg tablet Commonly known as:  Emily Flight Take 1 Tab by mouth daily. potassium chloride SR 20 mEq tablet Commonly known as:  K-TAB Take 1 Tab by mouth daily. TYLENOL ARTHRITIS 650 mg Amedeo Kevin Generic drug:  acetaminophen Take 650 mg by mouth every six (6) hours as needed for Pain. Follow-up Instructions Return in about 2 months (around 6/17/2018), or if symptoms worsen or fail to improve, for well woman. To-Do List   
 04/19/2018 To Be Determined Appointment with Juanita Chino RN at [address] [place] [address] Patient Instructions Please contact our office if you have any questions about your visit today. Introducing [facility] & HEALTH SERVICES! [facility] introduces [employer] patient portal. Now you can access parts of your medical record, email your doctor's office, and request medication refills online. 1. In your internet browser, go to [email or web address] 2. Click on the First Time User? Click Here link in the Sign In box. You will see the New Member Sign Up page. 3. Enter your [employer] Access Code exactly as it appears below. You will not need to use this code after youve completed the sign-up process. If you do not sign up before the expiration date, you must request a new code. · [employer] Access Code: [email or web address] Expires: 6/4/2018 10:43 AM 
 
4. Enter the last four digits of your Social Security Number (xxxx) and Date of Birth (mm/dd/yyyy) as indicated and click Submit. You will be taken to the next sign-up page. 5. Create a [employer] ID. This will be your [employer] login ID and cannot be changed, so think of one that is secure and easy to remember. 6. Create a [employer] password. You can change your password at any time. 7. Enter your Password Reset Question and Answer. This can be used at a later time if you forget your password. 8. Enter your e-mail address. You will receive e-mail notification when new information is available in [address] 9. Click Sign Up. You can now view and download portions of your medical record. 10. Click the Download Summary menu link to download a portable copy of your medical information. If you have questions, please visit the Frequently Asked Questions section of the [employer] website. Remember, [employer] is NOT to be used for urgent needs. For medical emergencies, dial 911. Now available from your iPhone and Android! Please provide this summary of care documentation to your next provider. Your primary care clinician is listed as RIN PINEDO. If you have any questions after today's visit, please call [phone].

## 2018-04-18 PROCEDURE — 3331090002 HH PPS REVENUE DEBIT

## 2018-04-18 PROCEDURE — 3331090001 HH PPS REVENUE CREDIT

## 2018-04-19 ENCOUNTER — HOME CARE VISIT (OUTPATIENT)
Dept: SCHEDULING | Facility: HOME HEALTH | Age: 68
End: 2018-04-19
Payer: MEDICARE

## 2018-04-19 VITALS — HEART RATE: 71 BPM | OXYGEN SATURATION: 98 % | TEMPERATURE: 97.6 F | RESPIRATION RATE: 18 BRPM

## 2018-04-19 PROCEDURE — G0299 HHS/HOSPICE OF RN EA 15 MIN: HCPCS

## 2018-04-19 PROCEDURE — A6454 SELF-ADHER BAND W>=3" <5"/YD: HCPCS

## 2018-04-19 PROCEDURE — 3331090002 HH PPS REVENUE DEBIT

## 2018-04-19 PROCEDURE — A6456 ZINC PASTE BAND W >=3"<5"/YD: HCPCS

## 2018-04-19 PROCEDURE — 3331090001 HH PPS REVENUE CREDIT

## 2018-04-20 PROCEDURE — 3331090001 HH PPS REVENUE CREDIT

## 2018-04-20 PROCEDURE — 3331090002 HH PPS REVENUE DEBIT

## 2018-04-21 PROCEDURE — 3331090002 HH PPS REVENUE DEBIT

## 2018-04-21 PROCEDURE — 3331090001 HH PPS REVENUE CREDIT

## 2018-04-22 PROCEDURE — 3331090001 HH PPS REVENUE CREDIT

## 2018-04-22 PROCEDURE — 3331090002 HH PPS REVENUE DEBIT

## 2018-04-23 ENCOUNTER — HOME CARE VISIT (OUTPATIENT)
Dept: SCHEDULING | Facility: HOME HEALTH | Age: 68
End: 2018-04-23
Payer: MEDICARE

## 2018-04-23 VITALS
HEART RATE: 76 BPM | OXYGEN SATURATION: 97 % | DIASTOLIC BLOOD PRESSURE: 88 MMHG | RESPIRATION RATE: 16 BRPM | SYSTOLIC BLOOD PRESSURE: 139 MMHG | TEMPERATURE: 97.8 F

## 2018-04-23 DIAGNOSIS — L97.911 ULCER OF RIGHT LOWER EXTREMITY, LIMITED TO BREAKDOWN OF SKIN (HCC): ICD-10-CM

## 2018-04-23 DIAGNOSIS — M79.89 LEG SWELLING: Primary | ICD-10-CM

## 2018-04-23 PROCEDURE — 3331090002 HH PPS REVENUE DEBIT

## 2018-04-23 PROCEDURE — A6197 ALGINATE DRSG >16 <=48 SQ IN: HCPCS

## 2018-04-23 PROCEDURE — G0299 HHS/HOSPICE OF RN EA 15 MIN: HCPCS

## 2018-04-23 PROCEDURE — 3331090001 HH PPS REVENUE CREDIT

## 2018-04-24 PROCEDURE — 3331090001 HH PPS REVENUE CREDIT

## 2018-04-24 PROCEDURE — 3331090002 HH PPS REVENUE DEBIT

## 2018-04-25 PROCEDURE — 3331090001 HH PPS REVENUE CREDIT

## 2018-04-25 PROCEDURE — 3331090002 HH PPS REVENUE DEBIT

## 2018-04-26 PROCEDURE — 3331090002 HH PPS REVENUE DEBIT

## 2018-04-26 PROCEDURE — 3331090001 HH PPS REVENUE CREDIT

## 2018-04-27 PROCEDURE — 3331090002 HH PPS REVENUE DEBIT

## 2018-04-27 PROCEDURE — 3331090001 HH PPS REVENUE CREDIT

## 2018-04-28 PROCEDURE — 3331090002 HH PPS REVENUE DEBIT

## 2018-04-28 PROCEDURE — 3331090001 HH PPS REVENUE CREDIT

## 2018-04-29 PROCEDURE — 3331090001 HH PPS REVENUE CREDIT

## 2018-04-29 PROCEDURE — 3331090002 HH PPS REVENUE DEBIT

## 2018-05-01 PROCEDURE — A6197 ALGINATE DRSG >16 <=48 SQ IN: HCPCS

## 2018-05-08 ENCOUNTER — DOCUMENTATION ONLY (OUTPATIENT)
Dept: VASCULAR SURGERY | Age: 68
End: 2018-05-08

## 2018-05-14 ENCOUNTER — OFFICE VISIT (OUTPATIENT)
Dept: FAMILY MEDICINE CLINIC | Age: 68
End: 2018-05-14

## 2018-05-14 VITALS
SYSTOLIC BLOOD PRESSURE: 128 MMHG | OXYGEN SATURATION: 96 % | BODY MASS INDEX: 40.84 KG/M2 | WEIGHT: 208 LBS | HEART RATE: 88 BPM | HEIGHT: 60 IN | RESPIRATION RATE: 16 BRPM | DIASTOLIC BLOOD PRESSURE: 72 MMHG | TEMPERATURE: 98.3 F

## 2018-05-14 DIAGNOSIS — J30.89 ENVIRONMENTAL AND SEASONAL ALLERGIES: ICD-10-CM

## 2018-05-14 DIAGNOSIS — J45.41 MODERATE PERSISTENT ASTHMA WITH EXACERBATION: ICD-10-CM

## 2018-05-14 DIAGNOSIS — J06.9 UPPER RESPIRATORY TRACT INFECTION, UNSPECIFIED TYPE: Primary | ICD-10-CM

## 2018-05-14 RX ORDER — ALBUTEROL SULFATE 0.83 MG/ML
2.5 SOLUTION RESPIRATORY (INHALATION) ONCE
Qty: 1 EACH | Refills: 0
Start: 2018-05-14 | End: 2018-05-14

## 2018-05-14 RX ORDER — ALBUTEROL SULFATE 90 UG/1
AEROSOL, METERED RESPIRATORY (INHALATION)
Refills: 0 | COMMUNITY
Start: 2018-05-06

## 2018-05-14 RX ORDER — PREDNISONE 20 MG/1
20 TABLET ORAL DAILY
Qty: 3 TAB | Refills: 0 | Status: SHIPPED | OUTPATIENT
Start: 2018-05-14 | End: 2019-11-06 | Stop reason: ALTCHOICE

## 2018-05-14 RX ORDER — LORATADINE 10 MG
10 TABLET,DISINTEGRATING ORAL DAILY
Qty: 30 TAB | Refills: 0 | Status: SHIPPED | OUTPATIENT
Start: 2018-05-14 | End: 2021-07-13 | Stop reason: ALTCHOICE

## 2018-05-14 RX ORDER — BENZONATATE 100 MG/1
CAPSULE ORAL
Refills: 0 | COMMUNITY
Start: 2018-05-06 | End: 2019-11-06 | Stop reason: ALTCHOICE

## 2018-05-14 NOTE — MR AVS SNAPSHOT
303 [facility] 
 
 
 [address] [id number] [phone] Patient: Sia Walls MRN: [id number] Visit Information Date & Time Provider Department Dept. Phone Encounter #  
 5/14/2018  2:00 PM Ameya García NP [address] [phone] [id number] Your Appointments 6/6/2018 12:45 PM  
PROCEDURE with [facility] 2 [facility] ([address] [place] [address]) Appt Note: mellissa reflux wild same day appt; r/s; R/S; OFFICE RESCHEDULED DUE TO [facility] OUT OF OFFICE; .  
 [address] [facility]  
[phone] [facility] Taffy Castleman [address]  
  
    
 6/6/2018  3:30 PM  
Follow Up with [facility] ([address] [place] [address]) Appt Note: transportation issues; r/s; R/S; OFFICE RESCHEDULED DUE TO [facility] OUT OF OFFICE; .  
 [place] [address] [place] [address] [address] [facility]  
[phone] [address] [place] [address]  
  
    
 6/18/2018 10:00 AM  
Follow Up with Ameya García NP [address] (--) Appt Note: bharath [address]jas [address] [id number] [phone]  
  
   
 [address] [phone] Upcoming Health Maintenance Date Due DTaP/Tdap/Td series (1 - Tdap) 1/17/1973 GLAUCOMA SCREENING Q2Y 1/17/2017 Bone Densitometry (Dexa) Screening 1/17/2017 Pneumococcal 65+ Low/Medium Risk (2 of 2 - PPSV23) 2/22/2018 MEDICARE YEARLY EXAM 3/14/2018 Influenza Age 5 to Adult 8/1/2018 BREAST CANCER SCRN MAMMOGRAM 8/8/2019 COLONOSCOPY 8/25/2025 Allergies as of 5/14/2018  Review Complete On: 5/14/2018 By: Ameya García NP No Known Allergies Current Immunizations  Reviewed on 11/21/2016 Name Date Influenza High Dose Vaccine PF 11/27/2017 Influenza Vaccine (Quad) PF 9/28/2016 Influenza Vaccine PF 8/26/2014 Pneumococcal Conjugate (PCV-13) 2/22/2017 Zoster Vaccine, Live 12/3/2014 Not reviewed this visit You Were Diagnosed With   
  
 Codes Comments Upper respiratory tract infection, unspecified type    -  Primary ICD-10-CM: J06.9 ICD-9-CM: 465.9 Moderate persistent asthma with exacerbation     ICD-10-CM: J45.41 
ICD-9-CM: 493.92 Environmental and seasonal allergies     ICD-10-CM: J30.89 ICD-9-CM: 477.8 Vitals BP Pulse Temp Resp Height(growth percentile) Weight(growth percentile) 128/72 (BP 1 Location: Left arm, BP Patient Position: Sitting) 88 98.3 °F (36.8 °C) (Oral) 16 5' (1.524 m) 208 lb (94.3 kg) SpO2 BMI OB Status Smoking Status 96% 40.62 kg/m2 Postmenopausal Never Smoker Vitals History BMI and BSA Data Body Mass Index Body Surface Area  
 40.62 kg/m 2 2 m 2 Preferred Pharmacy Pharmacy Name Phone [facility] [address] [facility], [address] [facility] Your Updated Medication List  
  
   
This list is accurate as of 5/14/18  3:04 PM.  Always use your most recent med list.  
  
  
  
  
 amoxicillin 500 mg capsule Commonly known as:  AMOXIL Take 500 mg by mouth two (2) times a day. aspirin delayed-release 81 mg tablet Take  by mouth daily. benzonatate 100 mg capsule Commonly known as:  TESSALON  
TAKE ONE CAPSULE 3 TIMES A DAY AS NEEDED FOR COUGH SWALLOW WHOLE  
  
 budesonide 180 mcg/actuation Aepb inhaler Commonly known as:  PULMICORT Take 1 Puff by inhalation two (2) times a day. furosemide 40 mg tablet Commonly known as:  LASIX Take 1 Tab by mouth daily. lisinopril 20 mg tablet Commonly known as:  Polly [place] Take 1 Tab by mouth daily. loratadine 10 mg dissolvable tablet Commonly known as:  Deloria Minus Take 1 Tab by mouth daily. potassium chloride SR 20 mEq tablet Commonly known as:  K-TAB Take 1 Tab by mouth daily. predniSONE 20 mg tablet Commonly known as:  Nelson Celaya Take 1 Tab by mouth daily. TYLENOL ARTHRITIS 650 mg [place] Generic drug:  acetaminophen Take 650 mg by mouth every six (6) hours as needed for Pain. * VENTOLIN HFA 90 mcg/actuation inhaler Generic drug:  albuterol INHALE 1-2 PUFFS EVERY 4HRS AS NEEDED FOR WHEEZE * albuterol 2.5 mg /3 mL (0.083 %) nebulizer solution Commonly known as:  PROVENTIL VENTOLIN  
3 mL by Nebulization route once for 1 dose. * Notice: This list has 2 medication(s) that are the same as other medications prescribed for you. Read the directions carefully, and ask your doctor or other care provider to review them with you. Prescriptions Sent to Pharmacy Refills  
 loratadine (CLARITIN REDITABS) 10 mg dissolvable tablet 0 Sig: Take 1 Tab by mouth daily. Class: Normal  
 Pharmacy: [facility] [address] EV[address] CASTELLON [address] Ph #: [phone] Route: Oral  
 predniSONE (DELTASONE) 20 mg tablet 0 Sig: Take 1 Tab by mouth daily. Class: Normal  
 Pharmacy: [facility] [address] Ph #: [phone] Route: Oral  
 budesonide (PULMICORT) 180 mcg/actuation aepb inhaler 0 Sig: Take 1 Puff by inhalation two (2) times a day. Class: Normal  
 Pharmacy: [facility] [address] EV[address] CASTELLON [address] Ph #: [phone] Route: Inhalation We Performed the Following ALBUTEROL, INHAL. SOL., FDA-APPROVED FINAL, NON-COMPOUND UNIT DOSE, 1 MG [ [facility] [place]] INHAL RX, AIRWAY OBST/DX SPUTUM INDUCT [id number] CPT(R)] REFERRAL TO PULMONARY DISEASE [YGF30 Custom] Referral Information Referral ID Referred By Referred To  
  
 [id number] Janice Romero MD   
   [address] [place] [address], [address] [place], [place] [address] Phone: [phone] Fax: [phone] Visits Status Start Date End Date 1 New Request 5/14/18 5/14/19 If your referral has a status of pending review or denied, additional information will be sent to support the outcome of this decision. Patient Instructions Please contact our office if you have any questions about your visit today. Introducing [facility] & HEALTH SERVICES! [employer] introduces [employer] patient portal. Now you can access parts of your medical record, email your doctor's office, and request medication refills online. 1. In your internet browser, go to [email or web address] 2. Click on the First Time User? Click Here link in the Sign In box. You will see the New Member Sign Up page. 3. Enter your [employer] Access Code exactly as it appears below. You will not need to use this code after youve completed the sign-up process. If you do not sign up before the expiration date, you must request a new code. · [employer] Access Code: [email or web address] Expires: 6/4/2018 10:43 AM 
 
4. Enter the last four digits of your Social Security Number (xxxx) and Date of Birth (mm/dd/yyyy) as indicated and click Submit. You will be taken to the next sign-up page. 5. Create a [employer] ID. This will be your [employer] login ID and cannot be changed, so think of one that is secure and easy to remember. 6. Create a [employer] password. You can change your password at any time. 7. Enter your Password Reset Question and Answer. This can be used at a later time if you forget your password. 8. Enter your e-mail address. You will receive e-mail notification when new information is available in [address] 9. Click Sign Up. You can now view and download portions of your medical record. 10. Click the Download Summary menu link to download a portable copy of your medical information. If you have questions, please visit the Frequently Asked Questions section of the [employer] website. Remember, [employer] is NOT to be used for urgent needs. For medical emergencies, dial 911. Now available from your iPhone and Android! Please provide this summary of care documentation to your next provider. Your primary care clinician is listed as RIN PINEDO. If you have any questions after today's visit, please call [phone].

## 2018-05-14 NOTE — PROGRESS NOTES
Chief Complaint   Patient presents with    Follow-up     Patient was seen at [facility] ER 5/13/2018 for Upper Respiratory Infection     1. Have you been to the ER, urgent care clinic since your last visit? Hospitalized since your last visit? Yes When: 5/13/2018 Where: [facility] Reason for visit: Upper Respiratory Infection    2. Have you seen or consulted any other health care providers outside of the [address] [place] [address] since your last visit? Include any pap smears or colon screening. No     Health Maintenance Due   Topic Date Due    DTaP/Tdap/Td series (1 - Tdap) 01/17/1973    GLAUCOMA SCREENING Q2Y  01/17/2017    Bone Densitometry (Dexa) Screening  01/17/2017    Pneumococcal 65+ Low/Medium Risk (2 of 2 - PPSV23) 02/22/2018    MEDICARE YEARLY EXAM  03/14/2018       Patient was re-evaluated post Albuterol treatment that was given in office. Patient states that she feels a slight improvement. Provider made aware.

## 2018-05-14 NOTE — PROGRESS NOTES
ROYAL San is a 79 y.o. female. Accompanied by her daughter with a list of request from other family members. Chief Complaint   Patient presents with    Follow-up     Patient was seen at [facility] ER 2018 for Upper Respiratory Infection     Reports she was given Tessalon and an albuterol inhaler in the ER on 18. Reports she was given a breathing treatment in the ER that helped. Denies fevers or chills. Reports she is around second hand smoke that aggravates her asthma. Denies smoking her self. Reports her father had COPD. Reports shortness of breath. Denies wheezing. Reports she still has a cough \"sometimes\" but denies coughing up sputum. Patient thinks her asthma is causing her to be short of breath. Reports the pollen bothers her when she is outside that also causes her asthma to act up. Reports she feels a little tight. Reports using her albuterol daily and several times. Reports the albuterol in haler does help. Denies taking anything for her allergies. Daughter presents with patient requesting a referral to pulmonology  Past Medical History  Past Medical History:   Diagnosis Date    Asthma     Hypertension     Latent syphilis 3/22/2017    Osteoarthritis of ankle     Right ankle pain     posterior tibial tendon interstitial tear    Right foot pain     hindfoot arthrosis    Sleep disorder        Surgical History  Past Surgical History:   Procedure Laterality Date    HX  SECTION          Medications  Current Outpatient Prescriptions   Medication Sig Dispense Refill    VENTOLIN HFA 90 mcg/actuation inhaler INHALE 1-2 PUFFS EVERY 4HRS AS NEEDED FOR WHEEZE  0    benzonatate (TESSALON) 100 mg capsule TAKE ONE CAPSULE 3 TIMES A DAY AS NEEDED FOR COUGH SWALLOW WHOLE  0    loratadine (CLARITIN REDITABS) 10 mg dissolvable tablet Take 1 Tab by mouth daily. 30 Tab 0    predniSONE (DELTASONE) 20 mg tablet Take 1 Tab by mouth daily.  3 Tab 0    budesonide (PULMICORT) 180 mcg/actuation aepb inhaler Take 1 Puff by inhalation two (2) times a day. 1 Inhaler 0    lisinopril (PRINIVIL, ZESTRIL) 20 mg tablet Take 1 Tab by mouth daily. 90 Tab 1    furosemide (LASIX) 40 mg tablet Take 1 Tab by mouth daily. 30 Tab 3    amoxicillin (AMOXIL) 500 mg capsule Take 500 mg by mouth two (2) times a day.  potassium chloride SR (K-TAB) 20 mEq tablet Take 1 Tab by mouth daily. 30 Tab 3    aspirin delayed-release 81 mg tablet Take  by mouth daily.  acetaminophen (TYLENOL ARTHRITIS) 650 mg CR tablet Take 650 mg by mouth every six (6) hours as needed for Pain. Allergies  No Known Allergies    Family History  Family History   Problem Relation Age of Onset    Diabetes Mother     Hypertension Mother     Heart Disease Father     Hypertension Father     Gout Father     Heart Attack Brother     Schizophrenia Brother        Social History  Social History     Social History    Marital status:      Spouse name: N/A    Number of children: N/A    Years of education: N/A     Occupational History    Not on file. Social History Main Topics    Smoking status: Never Smoker    Smokeless tobacco: Never Used    Alcohol use No    Drug use: No    Sexual activity: No     Other Topics Concern    Not on file     Social History Narrative       Problem List  Patient Active Problem List   Diagnosis Code    HTN (hypertension) I10    Hyperlipidemia E78.5    Family history of heart attack, premature, brother 46s Z80.55    Prediabetes R78.1    Family history of diabetes mellitus type II, mother Z80.1    Vitamin D deficiency E55.9    ACP (advance care planning) Z71.89    Latent syphilis A53.0    Obesity, morbid ([facility] [address] E66.01       Review of Systems  Review of Systems   HENT: Positive for congestion. Eyes: Positive for discharge. Respiratory: Positive for cough and shortness of breath. Negative for sputum production and wheezing.     Cardiovascular: Negative for chest pain. Endo/Heme/Allergies: Positive for environmental allergies. Vital Signs  Vitals:    05/14/18 1402 05/14/18 1403   BP: (!) 138/91 128/72   Pulse: 88    Resp: 16    Temp: 98.3 °F (36.8 °C)    TempSrc: Oral    SpO2: 96%    Weight: 208 lb (94.3 kg)    Height: 5' (1.524 m)    PainSc:   0 - No pain        Physical Exam  Physical Exam   Constitutional: She is oriented to person, place, and time. HENT:   Nose: Nose normal.   Mouth/Throat: Oropharynx is clear and moist.   Cardiovascular: Normal rate, regular rhythm, normal heart sounds and intact distal pulses. Pulmonary/Chest: Effort normal. No respiratory distress. She has decreased breath sounds in the right upper field and the left upper field. She has no wheezes. Abdominal: Soft. Bowel sounds are normal.   Musculoskeletal: She exhibits edema (bilateral ankle edema +2 nonpitting.). Neurological: She is alert and oriented to person, place, and time. Psychiatric: She has a normal mood and affect. Diagnostics  Orders Placed This Encounter    INHAL RX, AIRWAY OBST/DX SPUTUM INDUCT ([id number])   Terrance Jeans Pulmonary Ref [facility]     Referral Priority:   Routine     Referral Type:   Consultation     Referral Reason:   Specialty Services Required     Referred to Provider:   Prince Arroyo MD    ALBUTEROL, INHAL. ORLIN.()    VENTOLIN HFA 90 mcg/actuation inhaler     Sig: INHALE 1-2 PUFFS EVERY 4HRS AS NEEDED FOR WHEEZE     Refill:  0    benzonatate (TESSALON) 100 mg capsule     Sig: TAKE ONE CAPSULE 3 TIMES A DAY AS NEEDED FOR COUGH SWALLOW WHOLE     Refill:  0    albuterol (PROVENTIL VENTOLIN) 2.5 mg /3 mL (0.083 %) nebulizer solution     Sig: 3 mL by Nebulization route once for 1 dose. Dispense:  1 Each     Refill:  0    loratadine (CLARITIN REDITABS) 10 mg dissolvable tablet     Sig: Take 1 Tab by mouth daily. Dispense:  30 Tab     Refill:  0    predniSONE (DELTASONE) 20 mg tablet     Sig: Take 1 Tab by mouth daily. Dispense:  3 Tab     Refill:  0    budesonide (PULMICORT) 180 mcg/actuation aepb inhaler     Sig: Take 1 Puff by inhalation two (2) times a day. Dispense:  1 Inhaler     Refill:  0       Results  Results for orders placed or performed during the hospital encounter of 03/06/18   CULTURE, WOUND W GRAM STAIN   Result Value Ref Range    Special Requests: NO SPECIAL REQUESTS      GRAM STAIN MANY WBC'S      GRAM STAIN MANY GRAM POSITIVE COCCI IN PAIRS      GRAM STAIN FEW GRAM POSITIVE COCCI IN GROUPS      GRAM STAIN RARE GRAM POSITIVE RODS      GRAM STAIN MODERATE GRAM NEGATIVE RODS      Culture result: RARE STAPHYLOCOCCUS SIMULANS (A)      Culture result: MODERATE DIPHTHEROIDS (TWO MORPHOTYPES) (A)      Culture result: MODERATE STREPTOCOCCUS VIRIDANS (A)      Culture result: (A)       ANAEROBIC GRAM NEGATIVE RODS ISOLATED FROM BROTH ONLY UNABLE TO CULTIVATE FOR IDENTIFICATION       Susceptibility    Staphylococcus simulans - LEDY     Ampicillin/sulbactam ($) DEDUCED SENSITIVE Susceptible ug/mL     Penicillin G ($$) <=0.03 Resistant ug/mL     Cefazolin ($) DEDUCED SENSITIVE Susceptible ug/mL     Clindamycin ($) <=0.25 Resistant ug/mL     Erythromycin ($$$$) 1 Resistant ug/mL     Gentamicin ($) <=0.5 Susceptible ug/mL     Levofloxacin ($) <=0.12 Susceptible ug/mL     Oxacillin <=0.25 Susceptible ug/mL     Rifampin ($$$$)* <=0.5 Susceptible ug/mL      * Rifampin is not to be used for mono-therapy.      Tetracycline <=1 Susceptible ug/mL     Vancomycin ($) <=0.5 Susceptible ug/mL     Trimeth-Sulfamethoxa <=10 Susceptible ug/mL     Tigecycline ($$$$) <=0.12 Susceptible ug/mL   CBC WITH AUTOMATED DIFF   Result Value Ref Range    WBC 4.5 (L) 4.6 - 13.2 K/uL    RBC 5.00 4.20 - 5.30 M/uL    HGB 11.3 (L) 12.0 - 16.0 g/dL    HCT 37.7 35.0 - 45.0 %    MCV 75.4 74.0 - 97.0 FL    MCH 22.6 (L) 24.0 - 34.0 PG    MCHC 30.0 (L) 31.0 - 37.0 g/dL    RDW 17.0 (H) 11.6 - 14.5 %    PLATELET 108 273 - 932 K/uL    MPV 11.1 9.2 - 11.8 FL NEUTROPHILS 60 40 - 73 %    LYMPHOCYTES 28 21 - 52 %    MONOCYTES 9 3 - 10 %    EOSINOPHILS 3 0 - 5 %    BASOPHILS 0 0 - 2 %    ABS. NEUTROPHILS 2.7 1.8 - 8.0 K/UL    ABS. LYMPHOCYTES 1.3 0.9 - 3.6 K/UL    ABS. MONOCYTES 0.4 0.05 - 1.2 K/UL    ABS. EOSINOPHILS 0.1 0.0 - 0.4 K/UL    ABS. BASOPHILS 0.0 0.0 - 0.06 K/UL    DF AUTOMATED     METABOLIC PANEL, COMPREHENSIVE   Result Value Ref Range    Sodium 146 (H) 136 - 145 mmol/L    Potassium 3.7 3.5 - 5.5 mmol/L    Chloride 112 (H) 100 - 108 mmol/L    CO2 26 21 - 32 mmol/L    Anion gap 8 3.0 - 18 mmol/L    Glucose 95 74 - 99 mg/dL    BUN 17 7.0 - 18 MG/DL    Creatinine 1.03 0.6 - 1.3 MG/DL    BUN/Creatinine ratio 17 12 - 20      GFR est AA >60 >60 ml/min/1.73m2    GFR est non-AA 54 (L) >60 ml/min/1.73m2    Calcium 8.4 (L) 8.5 - 10.1 MG/DL    Bilirubin, total 0.4 0.2 - 1.0 MG/DL    ALT (SGPT) 19 13 - 56 U/L    AST (SGOT) 14 (L) 15 - 37 U/L    Alk. phosphatase 111 45 - 117 U/L    Protein, total 7.3 6.4 - 8.2 g/dL    Albumin 3.4 3.4 - 5.0 g/dL    Globulin 3.9 2.0 - 4.0 g/dL    A-G Ratio 0.9 0.8 - 1.7     HEMOGLOBIN A1C WITH EAG   Result Value Ref Range    Hemoglobin A1c 6.0 (H) 4.2 - 5.6 %    Est. average glucose 126 mg/dL           Assessment and Plan  Diagnoses and all orders for this visit:    1. Upper respiratory tract infection, unspecified type  -     albuterol (PROVENTIL VENTOLIN) 2.5 mg /3 mL (0.083 %) nebulizer solution; 3 mL by Nebulization route once for 1 dose. -     ALBUTEROL, INHAL. M.()  -     INHAL RX, AIRWAY OBST/DX SPUTUM INDUCT ([id number])    2. Moderate persistent asthma with exacerbation  -     predniSONE (DELTASONE) 20 mg tablet; Take 1 Tab by mouth daily. -     budesonide (PULMICORT) 180 mcg/actuation aepb inhaler; Take 1 Puff by inhalation two (2) times a day. Jovani Morfin Pulmonary Ref [facility]    3. Environmental and seasonal allergies  -     loratadine (CLARITIN REDITABS) 10 mg dissolvable tablet; Take 1 Tab by mouth daily.       In office albuterol nebulizer effective with patient stating she feels much better. Upper lobes opened with improved and clear breath sounds. Medication, side effects, possible allergic reactions and warnings reviewed with patient. Patient verbalized understanding. After care summary printed and reviewed with patient. Plan reviewed with patient. Questions answered. Patient verbalized understanding of plan and is in agreement with plan. Patient to follow up in one week or earlier if symptoms worsen.      Timbo Fraga, TONYP-C

## 2018-05-16 ENCOUNTER — TELEPHONE (OUTPATIENT)
Dept: FAMILY MEDICINE CLINIC | Age: 68
End: 2018-05-16

## 2018-05-16 DIAGNOSIS — J45.909 UNCOMPLICATED ASTHMA, UNSPECIFIED ASTHMA SEVERITY, UNSPECIFIED WHETHER PERSISTENT: Primary | ICD-10-CM

## 2018-05-16 NOTE — TELEPHONE ENCOUNTER
Patient has been called and informed that Provider Ketty Alegria has placed a new prescription for Flovent. Patient has been made aware that it is available for  at her pharmacy of record for $4.01 for a 30 day supply.

## 2018-05-16 NOTE — TELEPHONE ENCOUNTER
Patient was prescribed Pulmicort by last provider to see patient in office. Patient insurance company rejected the medication as it is not on their formulary. Patient insurance company has been called and nurse has talked to their on duty pharmacist about which inhalers are covered. [facility] pharmacist gave two alternatives to the Pulmicort (Arnuity ellipta and Flovent). Both alternatives have been sent to Provider Sayra Santana.

## 2018-05-24 ENCOUNTER — OFFICE VISIT (OUTPATIENT)
Dept: FAMILY MEDICINE CLINIC | Age: 68
End: 2018-05-24

## 2018-05-24 VITALS
SYSTOLIC BLOOD PRESSURE: 114 MMHG | HEIGHT: 60 IN | DIASTOLIC BLOOD PRESSURE: 75 MMHG | HEART RATE: 79 BPM | OXYGEN SATURATION: 97 % | RESPIRATION RATE: 16 BRPM | BODY MASS INDEX: 39.66 KG/M2 | TEMPERATURE: 97.5 F | WEIGHT: 202 LBS

## 2018-05-24 DIAGNOSIS — J45.41 MODERATE PERSISTENT ASTHMA WITH EXACERBATION: Primary | ICD-10-CM

## 2018-05-24 NOTE — MR AVS SNAPSHOT
[address] [facility] Lisseth Rojas [address] [address] [address] [address] [phone] Patient: Loida Nam MRN: [id number] GEW:[id number] Visit Information Date & Time Provider Department Dept. Phone Encounter #  
 5/24/2018  2:30 PM Diandra Link NP [address] [facility] [phone] [id number] Your Appointments 6/6/2018 12:45 PM  
PROCEDURE with [facility] ([facility]) Appt Note: mellissa reflux wild same day appt; r/s; R/S; OFFICE RESCHEDULED DUE TO [facility] OUT OF OFFICE; .  
 27 Phyllis MarshcinthyavanessaCha villarreal 9[address] [place] [address]  
[phone] [address] [place] [address]  
  
    
 6/6/2018  3:30 PM  
Follow Up with [address] [place] [address] [facility] ([facility]) Appt Note: transportation issues; r/s; R/S; OFFICE RESCHEDULED DUE TO [facility] OUT OF OFFICE; .  
 27 Johnathancherelle MarshhCa hernandez 88 706 [facility]  
[phone] [address] [place], [place] [address]  
  
    
 6/18/2018 10:00 AM  
Follow Up with Diandra Link NP [address] [facility] (--) Appt Note: bharath Rojas 57 [facility] [address]  
[phone]  
  
   
 [address] [place] [address]  
  
    
 6/26/2018 10:00 AM  
XRAY with [facility] [address] ([facility]) Appt Note: np apt w/ejf 11am; roberto [address] [facility] [id number] [phone]  
  
   
 [address] [place] [address], [address] [place] [address],[address]  [address] [place] [address]  
  
    
 6/26/2018 10:30 AM  
Nurse Visit with [facility] [address] ([facility]) Appt Note: np apt w/ejf 11am; cxr 10am  
 [address] [facility], [address] [place] [address]  
[phone]  
  
   
 [address] [place] [address], [address] [facility],[address]  [address] [place] [address]  
  
    
 6/26/2018 11:00 AM  
New Patient with Lazarus Braswell MD  
 [address] ([address]) Appt Note: NP Anne Marte/asthma; roberto 1030; cxr 10am  
 [address] [facility], [address] [place] [address]  
[phone]  
  
   
 [address] [place] [address], [address] [facility] [address]  [address] [place] [address] Upcoming Health Maintenance Date Due DTaP/Tdap/Td series (1 - Tdap) 1/17/1973 GLAUCOMA SCREENING Q2Y 1/17/2017 Bone Densitometry (Dexa) Screening 1/17/2017 Pneumococcal 65+ Low/Medium Risk (2 of 2 - PPSV23) 2/22/2018 MEDICARE YEARLY EXAM 3/14/2018 Influenza Age 5 to Adult 8/1/2018 BREAST CANCER SCRN MAMMOGRAM 8/8/2019 COLONOSCOPY 8/25/2025 Allergies as of 5/24/2018  Review Complete On: 5/24/2018 By: Lori Grubbs LPN No Known Allergies Current Immunizations  Reviewed on 11/21/2016 Name Date Influenza High Dose Vaccine PF 11/27/2017 Influenza Vaccine (Quad) PF 9/28/2016 Influenza Vaccine PF 8/26/2014 Pneumococcal Conjugate (PCV-13) 2/22/2017 Zoster Vaccine, Live 12/3/2014 Not reviewed this visit Vitals BP Pulse Temp Resp Height(growth percentile) Weight(growth percentile) 114/75 (BP 1 Location: Right arm, BP Patient Position: Sitting) 79 97.5 °F (36.4 °C) (Oral) 16 5' (1.524 m) 202 lb (91.6 kg) SpO2 BMI OB Status Smoking Status 97% 39.45 kg/m2 Postmenopausal Never Smoker BMI and BSA Data Body Mass Index Body Surface Area  
 39.45 kg/m 2 1.97 m 2 Preferred Pharmacy Pharmacy Name Phone [id number] [place], [address] Terrance Jeans [phone] Your Updated Medication List  
  
   
This list is accurate as of 5/24/18  3:23 PM.  Always use your most recent med list.  
  
  
  
  
 amoxicillin 500 mg capsule Commonly known as:  AMOXIL Take 500 mg by mouth two (2) times a day. aspirin delayed-release 81 mg tablet Take  by mouth daily. benzonatate 100 mg capsule Commonly known as:  TESSALON  
 TAKE ONE CAPSULE 3 TIMES A DAY AS NEEDED FOR COUGH SWALLOW WHOLE  
  
 fluticasone 100 mcg/actuation Dsdv Commonly known as:  FLOVENT DISKUS Take 100 mcg by inhalation two (2) times a day. furosemide 40 mg tablet Commonly known as:  LASIX Take 1 Tab by mouth daily. lisinopril 20 mg tablet Commonly known as:  Sarita Gonzales Take 1 Tab by mouth daily. loratadine 10 mg dissolvable tablet Commonly known as:  Waymond Carroll Take 1 Tab by mouth daily. potassium chloride SR 20 mEq tablet Commonly known as:  K-TAB Take 1 Tab by mouth daily. predniSONE 20 mg tablet Commonly known as:  Coronel Aver Take 1 Tab by mouth daily. TYLENOL ARTHRITIS 650 mg Robertha Slates Generic drug:  acetaminophen Take 650 mg by mouth every six (6) hours as needed for Pain. VENTOLIN HFA 90 mcg/actuation inhaler Generic drug:  albuterol INHALE 1-2 PUFFS EVERY 4HRS AS NEEDED FOR WHEEZE Patient Instructions Please contact our office if you have any questions about your visit today. Introducing [facility]! [employer] introduces [employer] patient portal. Now you can access parts of your medical record, email your doctor's office, and request medication refills online. 1. In your internet browser, go to [email or web address] 2. Click on the First Time User? Click Here link in the Sign In box. You will see the New Member Sign Up page. 3. Enter your [employer] Access Code exactly as it appears below. You will not need to use this code after youve completed the sign-up process. If you do not sign up before the expiration date, you must request a new code. · [employer] Access Code: [email or web address] Expires: 6/4/2018 10:43 AM 
 
4. Enter the last four digits of your Social Security Number (xxxx) and Date of Birth (mm/dd/yyyy) as indicated and click Submit. You will be taken to the next sign-up page. 5. Create a [employer] ID. This will be your [employer] login ID and cannot be changed, so think of one that is secure and easy to remember. 6. Create a [employer] password. You can change your password at any time. 7. Enter your Password Reset Question and Answer. This can be used at a later time if you forget your password. 8. Enter your e-mail address. You will receive e-mail notification when new information is available in [address] 9. Click Sign Up. You can now view and download portions of your medical record. 10. Click the Download Summary menu link to download a portable copy of your medical information. If you have questions, please visit the Frequently Asked Questions section of the [employer] website. Remember, [employer] is NOT to be used for urgent needs. For medical emergencies, dial 911. Now available from your iPhone and Android! Please provide this summary of care documentation to your next provider. Your primary care clinician is listed as RIN PINEDO. If you have any questions after today's visit, please call [phone].

## 2018-05-24 NOTE — PROGRESS NOTES
Chief Complaint   Patient presents with    Follow-up     Patient seen at [facility] ER for respiratory issue 5/17/2018. Patient states that the physician at the ER suggested getting a nebulizer at home     1. Have you been to the ER, urgent care clinic since your last visit? Hospitalized since your last visit? Yes When: 5/17/2018 Where: [facility] Reason for visit: Respiratory Problem    2. Have you seen or consulted any other health care providers outside of the [address] [place] [address] since your last visit? Include any pap smears or colon screening.  No     Health Maintenance Due   Topic Date Due    DTaP/Tdap/Td series (1 - Tdap) 01/17/1973    GLAUCOMA SCREENING Q2Y  01/17/2017    Bone Densitometry (Dexa) Screening  01/17/2017    Pneumococcal 65+ Low/Medium Risk (2 of 2 - PPSV23) 02/22/2018    MEDICARE YEARLY EXAM  03/14/2018

## 2018-05-24 NOTE — LETTER
NOTIFICATION RETURN TO WORK / SCHOOL 
 
5/24/2018 3:29 PM 
Wilmer Leal [address] [address] To Whom It May Concern: Wilmer Leal accompanied her mother Andrés Mccoy for an appointment at  [place] [address] on 5-24-18. She will return to work/school on: 5-25-18 If there are questions or concerns please have the patient contact our office.  
 
 
 
Sincerely, 
 
 
Nichole Beckett, NP

## 2018-05-26 NOTE — PROGRESS NOTES
HPI  Paulino Barnes is a 79 y.o. female  Chief Complaint   Patient presents with    Follow-up     Patient seen at [facility] ER for respiratory issue 2018. Patient states that the physician at the ER suggested getting a nebulizer at home     Reports she has shortness of breath on and off. Reports she just got her inhalers that were ordered on the last visit and she has not really had the opportunity to use them. Denies chest pain. Denies coughing. Wheezing at times. Past Medical History  Past Medical History:   Diagnosis Date    Asthma     Hypertension     Latent syphilis 3/22/2017    Osteoarthritis of ankle     Right ankle pain     posterior tibial tendon interstitial tear    Right foot pain     hindfoot arthrosis    Sleep disorder        Surgical History  Past Surgical History:   Procedure Laterality Date    HX  SECTION          Medications  Current Outpatient Prescriptions   Medication Sig Dispense Refill    Nebulizer Accessories kit To use with nebulizer 1 Kit 0    Nebulizer & Compressor machine 1 Each by Does Not Apply route daily as needed. 1 Each 0    fluticasone (FLOVENT DISKUS) 100 mcg/actuation dsdv Take 100 mcg by inhalation two (2) times a day. 1 Each 0    VENTOLIN HFA 90 mcg/actuation inhaler INHALE 1-2 PUFFS EVERY 4HRS AS NEEDED FOR WHEEZE  0    loratadine (CLARITIN REDITABS) 10 mg dissolvable tablet Take 1 Tab by mouth daily. 30 Tab 0    lisinopril (PRINIVIL, ZESTRIL) 20 mg tablet Take 1 Tab by mouth daily. 90 Tab 1    furosemide (LASIX) 40 mg tablet Take 1 Tab by mouth daily. 30 Tab 3    aspirin delayed-release 81 mg tablet Take  by mouth daily.  acetaminophen (TYLENOL ARTHRITIS) 650 mg CR tablet Take 650 mg by mouth every six (6) hours as needed for Pain.  benzonatate (TESSALON) 100 mg capsule TAKE ONE CAPSULE 3 TIMES A DAY AS NEEDED FOR COUGH SWALLOW WHOLE  0    predniSONE (DELTASONE) 20 mg tablet Take 1 Tab by mouth daily.  3 Tab 0    amoxicillin (AMOXIL) 500 mg capsule Take 500 mg by mouth two (2) times a day.  potassium chloride SR (K-TAB) 20 mEq tablet Take 1 Tab by mouth daily. 27 Tab 3       Allergies  No Known Allergies    Family History  Family History   Problem Relation Age of Onset    Diabetes Mother     Hypertension Mother     Heart Disease Father     Hypertension Father     Gout Father     Heart Attack Brother     Schizophrenia Brother        Social History  Social History     Social History    Marital status:      Spouse name: N/A    Number of children: N/A    Years of education: N/A     Occupational History    Not on file. Social History Main Topics    Smoking status: Never Smoker    Smokeless tobacco: Never Used    Alcohol use No    Drug use: No    Sexual activity: No     Other Topics Concern    Not on file     Social History Narrative       Problem List  Patient Active Problem List   Diagnosis Code    HTN (hypertension) I10    Hyperlipidemia E78.5    Family history of heart attack, premature, brother 46s Z80.55    Prediabetes R78.1    Family history of diabetes mellitus type II, mother Z80.1    Vitamin D deficiency E55.9    ACP (advance care planning) Z71.89    Latent syphilis A53.0    Obesity, morbid ([address] E66.01       Review of Systems  Review of Systems   Constitutional: Negative for chills and fever. Respiratory: Positive for shortness of breath and wheezing. Negative for cough. Cardiovascular: Negative for chest pain. Vital Signs  Vitals:    05/24/18 1429   BP: 114/75   Pulse: 79   Resp: 16   Temp: 97.5 °F (36.4 °C)   TempSrc: Oral   SpO2: 97%   Weight: 202 lb (91.6 kg)   Height: 5' (1.524 m)   PainSc:   0 - No pain       Physical Exam  Physical Exam   Constitutional: She is oriented to person, place, and time. HENT:   Mouth/Throat: Oropharynx is clear and moist.   Cardiovascular: Normal rate and regular rhythm.     Pulmonary/Chest: Effort normal and breath sounds normal. No respiratory distress. She has no wheezes. Neurological: She is alert and oriented to person, place, and time. Psychiatric: She has a normal mood and affect. Her behavior is normal.   Vitals reviewed. Diagnostics  Orders Placed This Encounter    albuterol (PROVENTIL VENTOLIN) 2.5 mg /3 mL (0.083 %) nebulizer solution     Sig: 3 mL by Nebulization route once for 1 dose. Dispense:  1 Each     Refill:  0    Nebulizer Accessories kit     Sig: To use with nebulizer     Dispense:  1 Kit     Refill:  0    Nebulizer & Compressor machine     Si Each by Does Not Apply route daily as needed. Dispense:  1 Each     Refill:  0       Results  Results for orders placed or performed during the hospital encounter of 18   CULTURE, WOUND W GRAM STAIN   Result Value Ref Range    Special Requests: NO SPECIAL REQUESTS      GRAM STAIN MANY WBC'S      GRAM STAIN MANY GRAM POSITIVE COCCI IN PAIRS      GRAM STAIN FEW GRAM POSITIVE COCCI IN GROUPS      GRAM STAIN RARE GRAM POSITIVE RODS      GRAM STAIN MODERATE GRAM NEGATIVE RODS      Culture result: RARE STAPHYLOCOCCUS SIMULANS (A)      Culture result: MODERATE DIPHTHEROIDS (TWO MORPHOTYPES) (A)      Culture result: MODERATE STREPTOCOCCUS VIRIDANS (A)      Culture result: (A)       ANAEROBIC GRAM NEGATIVE RODS ISOLATED FROM BROTH ONLY UNABLE TO CULTIVATE FOR IDENTIFICATION       Susceptibility    Staphylococcus simulans - LEDY     Ampicillin/sulbactam ($) DEDUCED SENSITIVE Susceptible ug/mL     Penicillin G ($$) <=0.03 Resistant ug/mL     Cefazolin ($) DEDUCED SENSITIVE Susceptible ug/mL     Clindamycin ($) <=0.25 Resistant ug/mL     Erythromycin ($$$$) 1 Resistant ug/mL     Gentamicin ($) <=0.5 Susceptible ug/mL     Levofloxacin ($) <=0.12 Susceptible ug/mL     Oxacillin <=0.25 Susceptible ug/mL     Rifampin ($$$$)* <=0.5 Susceptible ug/mL      * Rifampin is not to be used for mono-therapy.      Tetracycline <=1 Susceptible ug/mL     Vancomycin ($) <=0.5 Susceptible ug/mL     Trimeth-Sulfamethoxa <=10 Susceptible ug/mL     Tigecycline ($$$$) <=0.12 Susceptible ug/mL   CBC WITH AUTOMATED DIFF   Result Value Ref Range    WBC 4.5 (L) 4.6 - 13.2 K/uL    RBC 5.00 4.20 - 5.30 M/uL    HGB 11.3 (L) 12.0 - 16.0 g/dL    HCT 37.7 35.0 - 45.0 %    MCV 75.4 74.0 - 97.0 FL    MCH 22.6 (L) 24.0 - 34.0 PG    MCHC 30.0 (L) 31.0 - 37.0 g/dL    RDW 17.0 (H) 11.6 - 14.5 %    PLATELET 470 912 - 191 K/uL    MPV 11.1 9.2 - 11.8 FL    NEUTROPHILS 60 40 - 73 %    LYMPHOCYTES 28 21 - 52 %    MONOCYTES 9 3 - 10 %    EOSINOPHILS 3 0 - 5 %    BASOPHILS 0 0 - 2 %    ABS. NEUTROPHILS 2.7 1.8 - 8.0 K/UL    ABS. LYMPHOCYTES 1.3 0.9 - 3.6 K/UL    ABS. MONOCYTES 0.4 0.05 - 1.2 K/UL    ABS. EOSINOPHILS 0.1 0.0 - 0.4 K/UL    ABS. BASOPHILS 0.0 0.0 - 0.06 K/UL    DF AUTOMATED     METABOLIC PANEL, COMPREHENSIVE   Result Value Ref Range    Sodium 146 (H) 136 - 145 mmol/L    Potassium 3.7 3.5 - 5.5 mmol/L    Chloride 112 (H) 100 - 108 mmol/L    CO2 26 21 - 32 mmol/L    Anion gap 8 3.0 - 18 mmol/L    Glucose 95 74 - 99 mg/dL    BUN 17 7.0 - 18 MG/DL    Creatinine 1.03 0.6 - 1.3 MG/DL    BUN/Creatinine ratio 17 12 - 20      GFR est AA >60 >60 ml/min/1.73m2    GFR est non-AA 54 (L) >60 ml/min/1.73m2    Calcium 8.4 (L) 8.5 - 10.1 MG/DL    Bilirubin, total 0.4 0.2 - 1.0 MG/DL    ALT (SGPT) 19 13 - 56 U/L    AST (SGOT) 14 (L) 15 - 37 U/L    Alk. phosphatase 111 45 - 117 U/L    Protein, total 7.3 6.4 - 8.2 g/dL    Albumin 3.4 3.4 - 5.0 g/dL    Globulin 3.9 2.0 - 4.0 g/dL    A-G Ratio 0.9 0.8 - 1.7     HEMOGLOBIN A1C WITH EAG   Result Value Ref Range    Hemoglobin A1c 6.0 (H) 4.2 - 5.6 %    Est. average glucose 126 mg/dL         Assessment and Plan  Diagnoses and all orders for this visit:    1. Moderate persistent asthma with exacerbation  -     albuterol (PROVENTIL VENTOLIN) 2.5 mg /3 mL (0.083 %) nebulizer solution; 3 mL by Nebulization route once for 1 dose. -     Nebulizer Accessories kit;  To use with nebulizer  -     Nebulizer & Compressor machine; 1 Each by Does Not Apply route daily as needed. After care summary printed and reviewed with patient. Plan reviewed with patient. Questions answered. Patient verbalized understanding of plan and is in agreement with plan. Patient to follow up in one week or earlier if symptoms worsen.     Elliot Atkins, ABRAM-C

## 2018-06-06 RX ORDER — ALBUTEROL SULFATE 0.83 MG/ML
2.5 SOLUTION RESPIRATORY (INHALATION) ONCE
Qty: 1 EACH | Refills: 0
Start: 2018-06-06 | End: 2018-06-06

## 2018-06-06 RX ORDER — NEBULIZER AND COMPRESSOR
1 EACH MISCELLANEOUS
Qty: 1 EACH | Refills: 0 | Status: SHIPPED | OUTPATIENT
Start: 2018-06-06

## 2018-06-26 ENCOUNTER — OFFICE VISIT (OUTPATIENT)
Dept: PULMONOLOGY | Age: 68
End: 2018-06-26

## 2018-06-26 VITALS
OXYGEN SATURATION: 99 % | SYSTOLIC BLOOD PRESSURE: 110 MMHG | RESPIRATION RATE: 20 BRPM | HEIGHT: 60 IN | DIASTOLIC BLOOD PRESSURE: 60 MMHG | HEART RATE: 60 BPM | BODY MASS INDEX: 38.77 KG/M2 | WEIGHT: 197.5 LBS | TEMPERATURE: 97.9 F

## 2018-06-26 DIAGNOSIS — R06.02 SOB (SHORTNESS OF BREATH): ICD-10-CM

## 2018-06-26 DIAGNOSIS — J45.909 UNCOMPLICATED ASTHMA, UNSPECIFIED ASTHMA SEVERITY, UNSPECIFIED WHETHER PERSISTENT: Primary | ICD-10-CM

## 2018-06-26 NOTE — PROGRESS NOTES
[facility]  Pulmonary, Critical Care, and Sleep Medicine    Dear Dr. Sarahi Sharp,    Chief complaint:  Shortness of breath    HPI:  Dirk Linder is 79years old and comes to the office today accompanied by her daughter at your request concerning shortness of breath which the patient has noted for about 3 months and attributes to asthma which he had 35 years ago and she relates she out of bed. She takes albuterol for relief and sometimes it helps. She was given an ICS but does not take it. She denies chest pain and says she has a cough which is dry or productive of clear mucus. She has allergy symptoms at times but describes it mostly as a cough. She also has leg swelling which is chronic. No Known Allergies  Current Outpatient Prescriptions   Medication Sig    fluticasone (FLOVENT DISKUS) 100 mcg/actuation dsdv Take 100 mcg by inhalation two (2) times a day.  VENTOLIN HFA 90 mcg/actuation inhaler INHALE 1-2 PUFFS EVERY 4HRS AS NEEDED FOR WHEEZE    loratadine (CLARITIN REDITABS) 10 mg dissolvable tablet Take 1 Tab by mouth daily.  lisinopril (PRINIVIL, ZESTRIL) 20 mg tablet Take 1 Tab by mouth daily.  furosemide (LASIX) 40 mg tablet Take 1 Tab by mouth daily.  aspirin delayed-release 81 mg tablet Take  by mouth daily.  acetaminophen (TYLENOL ARTHRITIS) 650 mg CR tablet Take 650 mg by mouth every six (6) hours as needed for Pain.  Nebulizer Accessories kit To use with nebulizer    Nebulizer & Compressor machine 1 Each by Does Not Apply route daily as needed.  benzonatate (TESSALON) 100 mg capsule TAKE ONE CAPSULE 3 TIMES A DAY AS NEEDED FOR COUGH SWALLOW WHOLE    predniSONE (DELTASONE) 20 mg tablet Take 1 Tab by mouth daily.  amoxicillin (AMOXIL) 500 mg capsule Take 500 mg by mouth two (2) times a day.  potassium chloride SR (K-TAB) 20 mEq tablet Take 1 Tab by mouth daily. No current facility-administered medications for this visit.       Past Medical History: Diagnosis Date    Asthma     Hypertension     Latent syphilis 3/22/2017    Osteoarthritis of ankle     Right ankle pain     posterior tibial tendon interstitial tear    Right foot pain     hindfoot arthrosis    Sleep disorder    She denies heart disease diabetes kidney disease liver disease ulcers tuberculosis cancer  Past Surgical History:   Procedure Laterality Date    HX  SECTION         Family history: Heart disease    Social History: Lifelong non-smoker    Review of systems:  She denies syncope focal muscle weakness or numbness trouble hearing trouble seeing trouble swallowing chronic abdominal pain melena or blood in her stools dysuria hematuria rashes and she has arthritis and leg ulcers but denies fever chills poor appetite or weight loss    Physical Exam:  /60 pulse 60 temperature 97.9 respirations 20/min and unlabored height 5 feet weight 197.5 pounds WD/obese  HEENT: pupils equal, reactive, sclera, non-icteric   Oropharynx tongue: normal   Neck: Supple   Lymph Nodes: Supra clavicular and cervical nodes, negative   Chest: Equal symmetrical expansion, no dullness, no wheezes, rales or rubs   Heart: Regular, rhythm without philip or murmur no carotid bruits  Abdomen: soft, non-tender no masses or organomegaly   Extremities: no cyanosis, clubbing, 1+ edema bilateral lower extremities no calf tenderness  Musculoskeletal: No acute joint inflammation or muscle wasting  Skin: No rash   Neurological: alert, oriented but occasionally confused, moves all extremities      LABS:  O2 sat room air at rest 99%   Chest x-ray 18 report no abnormalities  Spirometry 18: Very mild obstructive defect but patient unable to perform test properly making results  Impression:   Because of patient's shortness of breath is not clear she is unable to perform spirometry from reactive airway disease.   Because she has a diagnosis of sleep disorder and leg edema need to rule out pulmonary hypertension    Plan:  As needed albuterol  Echocardiogram to rule out pulmonary hypertension  Follow-up in 4 weeks    Thanks to share in this patient's evaluation    Sincerely,    Reinaldo Vega MD , [facility]    CC: Beatrice Dye MD    [address] [facility]  [place], [address] [address]     P: [phone]     F: [phone]

## 2018-06-26 NOTE — MR AVS SNAPSHOT
[address] [facility], [address] [address] 
[phone] Patient: Sia Walls MRN: [id number] LZZ:[id number]6 Visit Information Date & Time Provider Department Dept. Phone Encounter #  
 6/26/2018 11:00 AM Samantha Aivla MD [facility] [place] [id number] Follow-up Instructions Return in about 4 weeks (around 7/24/2018). Your Appointments 7/12/2018 10:00 AM  
PROCEDURE with [facility] ([facility]) Appt Note: mellissa reflux wild same day appt; r/s; R/S; OFFICE RESCHEDULED DUE TO [facility] OUT OF OFFICE; .; pt r/s  
 Cha Scott [address] [facility] [address]  
[phone] [address] [place] [address] 1M07  
  
    
 7/12/2018  1:45 PM  
Follow Up with [facility] ([address] [place] [address]) Appt Note: transportation issues; r/s; R/S; OFFICE RESCHEDULED DUE TO TECH OUT OF OFFICE; .; follow up after studies [address] Taffy Castleman [address] [facility] [address]  
[phone] [address] Taffy Castleman [address] [facility]  
  
    
 7/23/2018  9:15 AM  
Follow Up with Samantha Avila MD  
[address] ([address]) Appt Note: FROM 6/26/18  
 [address] [place] [address], [address] [address]  
[phone]  
  
   
 [address] [place] [address], [address] [facility],[address] [place] [address]  
  
    
 7/26/2018  2:30 PM  
Follow Up with Ameya García NP [address] [place] [address] (--) Appt Note: fu; RESCHEDULED FROM 06/18/2018; Follow up [address] [id number] [address] [address] [phone]  
  
   
 [address] [address] [address] [address] Upcoming Health Maintenance Date Due DTaP/Tdap/Td series (1 - Tdap) 1/17/1973 GLAUCOMA SCREENING Q2Y 1/17/2017 Bone Densitometry (Dexa) Screening 1/17/2017 Pneumococcal 65+ Low/Medium Risk (2 of 2 - PPSV23) 2/22/2018 MEDICARE YEARLY EXAM 3/14/2018 Influenza Age 5 to Adult 8/1/2018 BREAST CANCER SCRN MAMMOGRAM 8/8/2019 COLONOSCOPY 8/25/2025 Allergies as of 6/26/2018  Review Complete On: 6/26/2018 By: Rachael Singh LPN No Known Allergies Current Immunizations  Reviewed on 11/21/2016 Name Date Influenza High Dose Vaccine PF 11/27/2017 Influenza Vaccine (Quad) PF 9/28/2016 Influenza Vaccine PF 8/26/2014 Pneumococcal Conjugate (PCV-13) 2/22/2017 Zoster Vaccine, Live 12/3/2014 Not reviewed this visit You Were Diagnosed With   
  
 Codes Comments Uncomplicated asthma, unspecified asthma severity, unspecified whether persistent    -  Primary ICD-10-CM: J45.909 ICD-9-CM: 493.90   
 SOB (shortness of breath)     ICD-10-CM: R06.02 
ICD-9-CM: 786.05 Vitals BP Pulse Temp Resp Height(growth percentile) Weight(growth percentile) 110/60 (BP 1 Location: Left arm, BP Patient Position: Sitting) 60 97.9 °F (36.6 °C) 20 5' (1.524 m) 197 lb 8 oz (89.6 kg) SpO2 BMI OB Status Smoking Status 99% 38.57 kg/m2 Postmenopausal Never Smoker BMI and BSA Data Body Mass Index Body Surface Area 38.57 kg/m 2 1.95 m 2 Preferred Pharmacy Pharmacy Name Phone [id number]- [address], [address] [facility] [phone] Your Updated Medication List  
  
   
This list is accurate as of 6/26/18 12:07 PM.  Always use your most recent med list.  
  
  
  
  
 amoxicillin 500 mg capsule Commonly known as:  AMOXIL Take 500 mg by mouth two (2) times a day. aspirin delayed-release 81 mg tablet Take  by mouth daily. benzonatate 100 mg capsule Commonly known as:  TESSALON  
TAKE ONE CAPSULE 3 TIMES A DAY AS NEEDED FOR COUGH SWALLOW WHOLE  
  
 fluticasone 100 mcg/actuation Dsdv Commonly known as:  FLOVENT DISKUS Take 100 mcg by inhalation two (2) times a day. furosemide 40 mg tablet Commonly known as:  LASIX Take 1 Tab by mouth daily. lisinopril 20 mg tablet Commonly known as:  Diana Jump Take 1 Tab by mouth daily. loratadine 10 mg dissolvable tablet Commonly known as:  Hilaria Crootis Take 1 Tab by mouth daily. Nebulizer & Compressor machine 1 Each by Does Not Apply route daily as needed. Nebulizer Accessories Kit To use with nebulizer  
  
 potassium chloride SR 20 mEq tablet Commonly known as:  K-TAB Take 1 Tab by mouth daily. predniSONE 20 mg tablet Commonly known as:  Clayborne Edelson Take 1 Tab by mouth daily. TYLENOL ARTHRITIS 650 mg [place] Luz Generic drug:  acetaminophen Take 650 mg by mouth every six (6) hours as needed for Pain. VENTOLIN HFA 90 mcg/actuation inhaler Generic drug:  albuterol INHALE 1-2 PUFFS EVERY 4HRS AS NEEDED FOR WHEEZE We Performed the Following AMB POC PFT COMPLETE W/O BRONCHODILATOR [16124 CPT(R)] Follow-up Instructions Return in about 4 weeks (around 7/24/2018). To-Do List   
 06/26/2018 Procedures: AMB POC PFT COMPLETE W/O BRONCHODILATOR Around 06/26/2018 Echocardiography:  ECHO ADULT COMPLETE Patient Instructions Albuterol 2 puffs every 4 hours as needed if you require albuterol too often to control respiratory symptoms call the office for severe symptoms go to the emergency room Obtain echocardiogram before next visit Introducing [facility] SERVICES! Sophie Handley introduces [employer] patient portal. Now you can access parts of your medical record, email your doctor's office, and request medication refills online. 1. In your internet browser, go to [email or web address] 2. Click on the First Time User? Click Here link in the Sign In box. You will see the New Member Sign Up page. 3. Enter your [employer] Access Code exactly as it appears below.  You will not need to use this code after youve completed the sign-up process. If you do not sign up before the expiration date, you must request a new code. · [employer] Access Code: [email or web address] Expires: 9/24/2018 10:42 AM 
 
4. Enter the last four digits of your Social Security Number (xxxx) and Date of Birth (mm/dd/yyyy) as indicated and click Submit. You will be taken to the next sign-up page. 5. Create a [employer] ID. This will be your [employer] login ID and cannot be changed, so think of one that is secure and easy to remember. 6. Create a [employer] password. You can change your password at any time. 7. Enter your Password Reset Question and Answer. This can be used at a later time if you forget your password. 8. Enter your e-mail address. You will receive e-mail notification when new information is available in [address] 9. Click Sign Up. You can now view and download portions of your medical record. 10. Click the Download Summary menu link to download a portable copy of your medical information. If you have questions, please visit the Frequently Asked Questions section of the [employer] website. Remember, [employer] is NOT to be used for urgent needs. For medical emergencies, dial 911. Now available from your iPhone and Android! Please provide this summary of care documentation to your next provider. Your primary care clinician is listed as RIN PINEDO. If you have any questions after today's visit, please call [phone].

## 2018-06-26 NOTE — PATIENT INSTRUCTIONS
Albuterol 2 puffs every 4 hours as needed if you require albuterol too often to control respiratory symptoms call the office for severe symptoms go to the emergency room    Obtain echocardiogram before next visit

## 2018-06-26 NOTE — LETTER
NOTIFICATION RETURN TO WORK / SCHOOL 
 
6/26/2018 12:08 PM 
 
Ms. Leela Ruiz [address] [address] To Whom It May Concern: 
 
Leela Gómez is currently under the care of [address] [facility]. She was accompanied by her daughter, Anastasiya Prince. If there are questions or concerns please have the patient contact our office. Sincerely, Sunny Valladares MD
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
## 2018-06-26 NOTE — PROGRESS NOTES
Verbal Order with read back per Tristian Voss MD  For PFT smart panel. AMB POC PFT complete w/ bronchodilator  AMB POC PFT complete w/o bronchodilator    Dr. Shivam Barone MD will co-sign the orders.

## 2018-07-12 ENCOUNTER — OFFICE VISIT (OUTPATIENT)
Dept: VASCULAR SURGERY | Age: 68
End: 2018-07-12

## 2018-07-12 VITALS
DIASTOLIC BLOOD PRESSURE: 70 MMHG | HEIGHT: 60 IN | RESPIRATION RATE: 17 BRPM | BODY MASS INDEX: 38.68 KG/M2 | WEIGHT: 197 LBS | HEART RATE: 80 BPM | SYSTOLIC BLOOD PRESSURE: 110 MMHG

## 2018-07-12 DIAGNOSIS — I89.0 LYMPHEDEMA: ICD-10-CM

## 2018-07-12 DIAGNOSIS — E66.01 OBESITY, MORBID (HCC): ICD-10-CM

## 2018-07-12 DIAGNOSIS — L97.911 ULCER OF RIGHT LOWER EXTREMITY, LIMITED TO BREAKDOWN OF SKIN (HCC): ICD-10-CM

## 2018-07-12 DIAGNOSIS — I87.2 VENOUS (PERIPHERAL) INSUFFICIENCY: Primary | ICD-10-CM

## 2018-07-12 NOTE — PROGRESS NOTES
1. Have you been to an emergency room or urgent care clinic since your last visit? May for asthma    Hospitalized since your last visit? If yes, where, when, and reason for visit? no  2. Have you seen or consulted any other health care providers outside of the [facility] since your last visit including any procedures, health maintenance items.  If yes, where, when and reason for visit? no

## 2018-07-12 NOTE — PROGRESS NOTES
Leela Gómez    Chief Complaint   Patient presents with    Swelling       History and Physical    Iris P Kendall Schaumann is a 79 y.o. female with bilateral lower extremity edema and right leg ulcer. She presents today for wound check and follow-up after venous reflux studies. She had been treated with Unna boots and home health care had been coming for wound care. Her right leg ulcer has completely healed at this point. She denies any pain. She denies any fevers or chills. She is out of her UNNA boot at this time and states that she has not obtained compression stockings as \"I could not find them\". Her venous reflux studies were discussed with her in the office today. Studies were negative for DVT. He did not have any significant superficial venous reflux except for in the left small saphenous vein. She did have very mild reflux in the left common femoral vein as well. Past Medical History:   Diagnosis Date    Asthma     Hypertension     Latent syphilis 3/22/2017    Osteoarthritis of ankle     Right ankle pain     posterior tibial tendon interstitial tear    Right foot pain     hindfoot arthrosis    Sleep disorder      Past Surgical History:   Procedure Laterality Date    HX  SECTION       Patient Active Problem List   Diagnosis Code    HTN (hypertension) I10    Hyperlipidemia E78.5    Family history of heart attack, premature, brother 46s Z80.55    Prediabetes R78.1    Family history of diabetes mellitus type II, mother Z80.1    Vitamin D deficiency E55.9    ACP (advance care planning) Z71.89    Latent syphilis A53.0    Obesity, morbid ([facility] E66.01     Current Outpatient Prescriptions   Medication Sig Dispense Refill    Nebulizer Accessories kit To use with nebulizer 1 Kit 0    Nebulizer & Compressor machine 1 Each by Does Not Apply route daily as needed. 1 Each 0    fluticasone (FLOVENT DISKUS) 100 mcg/actuation dsdv Take 100 mcg by inhalation two (2) times a day.  1 Each 0    VENTOLIN HFA 90 mcg/actuation inhaler INHALE 1-2 PUFFS EVERY 4HRS AS NEEDED FOR WHEEZE  0    benzonatate (TESSALON) 100 mg capsule TAKE ONE CAPSULE 3 TIMES A DAY AS NEEDED FOR COUGH SWALLOW WHOLE  0    loratadine (CLARITIN REDITABS) 10 mg dissolvable tablet Take 1 Tab by mouth daily. 30 Tab 0    predniSONE (DELTASONE) 20 mg tablet Take 1 Tab by mouth daily. 3 Tab 0    lisinopril (PRINIVIL, ZESTRIL) 20 mg tablet Take 1 Tab by mouth daily. 90 Tab 1    furosemide (LASIX) 40 mg tablet Take 1 Tab by mouth daily. 30 Tab 3    amoxicillin (AMOXIL) 500 mg capsule Take 500 mg by mouth two (2) times a day.  potassium chloride SR (K-TAB) 20 mEq tablet Take 1 Tab by mouth daily. 30 Tab 3    aspirin delayed-release 81 mg tablet Take  by mouth daily.  acetaminophen (TYLENOL ARTHRITIS) 650 mg CR tablet Take 650 mg by mouth every six (6) hours as needed for Pain. No Known Allergies    Physical Exam:    Visit Vitals    /70 (BP 1 Location: Left arm, BP Patient Position: Sitting)    Pulse 80    Resp 17    Ht 5' (1.524 m)    Wt 197 lb (89.4 kg)    BMI 38.47 kg/m2      General: Well-appearing female in no acute distress . Patient has very flat affect  HEENT: EOMI, no scleral icterus is noted. Pulmonary: No increased work or breathing is noted. Abdomen: obese, nondistended. Extremities: Warm and well perfused bilaterally. Leg edema is much improved on exam today. Her right leg ulcer has completely healed. Neuro: Cranial nerves II through XII are grossly intact   Integument: No ulcerations are identified visibly      Impression and Plan:  Reyes Hernandez is a 79 y.o. female with bilateral lower extremity edema and right leg ulcer which has completely healed at this point. She did have some mild venous reflux on her imaging which was reviewed with her in the office today. I do not feel that she requires any type of surgical intervention for this issue at this point.   I do believe that she also has lymphedema. I discussed the role of compression therapy and leg elevation with her at length. She was given Tubigrip in the office today for gentle compression to hold her over until she can get a good compression stocking. Prescription for compression stockings was given to her in the office today. I did discuss the possibility of obtaining a lymphedema pump for her but she states that she will not use this and refuses pump at this time. Discussed that she can follow-up in our office as needed and to please call if she has any worsening of her leg edema or new ulcers. Plan was discussed. Patient expresses understanding and agrees. Bryson Honeycutt  [phone]        PLEASE NOTE:  This document has been produced using voice recognition software. Unrecognized errors in transcription may be present.

## 2018-07-12 NOTE — MR AVS SNAPSHOT
[address] [place] [address] 
[phone] Patient: Narda Lancaster MRN: [id number] GNE:8/39/0494 Visit Information Date & Time Provider Department Dept. Phone Encounter #  
 7/12/2018  1:45 PM Ernesto ken, [address] [facility] [phone] [id number] Your Appointments 7/23/2018  9:15 AM  
Follow Up with Jasper Jimenez MD  
[address] ([address]) Appt Note: FROM 6/26/18  
 [address] [place] [address], [address] [place] [address] [phone]  
  
   
 [address] [place] [address], [address] [facility],[address] [address]  
  
    
 7/26/2018  2:30 PM  
Follow Up with Yung Hernandez, [address] [facility] (--) Appt Note: fu; RESCHEDULED FROM 06/18/2018; Follow up [address] [id number] [phone]  
  
   
 [address] [id number] Upcoming Health Maintenance Date Due DTaP/Tdap/Td series (1 - Tdap) 1/17/1973 GLAUCOMA SCREENING Q2Y 1/17/2017 Bone Densitometry (Dexa) Screening 1/17/2017 Pneumococcal 65+ Low/Medium Risk (2 of 2 - PPSV23) 2/22/2018 MEDICARE YEARLY EXAM 3/14/2018 Influenza Age 5 to Adult 8/1/2018 BREAST CANCER SCRN MAMMOGRAM 8/8/2019 COLONOSCOPY 8/25/2025 Allergies as of 7/12/2018  Review Complete On: 7/12/2018 By: Desean Watson No Known Allergies Current Immunizations  Reviewed on 11/21/2016 Name Date Influenza High Dose Vaccine PF 11/27/2017 Influenza Vaccine (Quad) PF 9/28/2016 Influenza Vaccine PF 8/26/2014 Pneumococcal Conjugate (PCV-13) 2/22/2017 Zoster Vaccine, Live 12/3/2014 Not reviewed this visit Vitals BP Pulse Resp Height(growth percentile) Weight(growth percentile) BMI  
 110/70 (BP 1 Location: Left arm, BP Patient Position: Sitting) 80 17 5' (1.524 m) 197 lb (89.4 kg) 38.47 kg/m2 OB Status Smoking Status Postmenopausal Never Smoker Vitals History BMI and BSA Data Body Mass Index Body Surface Area  
 38.47 kg/m 2 1.95 m 2 Preferred Pharmacy Pharmacy Name Phone [facility] #[id number]- [address] [place] [phone] Your Updated Medication List  
  
   
This list is accurate as of 7/12/18  2:12 PM.  Always use your most recent med list.  
  
  
  
  
 amoxicillin 500 mg capsule Commonly known as:  AMOXIL Take 500 mg by mouth two (2) times a day. aspirin delayed-release 81 mg tablet Take  by mouth daily. benzonatate 100 mg capsule Commonly known as:  TESSALON  
TAKE ONE CAPSULE 3 TIMES A DAY AS NEEDED FOR COUGH SWALLOW WHOLE  
  
 fluticasone 100 mcg/actuation Dsdv Commonly known as:  FLOVENT DISKUS Take 100 mcg by inhalation two (2) times a day. furosemide 40 mg tablet Commonly known as:  LASIX Take 1 Tab by mouth daily. lisinopril 20 mg tablet Commonly known as:  José Miguel Bridges Take 1 Tab by mouth daily. loratadine 10 mg dissolvable tablet Commonly known as:  Miguelina Cart Take 1 Tab by mouth daily. Nebulizer & Compressor machine 1 Each by Does Not Apply route daily as needed. Nebulizer Accessories Kit To use with nebulizer  
  
 potassium chloride SR 20 mEq tablet Commonly known as:  K-TAB Take 1 Tab by mouth daily. predniSONE 20 mg tablet Commonly known as:  Alinda Bernice Take 1 Tab by mouth daily. TYLENOL ARTHRITIS 650 mg Jose Enriquez Generic drug:  acetaminophen Take 650 mg by mouth every six (6) hours as needed for Pain. VENTOLIN HFA 90 mcg/actuation inhaler Generic drug:  albuterol INHALE 1-2 PUFFS EVERY 4HRS AS NEEDED FOR WHEEZE To-Do List   
 07/25/2018 11:00 AM  
  Appointment with HBV-GE S70 at HBV NON-INVASIVE CARD ([phone]) Age Limit for ALL Heart procedures @ [facility] facilities: 18 yrs and older only. Under the age of 25, refer to [facility] ([phone]). Wt Limit: 350lbs. This study requires patient to bring a written physician's order or MD office may fax the order to Central Scheduling at [phone]. Patient needs to bring a current list of all medications. No preparation is required for this study. Patients should report 15 minutes prior to their appointment time to the [facility], [address] [place] [address]/[address]. Introducing [facility] & HEALTH SERVICES! Lissetteyeni Irby introduces Timeet patient portal. Now you can access parts of your medical record, email your doctor's office, and request medication refills online. 1. In your internet browser, go to [email or web address] Jobsite 2. Click on the First Time User? Click Here link in the Sign In box. You will see the New Member Sign Up page. 3. Enter your Timeet Access Code exactly as it appears below. You will not need to use this code after youve completed the sign-up process. If you do not sign up before the expiration date, you must request a new code. · Timeet Access Code: [email or web address] Expires: 9/24/2018 10:42 AM 
 
4. Enter the last four digits of your Social Security Number (xxxx) and Date of Birth (mm/dd/yyyy) as indicated and click Submit. You will be taken to the next sign-up page. 5. Create a Timeet ID. This will be your Timeet login ID and cannot be changed, so think of one that is secure and easy to remember. 6. Create a Timeet password. You can change your password at any time. 7. Enter your Password Reset Question and Answer. This can be used at a later time if you forget your password. 8. Enter your e-mail address. You will receive e-mail notification when new information is available in [address] 9. Click Sign Up. You can now view and download portions of your medical record. 10. Click the Download Summary menu link to download a portable copy of your medical information. If you have questions, please visit the Frequently Asked Questions section of the [employer] website. Remember, [employer] is NOT to be used for urgent needs. For medical emergencies, dial 911. Now available from your iPhone and Android! Please provide this summary of care documentation to your next provider. Your primary care clinician is listed as RIN PINEDO. If you have any questions after today's visit, please call [phone].

## 2018-08-08 ENCOUNTER — OFFICE VISIT (OUTPATIENT)
Dept: FAMILY MEDICINE CLINIC | Age: 68
End: 2018-08-08

## 2018-08-08 VITALS
RESPIRATION RATE: 16 BRPM | TEMPERATURE: 97.1 F | SYSTOLIC BLOOD PRESSURE: 113 MMHG | HEIGHT: 60 IN | WEIGHT: 202 LBS | DIASTOLIC BLOOD PRESSURE: 78 MMHG | HEART RATE: 67 BPM | OXYGEN SATURATION: 98 % | BODY MASS INDEX: 39.66 KG/M2

## 2018-08-08 DIAGNOSIS — I10 ESSENTIAL HYPERTENSION: ICD-10-CM

## 2018-08-08 DIAGNOSIS — E78.5 HYPERLIPIDEMIA, UNSPECIFIED HYPERLIPIDEMIA TYPE: ICD-10-CM

## 2018-08-08 DIAGNOSIS — J45.909 UNCOMPLICATED ASTHMA, UNSPECIFIED ASTHMA SEVERITY, UNSPECIFIED WHETHER PERSISTENT: Primary | ICD-10-CM

## 2018-08-08 DIAGNOSIS — R73.03 PREDIABETES: ICD-10-CM

## 2018-08-08 DIAGNOSIS — E74.89 OTHER SPECIFIED DISORDERS OF CARBOHYDRATE METABOLISM (HCC): ICD-10-CM

## 2018-08-08 NOTE — MR AVS SNAPSHOT
[address] [facility] 
 
 
 [address] [place] [address] 
[phone] Patient: Catrachito Glass MRN: [id number] [facility]:9[birth year] Visit Information Date & Time Provider Department Dept. Phone Encounter #  
 8/8/2018 10:00 AM Adama Hayes NP [facility] [address] [id number] Follow-up Instructions Return in about 2 months (around 10/8/2018), or if symptoms worsen or fail to improve, for 30 min follow up. Upcoming Health Maintenance Date Due DTaP/Tdap/Td series (1 - Tdap) 1/17/1973 GLAUCOMA SCREENING Q2Y 1/17/2017 Bone Densitometry (Dexa) Screening 1/17/2017 Pneumococcal 65+ Low/Medium Risk (2 of 2 - PPSV23) 2/22/2018 MEDICARE YEARLY EXAM 3/14/2018 Influenza Age 5 to Adult 8/1/2018 BREAST CANCER SCRN MAMMOGRAM 8/8/2019 COLONOSCOPY 8/25/2025 Allergies as of 8/8/2018  Review Complete On: 8/8/2018 By: Adama Hayes NP No Known Allergies Current Immunizations  Reviewed on 11/21/2016 Name Date Influenza High Dose Vaccine PF 11/27/2017 Influenza Vaccine (Quad) PF 9/28/2016 Influenza Vaccine PF 8/26/2014 Pneumococcal Conjugate (PCV-13) 2/22/2017 Zoster Vaccine, Live 12/3/2014 Not reviewed this visit You Were Diagnosed With   
  
 Codes Comments Uncomplicated asthma, unspecified asthma severity, unspecified whether persistent    -  Primary ICD-10-CM: J45.909 ICD-9-CM: 493.90 Essential hypertension     ICD-10-CM: I10 
ICD-9-CM: 401.9 Hyperlipidemia, unspecified hyperlipidemia type     ICD-10-CM: E78.5 ICD-9-CM: 272.4 Prediabetes     ICD-10-CM: R73.03 
ICD-9-CM: 790.29 Other specified disorders of carbohydrate [facility] ([facility]     ICD-10-CM: D81.9 ICD-9-CM: 271.9 Vitals BP Pulse Temp Resp Height(growth percentile) Weight(growth percentile)  113/78 (BP 1 Location: Right arm, BP Patient Position: Sitting) 67 97.1 °F (36.2 °C) (Oral) 16 5' (1.524 m) 202 lb (91.6 kg) SpO2 BMI OB Status Smoking Status 98% 39.45 kg/m2 Postmenopausal Never Smoker BMI and BSA Data Body Mass Index Body Surface Area  
 39.45 kg/m 2 1.97 m 2 Preferred Pharmacy Pharmacy Name Phone [facility]/PHARMACY #[id number]- [place], [address] [place]ie Zeng [phone] Your Updated Medication List  
  
   
This list is accurate as of 8/8/18 10:42 AM.  Always use your most recent med list.  
  
  
  
  
 amoxicillin 500 mg capsule Commonly known as:  AMOXIL Take 500 mg by mouth two (2) times a day. aspirin delayed-release 81 mg tablet Take  by mouth daily. benzonatate 100 mg capsule Commonly known as:  TESSALON  
TAKE ONE CAPSULE 3 TIMES A DAY AS NEEDED FOR COUGH SWALLOW WHOLE  
  
 FLOVENT DISKUS 100 mcg/actuation Dsdv Generic drug:  fluticasone INHALE 1 PUFF BY MOUTH TWICE DAILY  
  
 furosemide 40 mg tablet Commonly known as:  LASIX Take 1 Tab by mouth daily. lisinopril 20 mg tablet Commonly known as:  Aicha Tim Take 1 Tab by mouth daily. loratadine 10 mg dissolvable tablet Commonly known as:  Syeda Mccord Take 1 Tab by mouth daily. Nebulizer & Compressor machine 1 Each by Does Not Apply route daily as needed. Nebulizer Accessories Kit To use with nebulizer  
  
 potassium chloride SR 20 mEq tablet Commonly known as:  K-TAB Take 1 Tab by mouth daily. predniSONE 20 mg tablet Commonly known as:  Belia Pound Take 1 Tab by mouth daily. TYLENOL ARTHRITIS 650 mg Kathy Glass Generic drug:  acetaminophen Take 650 mg by mouth every six (6) hours as needed for Pain. VENTOLIN HFA 90 mcg/actuation inhaler Generic drug:  albuterol INHALE 1-2 PUFFS EVERY 4HRS AS NEEDED FOR WHEEZE Follow-up Instructions Return in about 2 months (around 10/8/2018), or if symptoms worsen or fail to improve, for 30 min follow up. To-Do List   
 08/08/2018 Lab:  CBC WITH AUTOMATED DIFF   
  
 08/08/2018 Lab:  HEMOGLOBIN A1C WITH EAG Around 11/06/2018 Lab:  LIPID PANEL Around 11/06/2018 Lab:  METABOLIC PANEL, COMPREHENSIVE Patient Instructions Please contact our office if you have any questions about your visit today. Introducing [facility] & HEALTH SERVICES! [employer] introduces LeadPoint patient portal. Now you can access parts of your medical record, email your doctor's office, and request medication refills online. 1. In your internet browser, go to [email or web address] 2. Click on the First Time User? Click Here link in the Sign In box. You will see the New Member Sign Up page. 3. Enter your LeadPoint Access Code exactly as it appears below. You will not need to use this code after youve completed the sign-up process. If you do not sign up before the expiration date, you must request a new code. · LeadPoint Access Code: [email or web address] Expires: 9/24/2018 10:42 AM 
 
4. Enter the last four digits of your Social Security Number (xxxx) and Date of Birth (mm/dd/yyyy) as indicated and click Submit. You will be taken to the next sign-up page. 5. Create a LeadPoint ID. This will be your LeadPoint login ID and cannot be changed, so think of one that is secure and easy to remember. 6. Create a LeadPoint password. You can change your password at any time. 7. Enter your Password Reset Question and Answer. This can be used at a later time if you forget your password. 8. Enter your e-mail address. You will receive e-mail notification when new information is available in [address] 9. Click Sign Up. You can now view and download portions of your medical record. 10. Click the Download Summary menu link to download a portable copy of your medical information.  
 
If you have questions, please visit the Frequently Asked Questions section of the [employer]. Remember, [employer] is NOT to be used for urgent needs. For medical emergencies, dial 911. Now available from your iPhone and Android! Please provide this summary of care documentation to your next provider. Your primary care clinician is listed as RIN PINEDO. If you have any questions after today's visit, please call [phone].

## 2018-08-08 NOTE — PROGRESS NOTES
HPI  Maria Alejandra Alexander is a 79 y.o. female  Chief Complaint   Patient presents with    Follow-up     Patient last seen in office 2018     Reports she is feeling much better. Denies wheezing and or shortness of breath. Reports she is breathing much better. Denies using inhaler. Reports she did see the pulmonologist. Reports she did not complete her follow up appointments as she felt better. Ja cough or sputum production. Admits to taking her blood pressure medications. Denies chest pain or shortness of breath. Reports she is not taking anything for her cholesterol. States she still has swelling in her legs. Reports her leg wound has healed. Past Medical History  Past Medical History:   Diagnosis Date    Asthma     Hypertension     Latent syphilis 3/22/2017    Osteoarthritis of ankle     Right ankle pain     posterior tibial tendon interstitial tear    Right foot pain     hindfoot arthrosis    Sleep disorder        Surgical History  Past Surgical History:   Procedure Laterality Date    HX  SECTION          Medications  Current Outpatient Prescriptions   Medication Sig Dispense Refill    lisinopril (PRINIVIL, ZESTRIL) 20 mg tablet Take 1 Tab by mouth daily. 90 Tab 1    furosemide (LASIX) 40 mg tablet Take 1 Tab by mouth daily. 30 Tab 3    aspirin delayed-release 81 mg tablet Take  by mouth daily.  acetaminophen (TYLENOL ARTHRITIS) 650 mg CR tablet Take 650 mg by mouth every six (6) hours as needed for Pain.  FLOVENT DISKUS 100 mcg/actuation dsdv INHALE 1 PUFF BY MOUTH TWICE DAILY 1 Each 2    Nebulizer Accessories kit To use with nebulizer 1 Kit 0    Nebulizer & Compressor machine 1 Each by Does Not Apply route daily as needed.  1 Each 0    VENTOLIN HFA 90 mcg/actuation inhaler INHALE 1-2 PUFFS EVERY 4HRS AS NEEDED FOR WHEEZE  0    benzonatate (TESSALON) 100 mg capsule TAKE ONE CAPSULE 3 TIMES A DAY AS NEEDED FOR COUGH SWALLOW WHOLE  0    loratadine (CLARITIN REDITABS) 10 mg dissolvable tablet Take 1 Tab by mouth daily. 30 Tab 0    predniSONE (DELTASONE) 20 mg tablet Take 1 Tab by mouth daily. 3 Tab 0    amoxicillin (AMOXIL) 500 mg capsule Take 500 mg by mouth two (2) times a day.  potassium chloride SR (K-TAB) 20 mEq tablet Take 1 Tab by mouth daily. 27 Tab 3       Allergies  No Known Allergies    Family History  Family History   Problem Relation Age of Onset    Diabetes Mother     Hypertension Mother     Heart Disease Father     Hypertension Father     Gout Father     Heart Attack Brother     Schizophrenia Brother        Social History  Social History     Social History    Marital status:      Spouse name: N/A    Number of children: N/A    Years of education: N/A     Occupational History    Not on file. Social History Main Topics    Smoking status: Never Smoker    Smokeless tobacco: Never Used    Alcohol use No    Drug use: No    Sexual activity: No     Other Topics Concern    Not on file     Social History Narrative       Problem List  Patient Active Problem List   Diagnosis Code    HTN (hypertension) I10    Hyperlipidemia E78.5    Family history of heart attack, premature, brother 46s Z80.55    Prediabetes R78.1    Family history of diabetes mellitus type II, mother Z80.1    Vitamin D deficiency E55.9    ACP (advance care planning) Z71.89    Latent syphilis A53.0    Obesity, morbid ([address] E66.01       Review of Systems  Review of Systems   HENT: Negative for sore throat. Respiratory: Negative for cough, sputum production, shortness of breath and wheezing. Cardiovascular: Positive for leg swelling. Negative for chest pain and palpitations. Neurological: Negative for dizziness.        Vital Signs  Vitals:    08/08/18 1020   BP: 113/78   Pulse: 67   Resp: 16   Temp: 97.1 °F (36.2 °C)   TempSrc: Oral   SpO2: 98%   Weight: 202 lb (91.6 kg)   Height: 5' (1.524 m)   PainSc:   0 - No pain       Physical Exam  Physical Exam Constitutional: She is oriented to person, place, and time. HENT:   Mouth/Throat: Oropharynx is clear and moist.   Cardiovascular: Normal rate, regular rhythm and normal heart sounds. Pulmonary/Chest: Effort normal and breath sounds normal. No respiratory distress. Musculoskeletal: She exhibits edema (lower extremities). Neurological: She is alert and oriented to person, place, and time. Psychiatric: She has a normal mood and affect. Her behavior is normal.       Diagnostics  Orders Placed This Encounter    CBC WITH AUTOMATED DIFF     Standing Status:   Future     Standing Expiration Date:   3/6/2234    METABOLIC PANEL, COMPREHENSIVE     Standing Status:   Future     Standing Expiration Date:   2/5/2019    HEMOGLOBIN A1C WITH EAG     Standing Status:   Future     Standing Expiration Date:   8/9/2019    LIPID PANEL     Standing Status:   Future     Standing Expiration Date:   2/5/2019       Results  Results for orders placed or performed in visit on 07/12/18   DUPLEX LOWER EXT VENOUS BILAT   Result Value Ref Range    Right GSV Junc Rfx 0.53 s    Left GSV Junc Rfx 0.49 s    Left SSV Dist Rfx 3.68 s    Left SSV Mid Rfx 1.83 s    Right GSV Junc Diam 0.74 cm    Right GSV Thigh Dist Diam 0.22 cm    Right GSV Thigh Prox Diam 0.27 cm    Right GSV at Knee Diam 0.18 cm    Right SSV Prox Diam 0.17 cm    Left GSV Junc Diam 0.64 cm    Left GSV Thigh Dist Diam 0.28 cm    Left GSV Thigh Prox Diam 0.34 cm    Left GSV at Knee Diam 0.24 cm    Left SSV Dist Diam 0.18 cm    Left SSV Mid Diam 0.28 cm    Left SSV Prox Diam 0.27 cm    Left GSV BK Prox Diam 0.20 cm    Left SSV junction diameter 0.29 cm    Left CFV Rfx 1.1 s         Assessment and Plan  Diagnoses and all orders for this visit:    1. Uncomplicated asthma, unspecified asthma severity, unspecified whether persistent    2. Essential hypertension  -     CBC WITH AUTOMATED DIFF; Future  -     METABOLIC PANEL, COMPREHENSIVE;  Future  -     HEMOGLOBIN A1C WITH EAG; Future    3. Hyperlipidemia, unspecified hyperlipidemia type  -     CBC WITH AUTOMATED DIFF; Future  -     METABOLIC PANEL, COMPREHENSIVE; Future  -     HEMOGLOBIN A1C WITH EAG; Future  -     LIPID PANEL; Future    4. Prediabetes  -     CBC WITH AUTOMATED DIFF; Future  -     METABOLIC PANEL, COMPREHENSIVE; Future  -     HEMOGLOBIN A1C WITH EAG; Future    5. Other specified disorders of carbohydrate metabolism ([facility]   -     HEMOGLOBIN A1C WITH EAG; Future    Instructed to have fasting labs prior to next visit. After care summary printed and reviewed with patient. Plan reviewed with patient. Questions answered. Patient verbalized understanding of plan and is in agreement with plan. Patient to follow up in two months or earlier if symptoms worsen.      ABRAM Aranda-C

## 2018-08-08 NOTE — PROGRESS NOTES
Chief Complaint   Patient presents with    Follow-up     Patient last seen in office 5/24/2018     1. Have you been to the ER, urgent care clinic since your last visit? Hospitalized since your last visit? Yes When: 06/2018 Where: [facility] Reason for visit: Asthma    2. Have you seen or consulted any other health care providers outside of the [address] [place] [address] since your last visit? Include any pap smears or colon screening.  No     Health Maintenance Due   Topic Date Due    DTaP/Tdap/Td series (1 - Tdap) 01/17/1973    GLAUCOMA SCREENING Q2Y  01/17/2017    Bone Densitometry (Dexa) Screening  01/17/2017    Pneumococcal 65+ Low/Medium Risk (2 of 2 - PPSV23) 02/22/2018    MEDICARE YEARLY EXAM  03/14/2018    Influenza Age 9 to Adult  08/01/2018     Abuse screening updated 8/8/2018

## 2018-12-27 ENCOUNTER — OFFICE VISIT (OUTPATIENT)
Dept: FAMILY MEDICINE CLINIC | Age: 68
End: 2018-12-27

## 2018-12-27 VITALS
HEIGHT: 60 IN | TEMPERATURE: 98.1 F | BODY MASS INDEX: 40.64 KG/M2 | DIASTOLIC BLOOD PRESSURE: 89 MMHG | SYSTOLIC BLOOD PRESSURE: 153 MMHG | WEIGHT: 207 LBS | RESPIRATION RATE: 18 BRPM | HEART RATE: 81 BPM

## 2018-12-27 DIAGNOSIS — J45.909 UNCOMPLICATED ASTHMA, UNSPECIFIED ASTHMA SEVERITY, UNSPECIFIED WHETHER PERSISTENT: ICD-10-CM

## 2018-12-27 DIAGNOSIS — R73.03 PREDIABETES: ICD-10-CM

## 2018-12-27 DIAGNOSIS — Z71.2 ENCOUNTER TO DISCUSS TEST RESULTS: ICD-10-CM

## 2018-12-27 DIAGNOSIS — E78.5 HYPERLIPIDEMIA, UNSPECIFIED HYPERLIPIDEMIA TYPE: ICD-10-CM

## 2018-12-27 DIAGNOSIS — I10 ESSENTIAL HYPERTENSION: Primary | ICD-10-CM

## 2018-12-27 LAB
ALBUMIN SERPL-MCNC: 3.9 G/DL (ref 3.6–4.8)
ALBUMIN/GLOB SERPL: 1.3 {RATIO} (ref 1.2–2.2)
ALP SERPL-CCNC: 110 IU/L (ref 39–117)
ALT SERPL-CCNC: 11 IU/L (ref 0–32)
AST SERPL-CCNC: 14 IU/L (ref 0–40)
BASOPHILS # BLD AUTO: 0 X10E3/UL (ref 0–0.2)
BASOPHILS NFR BLD AUTO: 0 %
BILIRUB SERPL-MCNC: <0.2 MG/DL (ref 0–1.2)
BUN SERPL-MCNC: 14 MG/DL (ref 8–27)
BUN/CREAT SERPL: 14 (ref 12–28)
CALCIUM SERPL-MCNC: 9 MG/DL (ref 8.7–10.3)
CHLORIDE SERPL-SCNC: 108 MMOL/L (ref 96–106)
CHOLEST SERPL-MCNC: 238 MG/DL (ref 100–199)
CO2 SERPL-SCNC: 23 MMOL/L (ref 20–29)
CREAT SERPL-MCNC: 1 MG/DL (ref 0.57–1)
EOSINOPHIL # BLD AUTO: 0.1 X10E3/UL (ref 0–0.4)
EOSINOPHIL NFR BLD AUTO: 3 %
ERYTHROCYTE [DISTWIDTH] IN BLOOD BY AUTOMATED COUNT: 16.8 % (ref 12.3–15.4)
EST. AVERAGE GLUCOSE BLD GHB EST-MCNC: 126 MG/DL
GLOBULIN SER CALC-MCNC: 3.1 G/DL (ref 1.5–4.5)
GLUCOSE SERPL-MCNC: 91 MG/DL (ref 65–99)
HBA1C MFR BLD: 6 % (ref 4.8–5.6)
HCT VFR BLD AUTO: 36.5 % (ref 34–46.6)
HDLC SERPL-MCNC: 76 MG/DL
HGB BLD-MCNC: 11.2 G/DL (ref 11.1–15.9)
IMM GRANULOCYTES # BLD: 0 X10E3/UL (ref 0–0.1)
IMM GRANULOCYTES NFR BLD: 0 %
INTERPRETATION, 910389: NORMAL
INTERPRETATION: NORMAL
LDLC SERPL CALC-MCNC: 150 MG/DL (ref 0–99)
LYMPHOCYTES # BLD AUTO: 1.5 X10E3/UL (ref 0.7–3.1)
LYMPHOCYTES NFR BLD AUTO: 35 %
MCH RBC QN AUTO: 22.2 PG (ref 26.6–33)
MCHC RBC AUTO-ENTMCNC: 30.7 G/DL (ref 31.5–35.7)
MCV RBC AUTO: 72 FL (ref 79–97)
MONOCYTES # BLD AUTO: 0.4 X10E3/UL (ref 0.1–0.9)
MONOCYTES NFR BLD AUTO: 10 %
NEUTROPHILS # BLD AUTO: 2.3 X10E3/UL (ref 1.4–7)
NEUTROPHILS NFR BLD AUTO: 52 %
PDF IMAGE, 910387: NORMAL
PLATELET # BLD AUTO: 199 X10E3/UL (ref 150–379)
POTASSIUM SERPL-SCNC: 4.1 MMOL/L (ref 3.5–5.2)
PROT SERPL-MCNC: 7 G/DL (ref 6–8.5)
RBC # BLD AUTO: 5.05 X10E6/UL (ref 3.77–5.28)
SODIUM SERPL-SCNC: 142 MMOL/L (ref 134–144)
TRIGL SERPL-MCNC: 58 MG/DL (ref 0–149)
VLDLC SERPL CALC-MCNC: 12 MG/DL (ref 5–40)
WBC # BLD AUTO: 4.4 X10E3/UL (ref 3.4–10.8)

## 2018-12-27 NOTE — PROGRESS NOTES
HPI  Yolis Grajeda is a 79 y.o. female  Chief Complaint   Patient presents with    Hypertension     follow up    Cholesterol Problem    Vitamin D Deficiency     Hypertension and cholesterol - Denies chest pain, shortness of breath, palpitations, dizziness, or blurred vision. Denies leg swelling. Reports taking medications as prescribed. Asthma - reports her symptoms have improved. Denies having any recent asthma flares. Prediabetes - requesting her lab results. Admits to not exercising. Reports she eats sweets and carbohydrates. Past Medical History  Past Medical History:   Diagnosis Date    Asthma     Hypertension     Latent syphilis 3/22/2017    Osteoarthritis of ankle     Right ankle pain     posterior tibial tendon interstitial tear    Right foot pain     hindfoot arthrosis    Sleep disorder        Surgical History  Past Surgical History:   Procedure Laterality Date    HX  SECTION          Medications  Current Outpatient Medications   Medication Sig Dispense Refill    lisinopril (PRINIVIL, ZESTRIL) 20 mg tablet Take 1 Tab by mouth daily. 90 Tab 1    furosemide (LASIX) 40 mg tablet Take 1 Tab by mouth daily. 30 Tab 3    aspirin delayed-release 81 mg tablet Take  by mouth daily.  acetaminophen (TYLENOL ARTHRITIS) 650 mg CR tablet Take 650 mg by mouth every six (6) hours as needed for Pain.  FLOVENT DISKUS 100 mcg/actuation dsdv INHALE 1 PUFF BY MOUTH TWICE DAILY 1 Each 2    Nebulizer Accessories kit To use with nebulizer 1 Kit 0    Nebulizer & Compressor machine 1 Each by Does Not Apply route daily as needed. 1 Each 0    VENTOLIN HFA 90 mcg/actuation inhaler INHALE 1-2 PUFFS EVERY 4HRS AS NEEDED FOR WHEEZE  0    benzonatate (TESSALON) 100 mg capsule TAKE ONE CAPSULE 3 TIMES A DAY AS NEEDED FOR COUGH SWALLOW WHOLE  0    loratadine (CLARITIN REDITABS) 10 mg dissolvable tablet Take 1 Tab by mouth daily.  30 Tab 0    predniSONE (DELTASONE) 20 mg tablet Take 1 Tab by mouth daily. 3 Tab 0    amoxicillin (AMOXIL) 500 mg capsule Take 500 mg by mouth two (2) times a day.  potassium chloride SR (K-TAB) 20 mEq tablet Take 1 Tab by mouth daily. 27 Tab 3       Allergies  No Known Allergies    Family History  Family History   Problem Relation Age of Onset    Diabetes Mother     Hypertension Mother     Heart Disease Father     Hypertension Father     Gout Father     Heart Attack Brother     Schizophrenia Brother        Social History  Social History     Socioeconomic History    Marital status:      Spouse name: Not on file    Number of children: Not on file    Years of education: Not on file    Highest education level: Not on file   Social Needs    Financial resource strain: Not on file    Food insecurity - worry: Not on file    Food insecurity - inability: Not on file   [employer] needs - medical: Not on file   [employer] needs - non-medical: Not on file   Occupational History    Not on file   Tobacco Use    Smoking status: Never Smoker    Smokeless tobacco: Never Used   Substance and Sexual Activity    Alcohol use: No    Drug use: No    Sexual activity: No   Other Topics Concern    Not on file   Social History Narrative    Not on file       Problem List  Patient Active Problem List   Diagnosis Code    HTN (hypertension) I10    Hyperlipidemia E78.5    Family history of heart attack, premature, brother 46s Z80.55    Prediabetes R73.03    Family history of diabetes mellitus type II, mother Z80.1    Vitamin D deficiency E55.9    ACP (advance care planning) Z71.89    Latent syphilis A53.0    Obesity, morbid ([address] E66.01       Review of Systems  Review of Systems   Constitutional: Negative for chills and fever. Respiratory: Negative for shortness of breath. Cardiovascular: Negative for chest pain and palpitations. Gastrointestinal: Negative for nausea and vomiting. Neurological: Negative for dizziness.        Vital Signs  Vitals:    12/27/18 1205   BP: 153/89   Pulse: 81   Resp: 18   Temp: 98.1 °F (36.7 °C)   TempSrc: Oral   Weight: 207 lb (93.9 kg)   Height: 5' (1.524 m)   PainSc:   0 - No pain       Physical Exam  Physical Exam   Constitutional: She is oriented to person, place, and time. HENT:   Mouth/Throat: Oropharynx is clear and moist.   Eyes: Pupils are equal, round, and reactive to light. Cardiovascular: Normal rate, regular rhythm, normal heart sounds and intact distal pulses. Pulmonary/Chest: Effort normal and breath sounds normal. No respiratory distress. Abdominal: Soft. Bowel sounds are normal. She exhibits no distension. There is no tenderness. Musculoskeletal: She exhibits edema. Swelling +2 in legs bilateral    Neurological: She is alert and oriented to person, place, and time. Coordination normal.   Skin: Skin is warm and dry. Psychiatric: She has a normal mood and affect. Her behavior is normal.   Vitals reviewed. Diagnostics  No orders of the defined types were placed in this encounter. Results  Results for orders placed or performed in visit on 08/08/18   CBC WITH AUTOMATED DIFF   Result Value Ref Range    WBC 4.4 3.4 - 10.8 x10E3/uL    RBC 5.05 3.77 - 5.28 x10E6/uL    HGB 11.2 11.1 - 15.9 g/dL    HCT 36.5 34.0 - 46.6 %    MCV 72 (L) 79 - 97 fL    MCH 22.2 (L) 26.6 - 33.0 pg    MCHC 30.7 (L) 31.5 - 35.7 g/dL    RDW 16.8 (H) 12.3 - 15.4 %    PLATELET 561 468 - 424 x10E3/uL    NEUTROPHILS 52 Not Estab. %    Lymphocytes 35 Not Estab. %    MONOCYTES 10 Not Estab. %    EOSINOPHILS 3 Not Estab. %    BASOPHILS 0 Not Estab. %    ABS. NEUTROPHILS 2.3 1.4 - 7.0 x10E3/uL    Abs Lymphocytes 1.5 0.7 - 3.1 x10E3/uL    ABS. MONOCYTES 0.4 0.1 - 0.9 x10E3/uL    ABS. EOSINOPHILS 0.1 0.0 - 0.4 x10E3/uL    ABS. BASOPHILS 0.0 0.0 - 0.2 x10E3/uL    IMMATURE GRANULOCYTES 0 Not Estab. %    ABS. IMM.  GRANS. 0.0 0.0 - 0.1 O33C2/JG   METABOLIC PANEL, COMPREHENSIVE   Result Value Ref Range    Glucose 91 65 - 99 mg/dL    BUN 14 8 - 27 mg/dL    Creatinine 1.00 0.57 - 1.00 mg/dL    GFR est non-AA 59 (L) >59 mL/min/1.73    GFR est AA 68 >59 mL/min/1.73    BUN/Creatinine ratio 14 12 - 28    Sodium 142 134 - 144 mmol/L    Potassium 4.1 3.5 - 5.2 mmol/L    Chloride 108 (H) 96 - 106 mmol/L    CO2 23 20 - 29 mmol/L    Calcium 9.0 8.7 - 10.3 mg/dL    Protein, total 7.0 6.0 - 8.5 g/dL    Albumin 3.9 3.6 - 4.8 g/dL    GLOBULIN, TOTAL 3.1 1.5 - 4.5 g/dL    A-G Ratio 1.3 1.2 - 2.2    Bilirubin, total <0.2 0.0 - 1.2 mg/dL    Alk. phosphatase 110 39 - 117 IU/L    AST (SGOT) 14 0 - 40 IU/L    ALT (SGPT) 11 0 - 32 IU/L   LIPID PANEL   Result Value Ref Range    Cholesterol, total 238 (H) 100 - 199 mg/dL    Triglyceride 58 0 - 149 mg/dL    HDL Cholesterol 76 >39 mg/dL    VLDL, calculated 12 5 - 40 mg/dL    LDL, calculated 150 (H) 0 - 99 mg/dL   CVD REPORT   Result Value Ref Range    INTERPRETATION Note     PDF IMAGE Not applicable    CKD REPORT   Result Value Ref Range    Interpretation Note    HEMOGLOBIN A1C WITH EAG   Result Value Ref Range    Hemoglobin A1c 6.0 (H) 4.8 - 5.6 %    Estimated average glucose 126 mg/dL       Assessment and Plan  Diagnoses and all orders for this visit:    1. Essential hypertension    2. Uncomplicated asthma, unspecified asthma severity, unspecified whether persistent    3. Hyperlipidemia, unspecified hyperlipidemia type    4. Prediabetes    5. BMI 40.0-44.9, adult ([address]    6. Encounter to discuss test results      Discussed diet and exercise with patient in light of prediabetes, weight,  and hypertension. Lab results discussed. After care summary printed and reviewed with patient. Plan reviewed with patient. Questions answered. Patient verbalized understanding of plan and is in agreement with plan. Patient to follow up in one month for blood pressure or earlier if symptoms worsen. She is aware that we will adjust her blood pressure medications at her next visit if her blood pressure remains elevated. MARTHA Nieto  Discussed the patient's BMI with her. The BMI follow up plan is as follows:     dietary management education, guidance, and counseling  encourage exercise  monitor weight  prescribed dietary intake    An After Visit Summary was printed and given to the patient.     MARTHA Nieto

## 2018-12-27 NOTE — PROGRESS NOTES
Lauren Hernandez presents today for   Chief Complaint   Patient presents with    Hypertension     follow up    Cholesterol Problem    Vitamin D Deficiency       Is someone accompanying this pt? no    Is the patient using any DME equipment during OV? no    Depression Screening:  PHQ over the last two weeks 7/12/2018   Little interest or pleasure in doing things Not at all   Feeling down, depressed, irritable, or hopeless Not at all   Total Score PHQ 2 0       Learning Assessment:  Learning Assessment 12/27/2018   PRIMARY LEARNER Patient   HIGHEST LEVEL OF EDUCATION - PRIMARY LEARNER  SOME COLLEGE   BARRIERS PRIMARY LEARNER NONE   CO-LEARNER CAREGIVER No   PRIMARY LANGUAGE ENGLISH   LEARNER PREFERENCE PRIMARY LISTENING     -     -   ANSWERED BY self   RELATIONSHIP SELF       Abuse Screening:  Abuse Screening Questionnaire 8/8/2018   Do you ever feel afraid of your partner? N   Are you in a relationship with someone who physically or mentally threatens you? N   Is it safe for you to go home? Y           Health Maintenance Due   Topic Date Due    DTaP/Tdap/Td series (1 - Tdap) 01/17/1973    Shingrix Vaccine Age 50> (1 of 2) 01/17/2002    GLAUCOMA SCREENING Q2Y  01/17/2017    Bone Densitometry (Dexa) Screening  01/17/2017    Pneumococcal 65+ Low/Medium Risk (2 of 2 - PPSV23) 02/22/2018    MEDICARE YEARLY EXAM  03/14/2018    Influenza Age 9 to Adult  08/01/2018   . Health Maintenance reviewed and discussed and ordered per Provider. Leela Gómez is updated on all       Coordination of Care:  1. Have you been to the ER, urgent care clinic since your last visit? Hospitalized since your last visit? no    2. Have you seen or consulted any other health care providers outside of the [address] [place] [address] since your last visit? Include any pap smears or colon screening. no    Per-NP Johana murray medication not taking to be deleted. Advance Directive:  1. Do you have an advance directive in place?  Patient Reply:no    2. If not, would you like material regarding how to put one in place?  Patient Reply: no

## 2018-12-27 NOTE — PATIENT INSTRUCTIONS
High Blood Pressure: Care Instructions  Your Care Instructions    If your blood pressure is usually above 130/80, you have high blood pressure, or hypertension. That means the top number is 130 or higher or the bottom number is 80 or higher, or both. Despite what a lot of people think, high blood pressure usually doesn't cause headaches or make you feel dizzy or lightheaded. It usually has no symptoms. But it does increase your risk for heart attack, stroke, and kidney or eye damage. The higher your blood pressure, the more your risk increases. Your doctor will give you a goal for your blood pressure. Your goal will be based on your health and your age. Lifestyle changes, such as eating healthy and being active, are always important to help lower blood pressure. You might also take medicine to reach your blood pressure goal.  Follow-up care is a key part of your treatment and safety. Be sure to make and go to all appointments, and call your doctor if you are having problems. It's also a good idea to know your test results and keep a list of the medicines you take. How can you care for yourself at home? Medical treatment  · If you stop taking your medicine, your blood pressure will go back up. You may take one or more types of medicine to lower your blood pressure. Be safe with medicines. Take your medicine exactly as prescribed. Call your doctor if you think you are having a problem with your medicine. · Talk to your doctor before you start taking aspirin every day. Aspirin can help certain people lower their risk of a heart attack or stroke. But taking aspirin isn't right for everyone, because it can cause serious bleeding. · See your doctor regularly. You may need to see the doctor more often at first or until your blood pressure comes down. · If you are taking blood pressure medicine, talk to your doctor before you take decongestants or anti-inflammatory medicine, such as ibuprofen.  Some of these medicines can raise blood pressure. · Learn how to check your blood pressure at home. Lifestyle changes  · Stay at a healthy weight. This is especially important if you put on weight around the waist. Losing even 10 pounds can help you lower your blood pressure. · If your doctor recommends it, get more exercise. Walking is a good choice. Bit by bit, increase the amount you walk every day. Try for at least 30 minutes on most days of the week. You also may want to swim, bike, or do other activities. · Avoid or limit alcohol. Talk to your doctor about whether you can drink any alcohol. · Try to limit how much sodium you eat to less than 2,300 milligrams (mg) a day. Your doctor may ask you to try to eat less than 1,500 mg a day. · Eat plenty of fruits (such as bananas and oranges), vegetables, legumes, whole grains, and low-fat dairy products. · Lower the amount of saturated fat in your diet. Saturated fat is found in animal products such as milk, cheese, and meat. Limiting these foods may help you lose weight and also lower your risk for heart disease. · Do not smoke. Smoking increases your risk for heart attack and stroke. If you need help quitting, talk to your doctor about stop-smoking programs and medicines. These can increase your chances of quitting for good. When should you call for help? Call 911 anytime you think you may need emergency care. This may mean having symptoms that suggest that your blood pressure is causing a serious heart or blood vessel problem. Your blood pressure may be over 180/120.   For example, call 911 if:    · You have symptoms of a heart attack. These may include:  ? Chest pain or pressure, or a strange feeling in the chest.  ? Sweating. ? Shortness of breath. ? Nausea or vomiting. ? Pain, pressure, or a strange feeling in the back, neck, jaw, or upper belly or in one or both shoulders or arms. ? Lightheadedness or sudden weakness.   ? A fast or irregular heartbeat.     · You have symptoms of a stroke. These may include:  ? Sudden numbness, tingling, weakness, or loss of movement in your face, arm, or leg, especially on only one side of your body. ? Sudden vision changes. ? Sudden trouble speaking. ? Sudden confusion or trouble understanding simple statements. ? Sudden problems with walking or balance. ? A sudden, severe headache that is different from past headaches.     · You have severe back or belly pain.    Do not wait until your blood pressure comes down on its own. Get help right away.   Call your doctor now or seek immediate care if:    · Your blood pressure is much higher than normal (such as 180/120 or higher), but you don't have symptoms.     · You think high blood pressure is causing symptoms, such as:  ? Severe headache.  ? Blurry vision.    Watch closely for changes in your health, and be sure to contact your doctor if:    · Your blood pressure measures higher than your doctor recommends at least 2 times. That means the top number is higher or the bottom number is higher, or both.     · You think you may be having side effects from your blood pressure medicine. Where can you learn more? Go to http://varsha-krystle.info/. Enter S341 in the search box to learn more about \"High Blood Pressure: Care Instructions. \"  Current as of: December 6, 2017  Content Version: 11.8  © 3109-9505 [employer]. Care instructions adapted under license by [employer] (which disclaims liability or warranty for this information). If you have questions about a medical condition or this instruction, always ask your healthcare professional. [place] [address] any warranty or liability for your use of this information. Body Mass Index: Care Instructions  Your Care Instructions    Body mass index (BMI) can help you see if your weight is raising your risk for health problems.  It uses a formula to compare how much you weigh with how tall you are. · A BMI lower than 18.5 is considered underweight. · A BMI between 18.5 and 24.9 is considered healthy. · A BMI between 25 and 29.9 is considered overweight. A BMI of 30 or higher is considered obese. If your BMI is in the normal range, it means that you have a lower risk for weight-related health problems. If your BMI is in the overweight or obese range, you may be at increased risk for weight-related health problems, such as high blood pressure, heart disease, stroke, arthritis or joint pain, and diabetes. If your BMI is in the underweight range, you may be at increased risk for health problems such as fatigue, lower protection (immunity) against illness, muscle loss, bone loss, hair loss, and hormone problems. BMI is just one measure of your risk for weight-related health problems. You may be at higher risk for health problems if you are not active, you eat an unhealthy diet, or you drink too much alcohol or use tobacco products. Follow-up care is a key part of your treatment and safety. Be sure to make and go to all appointments, and call your doctor if you are having problems. It's also a good idea to know your test results and keep a list of the medicines you take. How can you care for yourself at home? · Practice healthy eating habits. This includes eating plenty of fruits, vegetables, whole grains, lean protein, and low-fat dairy. · If your doctor recommends it, get more exercise. Walking is a good choice. Bit by bit, increase the amount you walk every day. Try for at least 30 minutes on most days of the week. · Do not smoke. Smoking can increase your risk for health problems. If you need help quitting, talk to your doctor about stop-smoking programs and medicines. These can increase your chances of quitting for good. · Limit alcohol to 2 drinks a day for men and 1 drink a day for women. Too much alcohol can cause health problems.   If you have a BMI higher than 25  · Your doctor may do other tests to check your risk for weight-related health problems. This may include measuring the distance around your waist. A waist measurement of more than 40 inches in men or 35 inches in women can increase the risk of weight-related health problems. · Talk with your doctor about steps you can take to stay healthy or improve your health. You may need to make lifestyle changes to lose weight and stay healthy, such as changing your diet and getting regular exercise. If you have a BMI lower than 18.5  · Your doctor may do other tests to check your risk for health problems. · Talk with your doctor about steps you can take to stay healthy or improve your health. You may need to make lifestyle changes to gain or maintain weight and stay healthy, such as getting more healthy foods in your diet and doing exercises to build muscle. Where can you learn more? Go to http://varsha-krystle.info/. Enter S176 in the search box to learn more about \"Body Mass Index: Care Instructions. \"  Current as of: October 13, 2016  Content Version: 11.4  © 9374-8421 [employer]. Care instructions adapted under license by [employer] (which disclaims liability or warranty for this information). If you have questions about a medical condition or this instruction, always ask your healthcare professional. [address] any warranty or liability for your use of this information.

## 2019-03-13 DIAGNOSIS — R60.0 BILATERAL LEG EDEMA: ICD-10-CM

## 2019-03-13 RX ORDER — FUROSEMIDE 40 MG/1
TABLET ORAL
Qty: 30 TAB | Refills: 3 | Status: SHIPPED | OUTPATIENT
Start: 2019-03-13 | End: 2021-01-05 | Stop reason: SDUPTHER

## 2019-06-26 ENCOUNTER — OFFICE VISIT (OUTPATIENT)
Dept: FAMILY MEDICINE CLINIC | Age: 69
End: 2019-06-26

## 2019-06-26 VITALS
SYSTOLIC BLOOD PRESSURE: 122 MMHG | TEMPERATURE: 98.2 F | HEART RATE: 68 BPM | RESPIRATION RATE: 18 BRPM | BODY MASS INDEX: 41.7 KG/M2 | WEIGHT: 212.4 LBS | HEIGHT: 60 IN | DIASTOLIC BLOOD PRESSURE: 62 MMHG | OXYGEN SATURATION: 95 %

## 2019-06-26 DIAGNOSIS — R60.0 BILATERAL LEG EDEMA: Primary | ICD-10-CM

## 2019-06-26 DIAGNOSIS — E78.5 HYPERLIPIDEMIA, UNSPECIFIED HYPERLIPIDEMIA TYPE: ICD-10-CM

## 2019-06-26 DIAGNOSIS — Z12.39 BREAST CANCER SCREENING: ICD-10-CM

## 2019-06-26 DIAGNOSIS — I10 ESSENTIAL HYPERTENSION: ICD-10-CM

## 2019-06-26 DIAGNOSIS — J45.909 UNCOMPLICATED ASTHMA, UNSPECIFIED ASTHMA SEVERITY, UNSPECIFIED WHETHER PERSISTENT: ICD-10-CM

## 2019-06-26 NOTE — PROGRESS NOTES
ROYAL Dailey is a 81 y.o. female  Chief Complaint   Patient presents with    Hypertension     f/u    Cholesterol Problem     f/u   Hypertension - Denies dizziness, blurred vision, chest pain and or shortness of breath. She does have some swelling in her lower extremities but reports this does go down at night when she elevates them. Denies desire to talk about her mental health. She states she does not have depression or anxiety. She states she is not taking any medications and she is not seeing a counselor. She states she does not need this. Reports her asthma is better. Reports she has not had to use her inhaler. She denies wheezing and or shortness of breath. She states she does not want any immunizations today and does not want any screenings. However she is asking for a mammogram.    Past Medical History  Past Medical History:   Diagnosis Date    Asthma     Hypertension     Latent syphilis 3/22/2017    Osteoarthritis of ankle     Right ankle pain     posterior tibial tendon interstitial tear    Right foot pain     hindfoot arthrosis    Sleep disorder        Surgical History  Past Surgical History:   Procedure Laterality Date    HX  SECTION          Medications  Current Outpatient Medications   Medication Sig Dispense Refill    furosemide (LASIX) 40 mg tablet TAKE 1 TABLET BY MOUTH ONCE DAILY 30 Tab 3    lisinopril (PRINIVIL, ZESTRIL) 20 mg tablet Take 1 Tab by mouth daily. 90 Tab 1    aspirin delayed-release 81 mg tablet Take  by mouth daily.  FLOVENT DISKUS 100 mcg/actuation dsdv INHALE 1 PUFF BY MOUTH TWICE DAILY 1 Each 2    Nebulizer Accessories kit To use with nebulizer 1 Kit 0    Nebulizer & Compressor machine 1 Each by Does Not Apply route daily as needed.  1 Each 0    VENTOLIN HFA 90 mcg/actuation inhaler INHALE 1-2 PUFFS EVERY 4HRS AS NEEDED FOR WHEEZE  0    benzonatate (TESSALON) 100 mg capsule TAKE ONE CAPSULE 3 TIMES A DAY AS NEEDED FOR COUGH SWALLOW WHOLE  0    loratadine (CLARITIN REDITABS) 10 mg dissolvable tablet Take 1 Tab by mouth daily. 30 Tab 0    predniSONE (DELTASONE) 20 mg tablet Take 1 Tab by mouth daily. 3 Tab 0    potassium chloride SR (K-TAB) 20 mEq tablet Take 1 Tab by mouth daily. 30 Tab 3    acetaminophen (TYLENOL ARTHRITIS) 650 mg CR tablet Take 650 mg by mouth every six (6) hours as needed for Pain.          Allergies  No Known Allergies    Family History  Family History   Problem Relation Age of Onset    Diabetes Mother     Hypertension Mother     Heart Disease Father     Hypertension Father     Gout Father     Heart Attack Brother     Schizophrenia Brother        Social History  Social History     Socioeconomic History    Marital status:      Spouse name: Not on file    Number of children: Not on file    Years of education: Not on file    Highest education level: Not on file   Occupational History    Not on file   Social Needs    Financial resource strain: Not on file    Food insecurity:     Worry: Not on file     Inability: Not on file    Transportation needs:     Medical: Not on file     Non-medical: Not on file   Tobacco Use    Smoking status: Never Smoker    Smokeless tobacco: Never Used   Substance and Sexual Activity    Alcohol use: No    Drug use: No    Sexual activity: Never   Lifestyle    Physical activity:     Days per week: Not on file     Minutes per session: Not on file    Stress: Not on file   Relationships    Social connections:     Talks on phone: Not on file     Gets together: Not on file     Attends Yarsani service: Not on file     Active member of club or organization: Not on file     Attends meetings of clubs or organizations: Not on file     Relationship status: Not on file    Intimate partner violence:     Fear of current or ex partner: Not on file     Emotionally abused: Not on file     Physically abused: Not on file     Forced sexual activity: Not on file   Other Topics Concern    Not on file   Social History Narrative    Not on file       Problem List  Patient Active Problem List   Diagnosis Code    HTN (hypertension) I10    Hyperlipidemia E78.5    Family history of heart attack, premature, brother 46s Z80.55    Prediabetes R78.1    Family history of diabetes mellitus type II, mother Z80.1    Vitamin D deficiency E55.9    ACP (advance care planning) Z71.89    Latent syphilis A53.0    Obesity, morbid ([facility] [address] E66.01       Review of Systems  Review of Systems   Constitutional: Negative for chills and fever. Respiratory: Negative for shortness of breath. Cardiovascular: Negative for chest pain. Gastrointestinal: Negative for abdominal pain, nausea and vomiting. Genitourinary: Negative. Psychiatric/Behavioral: Negative for depression, substance abuse and suicidal ideas. The patient is not nervous/anxious. Vital Signs  Vitals:    06/26/19 1409   BP: 122/62   Pulse: 68   Resp: 18   Temp: 98.2 °F (36.8 °C)   TempSrc: Oral   SpO2: 95%   Weight: 212 lb 6.4 oz (96.3 kg)   Height: 5' (1.524 m)   PainSc:   0 - No pain       Physical Exam  Physical Exam   Constitutional: She is oriented to person, place, and time. HENT:   Mouth/Throat: Oropharynx is clear and moist.   Eyes: Pupils are equal, round, and reactive to light. Cardiovascular: Normal rate, regular rhythm and normal heart sounds. Pulmonary/Chest: Effort normal and breath sounds normal.   Abdominal: Soft. Bowel sounds are normal.   Musculoskeletal: She exhibits edema (+3). Edema nonpitting to legs bilaterally. Neurological: She is alert and oriented to person, place, and time. Psychiatric: She has a normal mood and affect. Her behavior is normal.   Vitals reviewed.       Diagnostics  Orders Placed This Encounter    [facility]IN MAMMO BI SCREENING INCL CAD     Standing Status:   Future     Standing Expiration Date:   7/26/2020     Order Specific Question:   Reason for Exam     Answer:   annual    METABOLIC PANEL, COMPREHENSIVE     Standing Status:   Future     Standing Expiration Date:   6/26/2020    LIPID PANEL     Standing Status:   Future     Standing Expiration Date:   6/26/2020    CBC WITH AUTOMATED DIFF     Standing Status:   Future     Standing Expiration Date:   6/26/2020    TSH 3RD GENERATION     Standing Status:   Future     Standing Expiration Date:   6/26/2020       Results  Results for orders placed or performed in visit on 08/08/18   CBC WITH AUTOMATED DIFF   Result Value Ref Range    WBC 4.4 3.4 - 10.8 x10E3/uL    RBC 5.05 3.77 - 5.28 x10E6/uL    HGB 11.2 11.1 - 15.9 g/dL    HCT 36.5 34.0 - 46.6 %    MCV 72 (L) 79 - 97 fL    MCH 22.2 (L) 26.6 - 33.0 pg    MCHC 30.7 (L) 31.5 - 35.7 g/dL    RDW 16.8 (H) 12.3 - 15.4 %    PLATELET 647 865 - 722 x10E3/uL    NEUTROPHILS 52 Not Estab. %    Lymphocytes 35 Not Estab. %    MONOCYTES 10 Not Estab. %    EOSINOPHILS 3 Not Estab. %    BASOPHILS 0 Not Estab. %    ABS. NEUTROPHILS 2.3 1.4 - 7.0 x10E3/uL    Abs Lymphocytes 1.5 0.7 - 3.1 x10E3/uL    ABS. MONOCYTES 0.4 0.1 - 0.9 x10E3/uL    ABS. EOSINOPHILS 0.1 0.0 - 0.4 x10E3/uL    ABS. BASOPHILS 0.0 0.0 - 0.2 x10E3/uL    IMMATURE GRANULOCYTES 0 Not Estab. %    ABS. IMM. GRANS. 0.0 0.0 - 0.1 J58E1/CO   METABOLIC PANEL, COMPREHENSIVE   Result Value Ref Range    Glucose 91 65 - 99 mg/dL    BUN 14 8 - 27 mg/dL    Creatinine 1.00 0.57 - 1.00 mg/dL    GFR est non-AA 59 (L) >59 mL/min/1.73    GFR est AA 68 >59 mL/min/1.73    BUN/Creatinine ratio 14 12 - 28    Sodium 142 134 - 144 mmol/L    Potassium 4.1 3.5 - 5.2 mmol/L    Chloride 108 (H) 96 - 106 mmol/L    CO2 23 20 - 29 mmol/L    Calcium 9.0 8.7 - 10.3 mg/dL    Protein, total 7.0 6.0 - 8.5 g/dL    Albumin 3.9 3.6 - 4.8 g/dL    GLOBULIN, TOTAL 3.1 1.5 - 4.5 g/dL    A-G Ratio 1.3 1.2 - 2.2    Bilirubin, total <0.2 0.0 - 1.2 mg/dL    Alk.  phosphatase 110 39 - 117 IU/L    AST (SGOT) 14 0 - 40 IU/L    ALT (SGPT) 11 0 - 32 IU/L   LIPID PANEL   Result Value Ref Range Cholesterol, total 238 (H) 100 - 199 mg/dL    Triglyceride 58 0 - 149 mg/dL    HDL Cholesterol 76 >39 mg/dL    VLDL, calculated 12 5 - 40 mg/dL    LDL, calculated 150 (H) 0 - 99 mg/dL   CVD REPORT   Result Value Ref Range    INTERPRETATION Note     PDF IMAGE Not applicable    CKD REPORT   Result Value Ref Range    Interpretation Note    HEMOGLOBIN A1C WITH EAG   Result Value Ref Range    Hemoglobin A1c 6.0 (H) 4.8 - 5.6 %    Estimated average glucose 126 mg/dL         Assessment and Plan  Diagnoses and all orders for this visit:    1. Bilateral leg edema  -     METABOLIC PANEL, COMPREHENSIVE; Future  -     LIPID PANEL; Future  -     CBC WITH AUTOMATED DIFF; Future  -     TSH 3RD GENERATION; Future    2. Essential hypertension  -     METABOLIC PANEL, COMPREHENSIVE; Future  -     LIPID PANEL; Future  -     CBC WITH AUTOMATED DIFF; Future  -     TSH 3RD GENERATION; Future    3. Uncomplicated asthma, unspecified asthma severity, unspecified whether persistent  -     METABOLIC PANEL, COMPREHENSIVE; Future  -     LIPID PANEL; Future  -     CBC WITH AUTOMATED DIFF; Future  -     TSH 3RD GENERATION; Future    4. Hyperlipidemia, unspecified hyperlipidemia type  -     METABOLIC PANEL, COMPREHENSIVE; Future  -     LIPID PANEL; Future  -     CBC WITH AUTOMATED DIFF; Future  -     TSH 3RD GENERATION; Future    5. Breast cancer screening  -     [facility] MAMMO BI SCREENING INCL CAD; Future    6. Screening for osteoporosis      Pasting to go for fasting labs. After care summary printed and reviewed with patient. Plan reviewed with patient. Questions answered. Patient verbalized understanding of plan and is in agreement with plan. Patient to follow up in three months or earlier if symptoms worsen. Follow-up and Dispositions    · Return in about 3 months (around 9/26/2019), or if symptoms worsen or fail to improve.        MARTHA Castano

## 2019-06-26 NOTE — PROGRESS NOTES
Paulo Nguyen presents today for   Chief Complaint   Patient presents with    Hypertension     f/u    Cholesterol Problem     f/u       Iris P Rico preferred language for health care discussion is english/other. Is someone accompanying this pt? No    Is the patient using any DME equipment during OV? No    Depression Screening:  3 most recent PHQ Screens 6/26/2019   Little interest or pleasure in doing things Not at all   Feeling down, depressed, irritable, or hopeless Not at all   Total Score PHQ 2 0       Learning Assessment:  Learning Assessment 12/27/2018   PRIMARY LEARNER Patient   HIGHEST LEVEL OF EDUCATION - PRIMARY LEARNER  SOME COLLEGE   BARRIERS PRIMARY LEARNER NONE   CO-LEARNER CAREGIVER No   PRIMARY LANGUAGE ENGLISH   LEARNER PREFERENCE PRIMARY LISTENING     -     -   ANSWERED BY self   RELATIONSHIP SELF       Abuse Screening:  Abuse Screening Questionnaire 6/26/2019   Do you ever feel afraid of your partner? N   Are you in a relationship with someone who physically or mentally threatens you? N   Is it safe for you to go home? Y       Fall Risk  Fall Risk Assessment, last 12 mths 6/26/2019   Able to walk? Yes   Fall in past 12 months? No         Coordination of Care:  1. Have you been to the ER, urgent care clinic since your last visit? Hospitalized since your last visit? ED visit r/t mental health. 2. Have you seen or consulted any other health care providers outside of the [address] [place] [address] since your last visit? Include any pap smears or colon screening. No    Advance Directive:  1. Do you have an advance directive in place? Patient Reply:No    2. If not, would you like material regarding how to put one in place?  Patient Reply: No

## 2019-07-12 LAB
ALBUMIN SERPL-MCNC: 3.6 G/DL (ref 3.6–4.8)
ALBUMIN/GLOB SERPL: 1.1 {RATIO} (ref 1.2–2.2)
ALP SERPL-CCNC: 87 IU/L (ref 39–117)
ALT SERPL-CCNC: 11 IU/L (ref 0–32)
AST SERPL-CCNC: 16 IU/L (ref 0–40)
BASOPHILS # BLD AUTO: 0 X10E3/UL (ref 0–0.2)
BASOPHILS NFR BLD AUTO: 1 %
BILIRUB SERPL-MCNC: 0.3 MG/DL (ref 0–1.2)
BUN SERPL-MCNC: 11 MG/DL (ref 8–27)
BUN/CREAT SERPL: 13 (ref 12–28)
CALCIUM SERPL-MCNC: 9.1 MG/DL (ref 8.7–10.3)
CHLORIDE SERPL-SCNC: 106 MMOL/L (ref 96–106)
CHOLEST SERPL-MCNC: 224 MG/DL (ref 100–199)
CO2 SERPL-SCNC: 22 MMOL/L (ref 20–29)
CREAT SERPL-MCNC: 0.84 MG/DL (ref 0.57–1)
EOSINOPHIL # BLD AUTO: 0.1 X10E3/UL (ref 0–0.4)
EOSINOPHIL NFR BLD AUTO: 3 %
ERYTHROCYTE [DISTWIDTH] IN BLOOD BY AUTOMATED COUNT: 17.5 % (ref 12.3–15.4)
GLOBULIN SER CALC-MCNC: 3.3 G/DL (ref 1.5–4.5)
GLUCOSE SERPL-MCNC: 89 MG/DL (ref 65–99)
HCT VFR BLD AUTO: 37 % (ref 34–46.6)
HDLC SERPL-MCNC: 73 MG/DL
HGB BLD-MCNC: 11.1 G/DL (ref 11.1–15.9)
IMM GRANULOCYTES # BLD AUTO: 0 X10E3/UL (ref 0–0.1)
IMM GRANULOCYTES NFR BLD AUTO: 0 %
INTERPRETATION, 910389: NORMAL
LDLC SERPL CALC-MCNC: 137 MG/DL (ref 0–99)
LYMPHOCYTES # BLD AUTO: 1.1 X10E3/UL (ref 0.7–3.1)
LYMPHOCYTES NFR BLD AUTO: 33 %
MCH RBC QN AUTO: 22.7 PG (ref 26.6–33)
MCHC RBC AUTO-ENTMCNC: 30 G/DL (ref 31.5–35.7)
MCV RBC AUTO: 76 FL (ref 79–97)
MONOCYTES # BLD AUTO: 0.3 X10E3/UL (ref 0.1–0.9)
MONOCYTES NFR BLD AUTO: 10 %
NEUTROPHILS # BLD AUTO: 1.9 X10E3/UL (ref 1.4–7)
NEUTROPHILS NFR BLD AUTO: 53 %
PLATELET # BLD AUTO: 175 X10E3/UL (ref 150–450)
POTASSIUM SERPL-SCNC: 3.9 MMOL/L (ref 3.5–5.2)
PROT SERPL-MCNC: 6.9 G/DL (ref 6–8.5)
RBC # BLD AUTO: 4.9 X10E6/UL (ref 3.77–5.28)
SODIUM SERPL-SCNC: 143 MMOL/L (ref 134–144)
TRIGL SERPL-MCNC: 70 MG/DL (ref 0–149)
TSH SERPL DL<=0.005 MIU/L-ACNC: 1.07 UIU/ML (ref 0.45–4.5)
VLDLC SERPL CALC-MCNC: 14 MG/DL (ref 5–40)
WBC # BLD AUTO: 3.5 X10E3/UL (ref 3.4–10.8)

## 2019-08-04 NOTE — PROGRESS NOTES
ROYAL  Suzi Parker is a 81 y.o. female  Chief Complaint   Patient presents with    Results     labs complete     Reports she was in the hospital for schizophrenia. States she does not want to talk about this. She states she was told she was waving a knife in front of her grand kids but she does not believe this. She states she does not take any medications for schizophrenia. She denies depression. She denies anxiety. She denies desire to hurt or harm herself or others. She denies suicidal ideations. She states she has been for her labs. She does admit to taking her blood pressure medications and using her inhalers. She denies chest pain, shortness of breath, or palpitations. Past Medical History  Past Medical History:   Diagnosis Date    Asthma     Hypertension     Latent syphilis 3/22/2017    Osteoarthritis of ankle     Right ankle pain     posterior tibial tendon interstitial tear    Right foot pain     hindfoot arthrosis    Sleep disorder        Surgical History  Past Surgical History:   Procedure Laterality Date    HX  SECTION          Medications  Current Outpatient Medications   Medication Sig Dispense Refill    furosemide (LASIX) 40 mg tablet TAKE 1 TABLET BY MOUTH ONCE DAILY 30 Tab 3    FLOVENT DISKUS 100 mcg/actuation dsdv INHALE 1 PUFF BY MOUTH TWICE DAILY 1 Each 2    VENTOLIN HFA 90 mcg/actuation inhaler INHALE 1-2 PUFFS EVERY 4HRS AS NEEDED FOR WHEEZE  0    benzonatate (TESSALON) 100 mg capsule TAKE ONE CAPSULE 3 TIMES A DAY AS NEEDED FOR COUGH SWALLOW WHOLE  0    loratadine (CLARITIN REDITABS) 10 mg dissolvable tablet Take 1 Tab by mouth daily. 30 Tab 0    lisinopril (PRINIVIL, ZESTRIL) 20 mg tablet Take 1 Tab by mouth daily. 90 Tab 1    potassium chloride SR (K-TAB) 20 mEq tablet Take 1 Tab by mouth daily. 30 Tab 3    aspirin delayed-release 81 mg tablet Take  by mouth daily.       acetaminophen (TYLENOL ARTHRITIS) 650 mg CR tablet Take 650 mg by mouth every six (6) hours as needed for Pain.  Nebulizer Accessories kit To use with nebulizer 1 Kit 0    Nebulizer & Compressor machine 1 Each by Does Not Apply route daily as needed. 1 Each 0    predniSONE (DELTASONE) 20 mg tablet Take 1 Tab by mouth daily.  3 Tab 0       Allergies  No Known Allergies    Family History  Family History   Problem Relation Age of Onset    Diabetes Mother     Hypertension Mother     Heart Disease Father     Hypertension Father     Gout Father     Heart Attack Brother     Schizophrenia Brother        Social History  Social History     Socioeconomic History    Marital status:      Spouse name: Not on file    Number of children: Not on file    Years of education: Not on file    Highest education level: Not on file   Occupational History    Not on file   Social Needs    Financial resource strain: Not on file    Food insecurity:     Worry: Not on file     Inability: Not on file    Transportation needs:     Medical: Not on file     Non-medical: Not on file   Tobacco Use    Smoking status: Never Smoker    Smokeless tobacco: Never Used   Substance and Sexual Activity    Alcohol use: No    Drug use: No    Sexual activity: Never   Lifestyle    Physical activity:     Days per week: Not on file     Minutes per session: Not on file    Stress: Not on file   Relationships    Social connections:     Talks on phone: Not on file     Gets together: Not on file     Attends Adventism service: Not on file     Active member of club or organization: Not on file     Attends meetings of clubs or organizations: Not on file     Relationship status: Not on file    Intimate partner violence:     Fear of current or ex partner: Not on file     Emotionally abused: Not on file     Physically abused: Not on file     Forced sexual activity: Not on file   Other Topics Concern    Not on file   Social History Narrative    Not on file       Problem List  Patient Active Problem List   Diagnosis Code    HTN (hypertension) I10    Hyperlipidemia E78.5    Family history of heart attack, premature, brother 46s Z80.55    Prediabetes R78.1    Family history of diabetes mellitus type II, mother Z80.1    Vitamin D deficiency E55.9    ACP (advance care planning) Z70.80    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    Obesity, morbid ([facility] [address] E66.01    Acute confusion R41.0    Altered mental status R41.82    Positive serology for syphilis A53.0       Review of Systems  Review of Systems   Constitutional: Negative for chills and fever. Respiratory: Negative for shortness of breath. Cardiovascular: Negative for chest pain and palpitations. Gastrointestinal: Negative for abdominal pain, nausea and vomiting. Neurological: Negative for dizziness. Psychiatric/Behavioral: Negative for suicidal ideas. The patient is not nervous/anxious. Vital Signs  Vitals:    08/06/19 1117   BP: 118/75   Pulse: 94   Resp: 18   Temp: 98.2 °F (36.8 °C)   TempSrc: Oral   SpO2: 97%   Weight: 207 lb 6.4 oz (94.1 kg)   Height: 5' (1.524 m)   PainSc:   0 - No pain       Physical Exam  Physical Exam   Constitutional: She is oriented to person, place, and time. Eyes: Pupils are equal, round, and reactive to light. Cardiovascular: Normal rate and regular rhythm. Pulmonary/Chest: Effort normal and breath sounds normal.   Abdominal: Soft. Bowel sounds are normal.   Neurological: She is alert and oriented to person, place, and time. Skin: Skin is warm and dry. Psychiatric: She has a normal mood and affect. Her speech is normal and behavior is normal. She expresses no suicidal ideation.        Diagnostics  Orders Placed This Encounter    Pneumococcal Polysaccharide vaccine, 23-Valent, Adult or Immunocompromised    ADMIN PNEUMOCOCCAL VACCINE  Medicare Injection Admin Charge       Results  Results for orders placed or performed in visit on 06/26/19   CBC WITH AUTOMATED DIFF   Result Value Ref Range    WBC 3.5 3.4 - 10.8 x10E3/uL    RBC 4.90 3.77 - 5.28 x10E6/uL    HGB 11.1 11.1 - 15.9 g/dL    HCT 37.0 34.0 - 46.6 %    MCV 76 (L) 79 - 97 fL    MCH 22.7 (L) 26.6 - 33.0 pg    MCHC 30.0 (L) 31.5 - 35.7 g/dL    RDW 17.5 (H) 12.3 - 15.4 %    PLATELET 779 696 - 566 x10E3/uL    NEUTROPHILS 53 Not Estab. %    Lymphocytes 33 Not Estab. %    MONOCYTES 10 Not Estab. %    EOSINOPHILS 3 Not Estab. %    BASOPHILS 1 Not Estab. %    ABS. NEUTROPHILS 1.9 1.4 - 7.0 x10E3/uL    Abs Lymphocytes 1.1 0.7 - 3.1 x10E3/uL    ABS. MONOCYTES 0.3 0.1 - 0.9 x10E3/uL    ABS. EOSINOPHILS 0.1 0.0 - 0.4 x10E3/uL    ABS. BASOPHILS 0.0 0.0 - 0.2 x10E3/uL    IMMATURE GRANULOCYTES 0 Not Estab. %    ABS. IMM. GRANS. 0.0 0.0 - 0.1 J60H3/RS   METABOLIC PANEL, COMPREHENSIVE   Result Value Ref Range    Glucose 89 65 - 99 mg/dL    BUN 11 8 - 27 mg/dL    Creatinine 0.84 0.57 - 1.00 mg/dL    GFR est non-AA 72 >59 mL/min/1.73    GFR est AA 83 >59 mL/min/1.73    BUN/Creatinine ratio 13 12 - 28    Sodium 143 134 - 144 mmol/L    Potassium 3.9 3.5 - 5.2 mmol/L    Chloride 106 96 - 106 mmol/L    CO2 22 20 - 29 mmol/L    Calcium 9.1 8.7 - 10.3 mg/dL    Protein, total 6.9 6.0 - 8.5 g/dL    Albumin 3.6 3.6 - 4.8 g/dL    GLOBULIN, TOTAL 3.3 1.5 - 4.5 g/dL    A-G Ratio 1.1 (L) 1.2 - 2.2    Bilirubin, total 0.3 0.0 - 1.2 mg/dL    Alk. phosphatase 87 39 - 117 IU/L    AST (SGOT) 16 0 - 40 IU/L    ALT (SGPT) 11 0 - 32 IU/L   LIPID PANEL   Result Value Ref Range    Cholesterol, total 224 (H) 100 - 199 mg/dL    Triglyceride 70 0 - 149 mg/dL    HDL Cholesterol 73 >39 mg/dL    VLDL, calculated 14 5 - 40 mg/dL    LDL, calculated 137 (H) 0 - 99 mg/dL   TSH 3RD GENERATION   Result Value Ref Range    TSH 1.070 0.450 - 4.500 uIU/mL   CVD REPORT   Result Value Ref Range    INTERPRETATION Note        Assessment and Plan  Diagnoses and all orders for this visit:    1. Essential hypertension    2. Schizoaffective disorder, bipolar type ([facility] [address]    3. Hyperlipidemia, unspecified hyperlipidemia type    4. Hospital discharge follow-up    5. Encounter for immunization  -     PNEUMOCOCCAL POLYSACCHARIDE VACCINE, 23-VALENT, ADULT OR IMMUNOSUPPRESSED PT DOSE,  -     ADMIN PNEUMOCOCCAL VACCINE      Labs discussed. Strongly recommended patient take medications prescribed for schizoaffective  After care summary printed and reviewed with patient. Plan reviewed with patient. Questions answered. Patient verbalized understanding of plan and is in agreement with plan. Patient to follow up in one week or earlier if symptoms worsen.      Millie Bledsoe, TONYP-C

## 2019-08-06 ENCOUNTER — OFFICE VISIT (OUTPATIENT)
Dept: FAMILY MEDICINE CLINIC | Age: 69
End: 2019-08-06

## 2019-08-06 VITALS
BODY MASS INDEX: 40.72 KG/M2 | SYSTOLIC BLOOD PRESSURE: 118 MMHG | RESPIRATION RATE: 18 BRPM | WEIGHT: 207.4 LBS | OXYGEN SATURATION: 97 % | TEMPERATURE: 98.2 F | HEIGHT: 60 IN | DIASTOLIC BLOOD PRESSURE: 75 MMHG | HEART RATE: 94 BPM

## 2019-08-06 DIAGNOSIS — E78.5 HYPERLIPIDEMIA, UNSPECIFIED HYPERLIPIDEMIA TYPE: ICD-10-CM

## 2019-08-06 DIAGNOSIS — Z23 ENCOUNTER FOR IMMUNIZATION: ICD-10-CM

## 2019-08-06 DIAGNOSIS — I10 ESSENTIAL HYPERTENSION: Primary | ICD-10-CM

## 2019-08-06 DIAGNOSIS — F25.0 SCHIZOAFFECTIVE DISORDER, BIPOLAR TYPE (HCC): ICD-10-CM

## 2019-08-06 DIAGNOSIS — Z09 HOSPITAL DISCHARGE FOLLOW-UP: ICD-10-CM

## 2019-08-06 PROBLEM — A51.5: Status: ACTIVE | Noted: 2017-01-04

## 2019-08-06 NOTE — PROGRESS NOTES
Leela Gómez 1950 female who presents for routine immunizations. Patient denies any symptoms , reactions or allergies that would exclude them from being immunized today. Risks and adverse reactions were discussed and the VIS was given to them. All questions were addressed. Order placed for Pneumococcal 23,  per Verbal Order from NO [place] with read back. Patient was observed for 15 min post injection. There were no reactions observed.     Jaspreet Lyons

## 2019-08-06 NOTE — PROGRESS NOTES
Jesica Manning presents today for   Chief Complaint   Patient presents with    Results     labs complete       Leela Gómez preferred language for health care discussion is english/other. Is someone accompanying this pt? No    Is the patient using any DME equipment during OV? No    Depression Screening:  3 most recent PHQ Screens 6/26/2019   Little interest or pleasure in doing things Not at all   Feeling down, depressed, irritable, or hopeless Not at all   Total Score PHQ 2 0       Learning Assessment:  Learning Assessment 12/27/2018   PRIMARY LEARNER Patient   HIGHEST LEVEL OF EDUCATION - PRIMARY LEARNER  SOME COLLEGE   BARRIERS PRIMARY LEARNER NONE   CO-LEARNER CAREGIVER No   PRIMARY LANGUAGE ENGLISH   LEARNER PREFERENCE PRIMARY LISTENING     -     -   ANSWERED BY self   RELATIONSHIP SELF       Abuse Screening:  Abuse Screening Questionnaire 6/26/2019   Do you ever feel afraid of your partner? N   Are you in a relationship with someone who physically or mentally threatens you? N   Is it safe for you to go home? Y       Fall Risk  Fall Risk Assessment, last 12 mths 6/26/2019   Able to walk? Yes   Fall in past 12 months? No         Coordination of Care:  1. Have you been to the ER, urgent care clinic since your last visit? Hospitalized since your last visit? Recent ED visit 7/27/19 r/t schizophrenia. ( [facility]    2. Have you seen or consulted any other health care providers outside of the [address] [place] [address] since your last visit? Include any pap smears or colon screening. No    Advance Directive:  1. Do you have an advance directive in place? Patient Reply:No    2. If not, would you like material regarding how to put one in place?  Patient Reply: No

## 2019-08-06 NOTE — PATIENT INSTRUCTIONS
Vaccine Information Statement    Pneumococcal Polysaccharide Vaccine: What You Need to Know    Many Vaccine Information Statements are available in Yoruba and other languages. See www.immunize.org/vis. Hojas de información Sobre Vacunas están disponibles en español y en muchos otros idiomas. Visite Michelle.si. 1. Why get vaccinated? Vaccination can protect older adults (and some children and younger adults) from pneumococcal disease. Pneumococcal disease is caused by bacteria that can spread from person to person through close contact. It can cause ear infections, and it can also lead to more serious infections of the:   Lungs (pneumonia),   Blood (bacteremia), and   Covering of the brain and spinal cord (meningitis). Meningitis can cause deafness and brain damage, and it can be fatal.      Anyone can get pneumococcal disease, but children under 3years of age, people with certain medical conditions, adults over 72years of age, and cigarette smokers are at the highest risk. About 18,000 older adults die each year from pneumococcal disease in the [place]. Treatment of pneumococcal infections with penicillin and other drugs used to be more effective. But some strains of the disease have become resistant to these drugs. This makes prevention of the disease, through vaccination, even more important. 2. Pneumococcal polysaccharide vaccine (PPSV23)    Pneumococcal polysaccharide vaccine (PPSV23) protects against 23 types of pneumococcal bacteria. It will not prevent all pneumococcal disease. PPSV23 is recommended for:   All adults 72years of age and older,   Anyone 2 through 59years of age with certain long-term health problems,   Anyone 2 through 59years of age with a weakened immune system,   Adults 23 through 59years of age who smoke cigarettes or have asthma. Most people need only one dose of PPSV.   A second dose is recommended for certain high-risk groups. People 72 and older should get a dose even if they have gotten one or more doses of the vaccine before they turned 65. Your healthcare provider can give you more information about these recommendations. Most healthy adults develop protection within 2 to 3 weeks of getting the shot. 3. Some people should not get this vaccine     Anyone who has had a life-threatening allergic reaction to PPSV should not get another dose.  Anyone who has a severe allergy to any component of PPSV should not receive it. Tell your provider if you have any severe allergies.  Anyone who is moderately or severely ill when the shot is scheduled may be asked to wait until they recover before getting the vaccine. Someone with a mild illness can usually be vaccinated.  Children less than 3years of age should not receive this vaccine.  There is no evidence that PPSV is harmful to either a pregnant woman or to her fetus. However, as a precaution, women who need the vaccine should be vaccinated before becoming pregnant, if possible. 4. Risks of a vaccine reaction    With any medicine, including vaccines, there is a chance of side effects. These are usually mild and go away on their own, but serious reactions are also possible. About half of people who get PPSV have mild side effects, such as redness or pain where the shot is given, which go away within about two days. Less than 1 out of 100 people develop a fever, muscle aches, or more severe local reactions. Problems that could happen after any vaccine:     People sometimes faint after a medical procedure, including vaccination. Sitting or lying down for about 15 minutes can help prevent fainting, and injuries caused by a fall. Tell your doctor if you feel dizzy, or have vision changes or ringing in the ears.  Some people get severe pain in the shoulder and have difficulty moving the arm where a shot was given.  This happens very rarely.  Any medication can cause a severe allergic reaction. Such reactions from a vaccine are very rare, estimated at about 1 in a million doses, and would happen within a few minutes to a few hours after the vaccination. As with any medicine, there is a very remote chance of a vaccine causing a serious injury or death. The safety of vaccines is always being monitored. For more information, visit: www.cdc.gov/vaccinesafety/     5. What if there is a serious reaction? What should I look for? Look for anything that concerns you, such as signs of a severe allergic reaction, very high fever, or unusual behavior. Signs of a severe allergic reaction can include hives, swelling of the face and throat, difficulty breathing, a fast heartbeat, dizziness, and weakness. These would usually start a few minutes to a few hours after the vaccination. What should I do? If you think it is a severe allergic reaction or other emergency that cant wait, call 9-1-1 or get to the nearest hospital. Otherwise, call your doctor. Afterward, the reaction should be reported to the Vaccine Adverse Event Reporting System (VAERS). Your doctor might file this report, or you can do it yourself through the VAERS web site at www.vaers. [facility].gov, or by calling 2-439.987.8394. VAERS does not give medical advice. 6. How can I learn more?  Ask your doctor. He or she can give you the vaccine package insert or suggest other sources of information.  Call your local or state health department.    Contact the Centers for Disease Control and Prevention (CDC):  - Call 4-534.207.6229 (1-800-CDC-INFO) or  - Visit CDCs website at [address] 04/24/2015    [facility] for Disease Control and Prevention    Office Use Only

## 2019-10-29 DIAGNOSIS — I10 ESSENTIAL HYPERTENSION: ICD-10-CM

## 2019-10-30 RX ORDER — LISINOPRIL 20 MG/1
20 TABLET ORAL DAILY
Qty: 90 TAB | Refills: 1 | Status: SHIPPED | OUTPATIENT
Start: 2019-10-30 | End: 2019-11-06 | Stop reason: SDUPTHER

## 2019-11-06 ENCOUNTER — OFFICE VISIT (OUTPATIENT)
Dept: FAMILY MEDICINE CLINIC | Age: 69
End: 2019-11-06

## 2019-11-06 VITALS
TEMPERATURE: 98 F | BODY MASS INDEX: 41.43 KG/M2 | OXYGEN SATURATION: 98 % | HEIGHT: 60 IN | DIASTOLIC BLOOD PRESSURE: 75 MMHG | SYSTOLIC BLOOD PRESSURE: 118 MMHG | HEART RATE: 79 BPM | WEIGHT: 211 LBS | RESPIRATION RATE: 16 BRPM

## 2019-11-06 DIAGNOSIS — Z23 ENCOUNTER FOR IMMUNIZATION: ICD-10-CM

## 2019-11-06 DIAGNOSIS — Z78.0 POST-MENOPAUSAL: ICD-10-CM

## 2019-11-06 DIAGNOSIS — E78.5 HYPERLIPIDEMIA, UNSPECIFIED HYPERLIPIDEMIA TYPE: ICD-10-CM

## 2019-11-06 DIAGNOSIS — I10 ESSENTIAL HYPERTENSION: Primary | ICD-10-CM

## 2019-11-06 RX ORDER — LISINOPRIL 20 MG/1
20 TABLET ORAL DAILY
Qty: 90 TAB | Refills: 1 | Status: SHIPPED | OUTPATIENT
Start: 2019-11-06 | End: 2020-12-08

## 2019-11-06 NOTE — PATIENT INSTRUCTIONS
Body Mass Index: Care Instructions Your Care Instructions Body mass index (BMI) can help you see if your weight is raising your risk for health problems. It uses a formula to compare how much you weigh with how tall you are. · A BMI lower than 18.5 is considered underweight. · A BMI between 18.5 and 24.9 is considered healthy. · A BMI between 25 and 29.9 is considered overweight. A BMI of 30 or higher is considered obese. If your BMI is in the normal range, it means that you have a lower risk for weight-related health problems. If your BMI is in the overweight or obese range, you may be at increased risk for weight-related health problems, such as high blood pressure, heart disease, stroke, arthritis or joint pain, and diabetes. If your BMI is in the underweight range, you may be at increased risk for health problems such as fatigue, lower protection (immunity) against illness, muscle loss, bone loss, hair loss, and hormone problems. BMI is just one measure of your risk for weight-related health problems. You may be at higher risk for health problems if you are not active, you eat an unhealthy diet, or you drink too much alcohol or use tobacco products. Follow-up care is a key part of your treatment and safety. Be sure to make and go to all appointments, and call your doctor if you are having problems. It's also a good idea to know your test results and keep a list of the medicines you take. How can you care for yourself at home? · Practice healthy eating habits. This includes eating plenty of fruits, vegetables, whole grains, lean protein, and low-fat dairy. · If your doctor recommends it, get more exercise. Walking is a good choice. Bit by bit, increase the amount you walk every day. Try for at least 30 minutes on most days of the week. · Do not smoke. Smoking can increase your risk for health problems.  If you need help quitting, talk to your doctor about stop-smoking programs and medicines. These can increase your chances of quitting for good. · Limit alcohol to 2 drinks a day for men and 1 drink a day for women. Too much alcohol can cause health problems. If you have a BMI higher than 25 · Your doctor may do other tests to check your risk for weight-related health problems. This may include measuring the distance around your waist. A waist measurement of more than 40 inches in men or 35 inches in women can increase the risk of weight-related health problems. · Talk with your doctor about steps you can take to stay healthy or improve your health. You may need to make lifestyle changes to lose weight and stay healthy, such as changing your diet and getting regular exercise. If you have a BMI lower than 18.5 · Your doctor may do other tests to check your risk for health problems. · Talk with your doctor about steps you can take to stay healthy or improve your health. You may need to make lifestyle changes to gain or maintain weight and stay healthy, such as getting more healthy foods in your diet and doing exercises to build muscle. Where can you learn more? Go to http://varsha-krystle.info/. Enter S176 in the search box to learn more about \"Body Mass Index: Care Instructions. \" Current as of: October 13, 2016 Content Version: 11.4 © 6422-1610 [employer]. Care instructions adapted under license by [employer] (which disclaims liability or warranty for this information). If you have questions about a medical condition or this instruction, always ask your healthcare professional. [place] [address] any warranty or liability for your use of this information. Vaccine Information Statement Influenza (Flu) Vaccine (Inactivated or Recombinant): What You Need to Know Many Vaccine Information Statements are available in Sami and other languages. See www.immunize.org/vis Hojas de información sobre vacunas están disponibles en español y en muchos otros idiomas. Visite www.immunize.org/vis 1. Why get vaccinated? Influenza vaccine can prevent influenza (flu). Flu is a contagious disease that spreads around the [place] every year, usually between October and May. Anyone can get the flu, but it is more dangerous for some people. Infants and young children, people 72years of age and older, pregnant women, and people with certain health conditions or a weakened immune system are at greatest risk of flu complications. Pneumonia, bronchitis, sinus infections and ear infections are examples of flu-related complications. If you have a medical condition, such as heart disease, cancer or diabetes, flu can make it worse. Flu can cause fever and chills, sore throat, muscle aches, fatigue, cough, headache, and runny or stuffy nose. Some people may have vomiting and diarrhea, though this is more common in children than adults. Each year thousands of people in the [facility] die from flu, and many more are hospitalized. Flu vaccine prevents millions of illnesses and flu-related visits to the doctor each year. 2. Influenza vaccines CDC recommends everyone 10months of age and older get vaccinated every flu season. Children 6 months through 6years of age may need 2 doses during a single flu season. Everyone else needs only 1 dose each flu season. It takes about 2 weeks for protection to develop after vaccination. There are many flu viruses, and they are always changing. Each year a new flu vaccine is made to protect against three or four viruses that are likely to cause disease in the upcoming flu season. Even when the vaccine doesnt exactly match these viruses, it may still provide some protection. Influenza vaccine does not cause flu. Influenza vaccine may be given at the same time as other vaccines. 3. Talk with your health care provider Tell your vaccine provider if the person getting the vaccine: 
 Has had an allergic reaction after a previous dose of influenza vaccine, or has any severe, life-threatening allergies.  Has ever had Guillain-Barré Syndrome (also called GBS). In some cases, your health care provider may decide to postpone influenza vaccination to a future visit. People with minor illnesses, such as a cold, may be vaccinated. People who are moderately or severely ill should usually wait until they recover before getting influenza vaccine. Your health care provider can give you more information. 4. Risks of a reaction  Soreness, redness, and swelling where shot is given, fever, muscle aches, and headache can happen after influenza vaccine.  There may be a very small increased risk of Guillain-Barré Syndrome (GBS) after inactivated influenza vaccine (the flu shot). [employer] children who get the flu shot along with pneumococcal vaccine (PCV13), and/or DTaP vaccine at the same time might be slightly more likely to have a seizure caused by fever. Tell your health care provider if a child who is getting flu vaccine has ever had a seizure. People sometimes faint after medical procedures, including vaccination. Tell your provider if you feel dizzy or have vision changes or ringing in the ears. As with any medicine, there is a very remote chance of a vaccine causing a severe allergic reaction, other serious injury, or death. 5. What if there is a serious problem? An allergic reaction could occur after the vaccinated person leaves the clinic. If you see signs of a severe allergic reaction (hives, swelling of the face and throat, difficulty breathing, a fast heartbeat, dizziness, or weakness), call 9-1-1 and get the person to the nearest hospital. 
 
For other signs that concern you, call your health care provider.  
 
Adverse reactions should be reported to the Vaccine Adverse Event Reporting System (VAERS). Your health care provider will usually file this report, or you can do it yourself. Visit the VAERS website at www.vaers. hhs.gov or call 3-678.529.1730. VAERS is only for reporting reactions, and VAERS staff do not give medical advice. 6. The National Vaccine Injury Compensation Program 
 
The [facility] Vaccine Injury Compensation Program (VICP) is a federal program that was created to compensate people who may have been injured by certain vaccines. Visit the VICP website at www.[facility].gov/vaccinecompensation or call 1-473.190.4632 to learn about the program and about filing a claim. There is a time limit to file a claim for compensation. 7. How can I learn more?  Ask your health care provider.  Call your local or state health department.  Contact the Centers for Disease Control and Prevention (CDC): 
- Call 6-935.985.1181 (1-800-CDC-INFO) or 
- Visit CDCs influenza website at www.cdc.gov/flu Vaccine Information Statement (Interim) Inactivated Influenza Vaccine 8/15/2019 
42 ANDREA Huntley [id number] Department of [facility] and [facility] for Disease Control and Prevention Office Use Only

## 2019-11-06 NOTE — PROGRESS NOTES
ROYAL Bashir is a 81 y.o. female  Chief Complaint   Patient presents with    Hypertension    Cholesterol Problem     high chol    Other     schizophrenia     She reports she is out of her blood pressure medications. She reports taking her medications as prescribed. She denies shortness of breath or chest pain. She does have swelling in her legs. She denies dizziness. She is not taking any medications for her schizophrenia. She reports feeling well. She has no new concerns on today's visit. Past Medical History  Past Medical History:   Diagnosis Date    Asthma     Hypertension     Latent syphilis 3/22/2017    Osteoarthritis of ankle     Right ankle pain     posterior tibial tendon interstitial tear    Right foot pain     hindfoot arthrosis    Sleep disorder        Surgical History  Past Surgical History:   Procedure Laterality Date    HX  SECTION          Medications  Current Outpatient Medications   Medication Sig Dispense Refill    lisinopril (PRINIVIL, ZESTRIL) 20 mg tablet Take 1 Tab by mouth daily. 90 Tab 1    furosemide (LASIX) 40 mg tablet TAKE 1 TABLET BY MOUTH ONCE DAILY 30 Tab 3    FLOVENT DISKUS 100 mcg/actuation dsdv INHALE 1 PUFF BY MOUTH TWICE DAILY 1 Each 2    Nebulizer Accessories kit To use with nebulizer 1 Kit 0    Nebulizer & Compressor machine 1 Each by Does Not Apply route daily as needed. 1 Each 0    VENTOLIN HFA 90 mcg/actuation inhaler INHALE 1-2 PUFFS EVERY 4HRS AS NEEDED FOR WHEEZE  0    aspirin delayed-release 81 mg tablet Take  by mouth daily.  acetaminophen (TYLENOL ARTHRITIS) 650 mg CR tablet Take 650 mg by mouth every six (6) hours as needed for Pain.  loratadine (CLARITIN REDITABS) 10 mg dissolvable tablet Take 1 Tab by mouth daily. 30 Tab 0    potassium chloride SR (K-TAB) 20 mEq tablet Take 1 Tab by mouth daily.  30 Tab 3       Allergies  No Known Allergies    Family History  Family History   Problem Relation Age of Onset    Diabetes Mother     Hypertension Mother     Heart Disease Father     Hypertension Father    [facility] Father     Heart Attack Brother     Schizophrenia Brother        Social History  Social History     Socioeconomic History    Marital status:      Spouse name: Not on file    Number of children: Not on file    Years of education: Not on file    Highest education level: Not on file   Occupational History    Not on file   Social Needs    Financial resource strain: Not on file    Food insecurity:     Worry: Not on file     Inability: Not on file    Transportation needs:     Medical: Not on file     Non-medical: Not on file   Tobacco Use    Smoking status: Never Smoker    Smokeless tobacco: Never Used   Substance and Sexual Activity    Alcohol use: No    Drug use: No    Sexual activity: Never   Lifestyle    Physical activity:     Days per week: Not on file     Minutes per session: Not on file    Stress: Not on file   Relationships    Social connections:     Talks on phone: Not on file     Gets together: Not on file     Attends Advent service: Not on file     Active member of club or organization: Not on file     Attends meetings of clubs or organizations: Not on file     Relationship status: Not on file    Intimate partner violence:     Fear of current or ex partner: Not on file     Emotionally abused: Not on file     Physically abused: Not on file     Forced sexual activity: Not on file   Other Topics Concern    Not on file   Social History Narrative    Not on file       Problem List  Patient Active Problem List   Diagnosis Code    HTN (hypertension) I10    Hyperlipidemia E78.5    Family history of heart attack, premature, brother 46s Z80.55    Prediabetes R78.1    Family history of diabetes mellitus type II, mother Z80.1    Vitamin D deficiency E55.9    ACP (advance care planning) Z70.80    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    Obesity, morbid ([facility] [address] E66.01    Acute confusion R41.0    Altered mental status R41.82    Positive serology for syphilis A53.0       Review of Systems  Review of Systems   Constitutional: Negative for chills and fever. Eyes: Negative for blurred vision. Respiratory: Negative for shortness of breath. Cardiovascular: Positive for leg swelling. Negative for chest pain. Gastrointestinal: Negative for abdominal pain, nausea and vomiting. Neurological: Negative for dizziness. Vital Signs  Vitals:    11/06/19 1101   BP: 118/75   Pulse: 79   Resp: 16   Temp: 98 °F (36.7 °C)   TempSrc: Oral   SpO2: 98%   Weight: 211 lb (95.7 kg)   Height: 5' (1.524 m)   PainSc:   0 - No pain       Physical Exam  Physical Exam   Constitutional: She is oriented to person, place, and time. HENT:   Mouth/Throat: Oropharynx is clear and moist.   Eyes: Pupils are equal, round, and reactive to light. Cardiovascular: Normal rate and regular rhythm. Pulmonary/Chest: Effort normal and breath sounds normal. No respiratory distress. Musculoskeletal: She exhibits edema. Right lower extremity   Neurological: She is alert and oriented to person, place, and time. Psychiatric: She has a normal mood and affect. Her behavior is normal. Judgment and thought content normal.       Diagnostics  Orders Placed This Encounter    DEXA BONE DENSITY STUDY AXIAL     Standing Status:   Future     Standing Expiration Date:   12/6/2020     Order Specific Question:   Reason for Exam     Answer:   screening for osteoporosis - post menopausal    Influenza Vaccine Inactivated (IIV)(FLUAD), Subunit, Adjuvanted, IM, (11749)    Administration fee () for Medicare insured patients    lisinopril (PRINIVIL, ZESTRIL) 20 mg tablet     Sig: Take 1 Tab by mouth daily.      Dispense:  90 Tab     Refill:  1       Results  Results for orders placed or performed in visit on 06/26/19   CBC WITH AUTOMATED DIFF   Result Value Ref Range    WBC 3.5 3.4 - 10.8 x10E3/uL    RBC 4.90 3.77 - 5.28 x10E6/uL    HGB 11.1 11.1 - 15.9 g/dL    HCT 37.0 34.0 - 46.6 %    MCV 76 (L) 79 - 97 fL    MCH 22.7 (L) 26.6 - 33.0 pg    MCHC 30.0 (L) 31.5 - 35.7 g/dL    RDW 17.5 (H) 12.3 - 15.4 %    PLATELET 663 388 - 724 x10E3/uL    NEUTROPHILS 53 Not Estab. %    Lymphocytes 33 Not Estab. %    MONOCYTES 10 Not Estab. %    EOSINOPHILS 3 Not Estab. %    BASOPHILS 1 Not Estab. %    ABS. NEUTROPHILS 1.9 1.4 - 7.0 x10E3/uL    Abs Lymphocytes 1.1 0.7 - 3.1 x10E3/uL    ABS. MONOCYTES 0.3 0.1 - 0.9 x10E3/uL    ABS. EOSINOPHILS 0.1 0.0 - 0.4 x10E3/uL    ABS. BASOPHILS 0.0 0.0 - 0.2 x10E3/uL    IMMATURE GRANULOCYTES 0 Not Estab. %    ABS. IMM. GRANS. 0.0 0.0 - 0.1 [id number]/SF   METABOLIC PANEL, COMPREHENSIVE   Result Value Ref Range    Glucose 89 65 - 99 mg/dL    BUN 11 8 - 27 mg/dL    Creatinine 0.84 0.57 - 1.00 mg/dL    GFR est non-AA 72 >59 mL/min/1.73    GFR est AA 83 >59 mL/min/1.73    BUN/Creatinine ratio 13 12 - 28    Sodium 143 134 - 144 mmol/L    Potassium 3.9 3.5 - 5.2 mmol/L    Chloride 106 96 - 106 mmol/L    CO2 22 20 - 29 mmol/L    Calcium 9.1 8.7 - 10.3 mg/dL    Protein, total 6.9 6.0 - 8.5 g/dL    Albumin 3.6 3.6 - 4.8 g/dL    GLOBULIN, TOTAL 3.3 1.5 - 4.5 g/dL    A-G Ratio 1.1 (L) 1.2 - 2.2    Bilirubin, total 0.3 0.0 - 1.2 mg/dL    Alk. phosphatase 87 39 - 117 IU/L    AST (SGOT) 16 0 - 40 IU/L    ALT (SGPT) 11 0 - 32 IU/L   LIPID PANEL   Result Value Ref Range    Cholesterol, total 224 (H) 100 - 199 mg/dL    Triglyceride 70 0 - 149 mg/dL    HDL Cholesterol 73 >39 mg/dL    VLDL, calculated 14 5 - 40 mg/dL    LDL, calculated 137 (H) 0 - 99 mg/dL   TSH 3RD GENERATION   Result Value Ref Range    TSH 1.070 0.450 - 4.500 uIU/mL   CVD REPORT   Result Value Ref Range    INTERPRETATION Note        Assessment and Plan  Diagnoses and all orders for this visit:    1. Essential hypertension  -     lisinopril (PRINIVIL, ZESTRIL) 20 mg tablet; Take 1 Tab by mouth daily. 2. BMI 40.0-44.9, adult ([facility] [address]    3.  Post-menopausal  -     DEXA BONE DENSITY STUDY AXIAL; Future    4. Hyperlipidemia, unspecified hyperlipidemia type    5. Encounter for immunization  -     INFLUENZA VACCINE INACTIVATED (IIV), SUBUNIT, ADJUVANTED, IM  -     ADMIN INFLUENZA VIRUS VAC    Discussed health maintenance. Offered vascular referral. Patient has declined. After care summary printed and reviewed with patient. Plan reviewed with patient. Questions answered. Patient verbalized understanding of plan and is in agreement with plan. Patient to follow up in one month or earlier if symptoms worsen. Follow-up and Dispositions    · Return in about 1 month (around 12/6/2019), or if symptoms worsen or fail to improve, for Medicare Wellness Visit. MARTHA Adams    Discussed the patient's BMI with her. The BMI follow up plan is as follows:     dietary management education, guidance, and counseling  encourage exercise  monitor weight  prescribed dietary intake    An After Visit Summary was printed and given to the patient.

## 2019-11-06 NOTE — PROGRESS NOTES
Judy Shankar presents today for   Chief Complaint   Patient presents with    Hypertension    Cholesterol Problem     high chol    Other     schizophrenia       Is someone accompanying this pt? no    Is the patient using any DME equipment during OV? no    Depression Screening:  3 most recent PHQ Screens 6/26/2019   Little interest or pleasure in doing things Not at all   Feeling down, depressed, irritable, or hopeless Not at all   Total Score PHQ 2 0       Learning Assessment:  Learning Assessment 12/27/2018   PRIMARY LEARNER Patient   HIGHEST LEVEL OF EDUCATION - PRIMARY LEARNER  SOME COLLEGE   BARRIERS PRIMARY LEARNER NONE   CO-LEARNER CAREGIVER No   PRIMARY LANGUAGE ENGLISH   LEARNER PREFERENCE PRIMARY LISTENING     -     -   ANSWERED BY self   RELATIONSHIP SELF       Abuse Screening:  Abuse Screening Questionnaire 6/26/2019   Do you ever feel afraid of your partner? N   Are you in a relationship with someone who physically or mentally threatens you? N   Is it safe for you to go home? Y       Fall Screening  Fall Risk Assessment, last 12 mths 6/26/2019   Able to walk? Yes   Fall in past 12 months? No       Generalized Anxiety  No flowsheet data found. Health Maintenance Due   Topic Date Due    DTaP/Tdap/Td series (1 - Tdap) 01/17/1973    Shingrix Vaccine Age 50> (1 of 2) 01/17/2002    GLAUCOMA SCREENING Q2Y  01/17/2017    Bone Densitometry (Dexa) Screening  01/17/2017    MEDICARE YEARLY EXAM  03/14/2018    Influenza Age 9 to Adult  08/01/2019    BREAST CANCER SCRN MAMMOGRAM  08/08/2019   . Health Maintenance reviewed and discussed and ordered per Provider. Coordination of Care  1. Have you been to the ER, urgent care clinic since your last visit? Hospitalized since your last visit? no    2. Have you seen or consulted any other health care providers outside of the [address] [place] [address] since your last visit? Include any pap smears or colon screening.  no      Advance Directive discussed 8/6/19

## 2019-11-20 ENCOUNTER — DOCUMENTATION ONLY (OUTPATIENT)
Dept: FAMILY MEDICINE CLINIC | Age: 69
End: 2019-11-20

## 2019-11-20 NOTE — PROGRESS NOTES
Called patient with mammogram and bone density appt information. Mammo is scheduled for 12/4/19 at 12:15 and bone density 1:00. Need to check in at 12:00 at the [facility].  Patient stated she understood

## 2020-01-09 ENCOUNTER — OFFICE VISIT (OUTPATIENT)
Dept: FAMILY MEDICINE CLINIC | Age: 70
End: 2020-01-09

## 2020-01-09 VITALS
OXYGEN SATURATION: 99 % | HEIGHT: 60 IN | DIASTOLIC BLOOD PRESSURE: 84 MMHG | WEIGHT: 208.4 LBS | RESPIRATION RATE: 20 BRPM | BODY MASS INDEX: 40.91 KG/M2 | HEART RATE: 71 BPM | SYSTOLIC BLOOD PRESSURE: 134 MMHG | TEMPERATURE: 97.6 F

## 2020-01-09 DIAGNOSIS — Z71.89 ADVANCE CARE PLANNING: ICD-10-CM

## 2020-01-09 DIAGNOSIS — R73.03 PREDIABETES: ICD-10-CM

## 2020-01-09 DIAGNOSIS — E78.5 HYPERLIPIDEMIA, UNSPECIFIED HYPERLIPIDEMIA TYPE: ICD-10-CM

## 2020-01-09 DIAGNOSIS — Z00.00 MEDICARE ANNUAL WELLNESS VISIT, SUBSEQUENT: Primary | ICD-10-CM

## 2020-01-09 DIAGNOSIS — I10 ESSENTIAL HYPERTENSION: ICD-10-CM

## 2020-01-09 NOTE — PROGRESS NOTES
This is the Subsequent Medicare Annual Wellness Exam, performed 12 months or more after the Initial AWV or the last Subsequent AWV    I have reviewed the patient's medical history in detail and updated the computerized patient record. 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    Obesity, morbid ([address] E66.01    Acute confusion R41.0    Altered mental status R41.82    Positive serology for syphilis A53.0     Past Medical History:   Diagnosis Date    Asthma     Hypertension     Latent syphilis 3/22/2017    Osteoarthritis of ankle     Right ankle pain     posterior tibial tendon interstitial tear    Right foot pain     hindfoot arthrosis    Sleep disorder       Past Surgical History:   Procedure Laterality Date    HX  SECTION       Current Outpatient Medications   Medication Sig Dispense Refill    lisinopril (PRINIVIL, ZESTRIL) 20 mg tablet Take 1 Tab by mouth daily. 90 Tab 1    furosemide (LASIX) 40 mg tablet TAKE 1 TABLET BY MOUTH ONCE DAILY 30 Tab 3    Nebulizer Accessories kit To use with nebulizer 1 Kit 0    Nebulizer & Compressor machine 1 Each by Does Not Apply route daily as needed. 1 Each 0    VENTOLIN HFA 90 mcg/actuation inhaler INHALE 1-2 PUFFS EVERY 4HRS AS NEEDED FOR WHEEZE  0    FLOVENT DISKUS 100 mcg/actuation dsdv INHALE 1 PUFF BY MOUTH TWICE DAILY 1 Each 2    loratadine (CLARITIN REDITABS) 10 mg dissolvable tablet Take 1 Tab by mouth daily. 30 Tab 0    potassium chloride SR (K-TAB) 20 mEq tablet Take 1 Tab by mouth daily. 30 Tab 3    aspirin delayed-release 81 mg tablet Take  by mouth daily.       acetaminophen (TYLENOL ARTHRITIS) 650 mg CR tablet Take 650 mg by mouth every six (6) hours as needed for Pain. No Known Allergies    Family History   Problem Relation Age of Onset    Diabetes Mother     Hypertension Mother     Heart Disease Father     Hypertension Father     Gout Father     Heart Attack Brother     Schizophrenia Brother      Social History     Tobacco Use    Smoking status: Never Smoker    Smokeless tobacco: Never Used   Substance Use Topics    Alcohol use: No       Depression Risk Factor Screening:     3 most recent PHQ Screens 1/9/2020   Little interest or pleasure in doing things Not at all   Feeling down, depressed, irritable, or hopeless Not at all   Total Score PHQ 2 0       Alcohol Risk Factor Screening:   Do you average 1 drink per night or more than 7 drinks a week:  No    On any one occasion in the past three months have you have had more than 3 drinks containing alcohol:  No      Functional Ability and Level of Safety:   Hearing: Hearing is good. Activities of Daily Living: The home contains: no safety equipment. Patient does total self care    Ambulation: with no difficulty    Fall Risk:  Fall Risk Assessment, last 12 mths 1/9/2020   Able to walk? Yes   Fall in past 12 months?  No       Abuse Screen:  Patient is not abused    Cognitive Screening   Has your family/caregiver stated any concerns about your memory: no  Cognitive Screening: Normal - MMSE (Mini Mental Status Exam)    Patient Care Team   Patient Care Team:  Beatriz Tran NP as PCP - General (Nurse Practitioner)  Beatriz Tran NP as PCP - [facility] Empaneled Provider  Mar Boeck, MD (Pulmonary Disease)    Assessment/Plan   Education and counseling provided:  Are appropriate based on today's review and evaluation  End-of-Life planning (with patient's consent)   Pneumococcal Vaccine  Influenza Vaccine  Hepatitis B Vaccine  Screening Mammography  Screening Pap and pelvic (covered once every 2 years)  Colorectal cancer screening tests  Cardiovascular screening blood test  Bone mass measurement (DEXA)  Screening for glaucoma  Diabetes screening test  mental health and importance of medication managment which patient has declined. Reviewed diagnosis of HTN, hyperlipidemia, and prediabetes. Diagnoses and all orders for this visit:    1. Medicare annual wellness visit, subsequent    2. Prediabetes  -      DIABETES EYE EXAM    3. Essential hypertension    4. Hyperlipidemia, unspecified hyperlipidemia type    5. BMI 40.0-44.9, adult ([address]    6.  Advance care planning    MARTHA Calhoun    Health Maintenance Due   Topic Date Due    DTaP/Tdap/Td series (1 - Tdap) 01/17/1963    Shingrix Vaccine Age 50> (1 of 2) 01/17/2002    GLAUCOMA SCREENING Q2Y  01/17/2017    Bone Densitometry (Dexa) Screening  01/17/2017    BREAST CANCER SCRN MAMMOGRAM  08/08/2019     Ru Dumont

## 2020-01-09 NOTE — PROGRESS NOTES
Paige Abbott presents today for   Chief Complaint   Patient presents with   [employer] Wellness Visit     Medicare       Is someone accompanying this pt? no    Is the patient using any DME equipment during [address]? no    Depression Screening:  3 most recent PHQ Screens 1/9/2020   Little interest or pleasure in doing things Not at all   Feeling down, depressed, irritable, or hopeless Not at all   Total Score PHQ 2 0       Learning Assessment:  Learning Assessment 1/9/2020   PRIMARY LEARNER Patient   HIGHEST LEVEL OF EDUCATION - PRIMARY LEARNER  SOME [address] PRIMARY LEARNER NONE   CO-LEARNER CAREGIVER No   PRIMARY LANGUAGE ENGLISH    NEED No   LEARNER PREFERENCE PRIMARY DEMONSTRATION     -     -   ANSWERED BY patient   RELATIONSHIP SELF       Abuse Screening:  Abuse Screening Questionnaire 1/9/2020   Do you ever feel afraid of your partner? N   Are you in a relationship with someone who physically or mentally threatens you? N   Is it safe for you to go home? Y       Fall Screening  Fall Risk Assessment, last 12 mths 1/9/2020   Able to walk? Yes   Fall in past 12 months? No       Generalized Anxiety  No flowsheet data found. Health Maintenance Due   Topic Date Due    DTaP/Tdap/Td series (1 - Tdap) 01/17/1963    Shingrix Vaccine Age 50> (1 of 2) 01/17/2002    GLAUCOMA SCREENING Q2Y  01/17/2017    Bone Densitometry (Dexa) Screening  01/17/2017    MEDICARE YEARLY EXAM  03/14/2018    BREAST CANCER SCRN MAMMOGRAM  08/08/2019   . Health Maintenance reviewed and discussed and ordered per Provider. Coordination of Care  1. Have you been to the ER, urgent care clinic since your last visit? Hospitalized since your last visit? no    2. Have you seen or consulted any other health care providers outside of the [address] [place] [address] since your last visit? Include any pap smears or colon screening. no      Advance Directive:  1. Do you have an advance directive in place?  Patient Reply:no    2. If not, would you like material regarding how to put one in place?  Patient Reply: yes

## 2020-01-09 NOTE — ACP (ADVANCE CARE PLANNING)
Advance Care Planning (ACP) Provider Note - Comprehensive     Date of ACP Conversation: 01/09/20  Persons included in Conversation:  patient  Length of ACP Conversation in minutes:  20 minutes    Authorized Decision Maker (if patient is incapable of making informed decisions): This person is:  patient is her own decision maker            General ACP for ALL Patients with Decision Making Capacity:   Importance of advance care planning, including choosing a healthcare agent to communicate patient's healthcare decisions if patient lost the ability to make decisions, such as after a sudden illness or accident    Review of Existing Advance Directive:  Patient does not have an advance directive    For Serious or Chronic Illness:  Understanding of medical condition    Understanding of CPR, goals and expected outcomes, benefits and burdens discussed. Information on CPR success rates provided (e.g. for CPR in hospital, survival to d/c at two weeks is 22%, for chronically ill or elderly/frail survival is less than 3%); Individual asked to communicate understanding of information in his/her own words. Explored fears and concerns regarding CPR or possible outcomes    Interventions Provided:  Recommended completion of Advance Directive form after review of ACP materials and conversation with prospective healthcare agent        Discussed organ donation and [employer] 's license.      KWAME SosaC

## 2020-01-09 NOTE — PATIENT INSTRUCTIONS

## 2020-01-30 ENCOUNTER — HOSPITAL ENCOUNTER (OUTPATIENT)
Dept: MAMMOGRAPHY | Age: 70
Discharge: HOME | End: 2020-01-30
Attending: NURSE PRACTITIONER
Payer: MEDICARE

## 2020-01-30 ENCOUNTER — HOSPITAL ENCOUNTER (OUTPATIENT)
Dept: MAMMOGRAPHY | Age: 70
End: 2020-01-30
Attending: NURSE PRACTITIONER
Payer: MEDICARE

## 2020-01-30 DIAGNOSIS — Z12.31 BREAST CANCER SCREENING BY MAMMOGRAM: ICD-10-CM

## 2020-01-30 PROCEDURE — 77067 SCR MAMMO BI INCL CAD: CPT

## 2020-06-01 ENCOUNTER — HOSPITAL ENCOUNTER (OUTPATIENT)
Dept: LAB | Age: 70
Discharge: HOME | End: 2020-06-01
Payer: MEDICARE

## 2020-06-01 DIAGNOSIS — R60.0 BILATERAL LEG EDEMA: ICD-10-CM

## 2020-06-01 DIAGNOSIS — E78.5 HYPERLIPIDEMIA, UNSPECIFIED HYPERLIPIDEMIA TYPE: ICD-10-CM

## 2020-06-01 DIAGNOSIS — J45.909 UNCOMPLICATED ASTHMA, UNSPECIFIED ASTHMA SEVERITY, UNSPECIFIED WHETHER PERSISTENT: ICD-10-CM

## 2020-06-01 DIAGNOSIS — I10 ESSENTIAL HYPERTENSION: ICD-10-CM

## 2020-06-01 LAB
ALBUMIN SERPL-MCNC: 3.5 G/DL (ref 3.4–5)
ALBUMIN/GLOB SERPL: 0.9 {RATIO} (ref 0.8–1.7)
ALP SERPL-CCNC: 97 U/L (ref 45–117)
ALT SERPL-CCNC: 17 U/L (ref 13–56)
ANION GAP SERPL CALC-SCNC: 4 MMOL/L (ref 3–18)
AST SERPL-CCNC: 15 U/L (ref 10–38)
BASOPHILS # BLD: 0 K/UL (ref 0–0.1)
BASOPHILS NFR BLD: 1 % (ref 0–2)
BILIRUB SERPL-MCNC: 0.5 MG/DL (ref 0.2–1)
BUN SERPL-MCNC: 9 MG/DL (ref 7–18)
BUN/CREAT SERPL: 10 (ref 12–20)
CALCIUM SERPL-MCNC: 8.6 MG/DL (ref 8.5–10.1)
CHLORIDE SERPL-SCNC: 108 MMOL/L (ref 100–111)
CHOLEST SERPL-MCNC: 235 MG/DL
CO2 SERPL-SCNC: 29 MMOL/L (ref 21–32)
CREAT SERPL-MCNC: 0.88 MG/DL (ref 0.6–1.3)
DIFFERENTIAL METHOD BLD: ABNORMAL
EOSINOPHIL # BLD: 0.1 K/UL (ref 0–0.4)
EOSINOPHIL NFR BLD: 3 % (ref 0–5)
ERYTHROCYTE [DISTWIDTH] IN BLOOD BY AUTOMATED COUNT: 16.5 % (ref 11.6–14.5)
GLOBULIN SER CALC-MCNC: 4 G/DL (ref 2–4)
GLUCOSE SERPL-MCNC: 71 MG/DL (ref 74–99)
HCT VFR BLD AUTO: 37.7 % (ref 35–45)
HDLC SERPL-MCNC: 78 MG/DL (ref 40–60)
HDLC SERPL: 3 {RATIO} (ref 0–5)
HGB BLD-MCNC: 11.9 G/DL (ref 12–16)
LDLC SERPL CALC-MCNC: 142 MG/DL (ref 0–100)
LIPID PROFILE,FLP: ABNORMAL
LYMPHOCYTES # BLD: 1.3 K/UL (ref 0.9–3.6)
LYMPHOCYTES NFR BLD: 33 % (ref 21–52)
MCH RBC QN AUTO: 23 PG (ref 24–34)
MCHC RBC AUTO-ENTMCNC: 31.6 G/DL (ref 31–37)
MCV RBC AUTO: 72.9 FL (ref 74–97)
MONOCYTES # BLD: 0.4 K/UL (ref 0.05–1.2)
MONOCYTES NFR BLD: 10 % (ref 3–10)
NEUTS SEG # BLD: 2.2 K/UL (ref 1.8–8)
NEUTS SEG NFR BLD: 53 % (ref 40–73)
PLATELET # BLD AUTO: 181 K/UL (ref 135–420)
PMV BLD AUTO: 11.1 FL (ref 9.2–11.8)
POTASSIUM SERPL-SCNC: 3.9 MMOL/L (ref 3.5–5.5)
PROT SERPL-MCNC: 7.5 G/DL (ref 6.4–8.2)
RBC # BLD AUTO: 5.17 M/UL (ref 4.2–5.3)
RBC MORPH BLD: ABNORMAL
SODIUM SERPL-SCNC: 141 MMOL/L (ref 136–145)
TRIGL SERPL-MCNC: 75 MG/DL (ref ?–150)
TSH SERPL DL<=0.05 MIU/L-ACNC: 0.84 UIU/ML (ref 0.36–3.74)
VLDLC SERPL CALC-MCNC: 15 MG/DL
WBC # BLD AUTO: 4 K/UL (ref 4.6–13.2)

## 2020-06-01 PROCEDURE — 36415 COLL VENOUS BLD VENIPUNCTURE: CPT

## 2020-06-01 PROCEDURE — 80053 COMPREHEN METABOLIC PANEL: CPT

## 2020-06-01 PROCEDURE — 84443 ASSAY THYROID STIM HORMONE: CPT

## 2020-06-01 PROCEDURE — 80061 LIPID PANEL: CPT

## 2020-06-01 PROCEDURE — 85025 COMPLETE CBC W/AUTO DIFF WBC: CPT

## 2020-07-08 NOTE — PROGRESS NOTES
Letter out. Appointment on 8/10/20.  [facility]

## 2020-10-06 ENCOUNTER — VIRTUAL VISIT (OUTPATIENT)
Dept: FAMILY MEDICINE CLINIC | Age: 70
End: 2020-10-06
Payer: MEDICARE

## 2020-10-06 DIAGNOSIS — E11.8 CONTROLLED TYPE 2 DIABETES MELLITUS WITH COMPLICATION, WITHOUT LONG-TERM CURRENT USE OF INSULIN (HCC): ICD-10-CM

## 2020-10-06 DIAGNOSIS — E78.5 HYPERLIPIDEMIA, UNSPECIFIED HYPERLIPIDEMIA TYPE: ICD-10-CM

## 2020-10-06 DIAGNOSIS — I10 ESSENTIAL HYPERTENSION: Primary | ICD-10-CM

## 2020-10-06 DIAGNOSIS — R60.0 BILATERAL LEG EDEMA: ICD-10-CM

## 2020-10-06 DIAGNOSIS — F25.0 SCHIZOAFFECTIVE DISORDER, BIPOLAR TYPE (HCC): ICD-10-CM

## 2020-10-06 PROCEDURE — 99442 PR PHYS/QHP TELEPHONE EVALUATION 11-20 MIN: CPT | Performed by: NURSE PRACTITIONER

## 2020-10-06 NOTE — PROGRESS NOTES
Steven Larry is a 78 y.o. female, evaluated via audio-only technology on 10/6/2020 for Hypertension   Reports she had some shortness of breath in September and she went to the ER. She reports they gave her some pills that cleared her symptoms. She denies any current chest pain and or shortness of breath. She currently denies any fevers, cough, and or congestion. She reports she is only using her inhalers as needed and she states she has not needed them in a while so she has not taken them. She reports she is still has a little swelling in her lower extremities. She states she is taking the Lasix as prescribed. She reports she is taking her blood pressure medicines as prescribed currently. She states she does not check her blood pressure. She states she is not currently taking any medications for her schizophrenia and or her bipolar. She denies desire to hurt or harm herself and or others. She denies any suicidal ideations. She denies any hallucinations. She reports that the ulcer that was on her leg has healed and she currently does not have any skin breakdown. Assessment & Plan:   Diagnoses and all orders for this visit:    1. Essential hypertension  -     METABOLIC PANEL, COMPREHENSIVE; Future  -     LIPID PANEL; Future  -     CBC WITH AUTOMATED DIFF; Future  -     HEMOGLOBIN A1C WITH EAG; Future    2. Controlled type 2 diabetes mellitus with complication, without long-term current use of insulin (HCC)  -     METABOLIC PANEL, COMPREHENSIVE; Future  -     LIPID PANEL; Future  -     CBC WITH AUTOMATED DIFF; Future  -     HEMOGLOBIN A1C WITH EAG; Future    3. Schizoaffective disorder, bipolar type ([facility]    4. Hyperlipidemia, unspecified hyperlipidemia type  -     LIPID PANEL; Future  -     CBC WITH AUTOMATED DIFF; Future  -     HEMOGLOBIN A1C WITH EAG; Future    5. Bilateral leg edema  -     METABOLIC PANEL, COMPREHENSIVE; Future      She agrees to have labs completed.   I have discussed the importance of her having the flu shot and using her inhalers as prescribed due to her history of asthma exacerbation. She verbalizes understanding. 12  Subjective:       Prior to Admission medications    Medication Sig Start Date End Date Taking? Authorizing Provider   lisinopril (PRINIVIL, ZESTRIL) 20 mg tablet Take 1 Tab by mouth daily. 11/6/19  Yes Jarad VALDERRAMA NP   furosemide (LASIX) 40 mg tablet TAKE 1 TABLET BY MOUTH ONCE DAILY 3/13/19  Yes Jarad VALDERRAMA NP   FLOVENT DISKUS 100 mcg/actuation dsdv INHALE 1 PUFF BY MOUTH TWICE DAILY 7/15/18  Yes Jody Mcneill NP   Nebulizer Accessories kit To use with nebulizer 6/6/18  Yes Jody Mcneill NP   Nebulizer & Compressor machine 1 Each by Does Not Apply route daily as needed. 6/6/18  Yes Jarad VALDERRAMA NP   VENTOLIN HFA 90 mcg/actuation inhaler INHALE 1-2 PUFFS EVERY 4HRS AS NEEDED FOR WHEEZE 5/6/18  Yes Provider, Historical   loratadine (CLARITIN REDITABS) 10 mg dissolvable tablet Take 1 Tab by mouth daily. 5/14/18  Yes Jarad VALDERRAMA NP   potassium chloride SR (K-TAB) 20 mEq tablet Take 1 Tab by mouth daily. 1/17/18  Yes Jarad VALDERRAMA NP   aspirin delayed-release 81 mg tablet Take  by mouth daily. Yes Provider, Historical   acetaminophen (TYLENOL ARTHRITIS) 650 mg CR tablet Take 650 mg by mouth every six (6) hours as needed for Pain.    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    Obesity, morbid ([facility] E66.01    Acute confusion R41.0    Altered mental status R41.82    Positive serology for syphilis A53.0     Patient Active Problem List    Diagnosis Date Noted    Obesity, morbid ([facility] 11/27/2017    Latent early syphilis 2017    Acute confusion 2016    Altered mental status 2016    Positive serology for syphilis 2016    ACP (advance care planning) 2016    Prediabetes 10/08/2014    Family history of diabetes mellitus type II, mother 10/08/2014    Vitamin D deficiency 10/08/2014    HTN (hypertension) 2014    Hyperlipidemia 2014    Family history of heart attack, premature, brother 46s 2014     Current Outpatient Medications   Medication Sig Dispense Refill    lisinopril (PRINIVIL, ZESTRIL) 20 mg tablet Take 1 Tab by mouth daily. 90 Tab 1    furosemide (LASIX) 40 mg tablet TAKE 1 TABLET BY MOUTH ONCE DAILY 30 Tab 3    FLOVENT DISKUS 100 mcg/actuation dsdv INHALE 1 PUFF BY MOUTH TWICE DAILY 1 Each 2    Nebulizer Accessories kit To use with nebulizer 1 Kit 0    Nebulizer & Compressor machine 1 Each by Does Not Apply route daily as needed. 1 Each 0    VENTOLIN HFA 90 mcg/actuation inhaler INHALE 1-2 PUFFS EVERY 4HRS AS NEEDED FOR WHEEZE  0    loratadine (CLARITIN REDITABS) 10 mg dissolvable tablet Take 1 Tab by mouth daily. 30 Tab 0    potassium chloride SR (K-TAB) 20 mEq tablet Take 1 Tab by mouth daily. 30 Tab 3    aspirin delayed-release 81 mg tablet Take  by mouth daily.  acetaminophen (TYLENOL ARTHRITIS) 650 mg CR tablet Take 650 mg by mouth every six (6) hours as needed for Pain.        No Known Allergies  Past Medical History:   Diagnosis Date    Asthma     Hypertension     Latent syphilis 3/22/2017    Osteoarthritis of ankle     Right ankle pain     posterior tibial tendon interstitial tear    Right foot pain     hindfoot arthrosis    Sleep disorder      Past Surgical History:   Procedure Laterality Date    HX  SECTION       Family History   Problem Relation Age of Onset    Diabetes Mother     Hypertension Mother     Heart Disease Father     Hypertension Father     Gout Father     Heart Attack Brother     Schizophrenia Brother      Social History     Tobacco Use    Smoking status: Never Smoker    Smokeless tobacco: Never Used   Substance Use Topics    Alcohol use: No       Review of Systems   Constitutional: Negative for fever. HENT: Negative for congestion. Respiratory: Negative for cough and shortness of breath. Cardiovascular: Negative for chest pain. Psychiatric/Behavioral: Negative for hallucinations and suicidal ideas. No flowsheet data found. Leela Gómez, who was evaluated through a patient-initiated, synchronous (real-time) audio only encounter, and/or her healthcare decision maker, is aware that it is a billable service, with coverage as determined by her insurance carrier. She provided verbal consent to proceed: Yes. She has not had a related appointment within my department in the past 7 days or scheduled within the next 24 hours.       Total Time: minutes: 11-20 minutes    Anjelica Carrasco NP

## 2020-10-06 NOTE — PROGRESS NOTES
Melanie Bajwa presents today for   Chief Complaint   Patient presents with    Hypertension       Is someone accompanying this pt? no    Is the patient using any DME equipment during OV? no    Depression Screening:  3 most recent PHQ Screens 1/9/2020   Little interest or pleasure in doing things Not at all   Feeling down, depressed, irritable, or hopeless Not at all   Total Score PHQ 2 0       Learning Assessment:  Learning Assessment 1/9/2020   PRIMARY LEARNER Patient   HIGHEST LEVEL OF EDUCATION - PRIMARY LEARNER  SOME COLLEGE   BARRIERS PRIMARY LEARNER NONE   CO-LEARNER CAREGIVER No   PRIMARY LANGUAGE ENGLISH    NEED No   LEARNER PREFERENCE PRIMARY DEMONSTRATION     -     -   ANSWERED BY patient   RELATIONSHIP SELF       Abuse Screening:  Abuse Screening Questionnaire 1/9/2020   Do you ever feel afraid of your partner? N   Are you in a relationship with someone who physically or mentally threatens you? N   Is it safe for you to go home? Y       Fall Screening  Fall Risk Assessment, last 12 mths 1/9/2020   Able to walk? Yes   Fall in past 12 months? No       Generalized Anxiety  No flowsheet data found. Health Maintenance Due   Topic Date Due    DTaP/Tdap/Td series (1 - Tdap) 01/17/1973    Shingrix Vaccine Age 50> (1 of 2) 01/17/2002    GLAUCOMA SCREENING Q2Y  01/17/2017    Bone Densitometry (Dexa) Screening  01/17/2017    A1C test (Diabetic or Prediabetic)  12/26/2019    Flu Vaccine (1) 09/01/2020   . Health Maintenance reviewed and discussed and ordered per Provider. Coordination of Care  1. Have you been to the ER, urgent care clinic since your last visit? Hospitalized since your last visit? no    2. Have you seen or consulted any other health care providers outside of the [address] [place] [address] since your last visit? Include any pap smears or colon screening.  no      Advance Directive:  Discussed 1/9/20

## 2020-10-22 ENCOUNTER — HOSPITAL ENCOUNTER (OUTPATIENT)
Dept: LAB | Age: 70
Discharge: HOME | End: 2020-10-22
Payer: MEDICARE

## 2020-10-22 DIAGNOSIS — I10 ESSENTIAL HYPERTENSION: ICD-10-CM

## 2020-10-22 DIAGNOSIS — E11.8 CONTROLLED TYPE 2 DIABETES MELLITUS WITH COMPLICATION, WITHOUT LONG-TERM CURRENT USE OF INSULIN (HCC): ICD-10-CM

## 2020-10-22 DIAGNOSIS — R60.0 BILATERAL LEG EDEMA: ICD-10-CM

## 2020-10-22 DIAGNOSIS — E78.5 HYPERLIPIDEMIA, UNSPECIFIED HYPERLIPIDEMIA TYPE: ICD-10-CM

## 2020-10-22 LAB
ALBUMIN SERPL-MCNC: 3.4 G/DL (ref 3.4–5)
ALBUMIN/GLOB SERPL: 1 {RATIO} (ref 0.8–1.7)
ALP SERPL-CCNC: 83 U/L (ref 45–117)
ALT SERPL-CCNC: 10 U/L (ref 13–56)
ANION GAP SERPL CALC-SCNC: 6 MMOL/L (ref 3–18)
AST SERPL-CCNC: 9 U/L (ref 10–38)
BASOPHILS # BLD: 0 K/UL (ref 0–0.06)
BASOPHILS NFR BLD: 0 % (ref 0–3)
BILIRUB SERPL-MCNC: 0.4 MG/DL (ref 0.2–1)
BUN SERPL-MCNC: 12 MG/DL (ref 7–18)
BUN/CREAT SERPL: 14 (ref 12–20)
CALCIUM SERPL-MCNC: 8.8 MG/DL (ref 8.5–10.1)
CHLORIDE SERPL-SCNC: 115 MMOL/L (ref 100–111)
CHOLEST SERPL-MCNC: 227 MG/DL
CO2 SERPL-SCNC: 27 MMOL/L (ref 21–32)
CREAT SERPL-MCNC: 0.87 MG/DL (ref 0.6–1.3)
DIFFERENTIAL METHOD BLD: ABNORMAL
EOSINOPHIL # BLD: 0.1 K/UL (ref 0–0.4)
EOSINOPHIL NFR BLD: 4 % (ref 0–5)
ERYTHROCYTE [DISTWIDTH] IN BLOOD BY AUTOMATED COUNT: 16.3 % (ref 11.6–14.5)
EST. AVERAGE GLUCOSE BLD GHB EST-MCNC: 114 MG/DL
GLOBULIN SER CALC-MCNC: 3.4 G/DL (ref 2–4)
GLUCOSE SERPL-MCNC: 82 MG/DL (ref 74–99)
HBA1C MFR BLD: 5.6 % (ref 4.2–5.6)
HCT VFR BLD AUTO: 36.9 % (ref 35–45)
HDLC SERPL-MCNC: 69 MG/DL (ref 40–60)
HDLC SERPL: 3.3 {RATIO} (ref 0–5)
HGB BLD-MCNC: 11.7 G/DL (ref 12–16)
LDLC SERPL CALC-MCNC: 146.4 MG/DL (ref 0–100)
LIPID PROFILE,FLP: ABNORMAL
LYMPHOCYTES # BLD: 0.9 K/UL (ref 0.8–3.5)
LYMPHOCYTES NFR BLD: 25 % (ref 20–51)
MCH RBC QN AUTO: 23.6 PG (ref 24–34)
MCHC RBC AUTO-ENTMCNC: 31.7 G/DL (ref 31–37)
MCV RBC AUTO: 74.4 FL (ref 74–97)
MONOCYTES # BLD: 0.4 K/UL (ref 0–1)
MONOCYTES NFR BLD: 13 % (ref 2–9)
NEUTS SEG # BLD: 2 K/UL (ref 1.8–8)
NEUTS SEG NFR BLD: 58 % (ref 42–75)
PLATELET # BLD AUTO: 165 K/UL (ref 135–420)
PLATELET COMMENTS,PCOM: ABNORMAL
POTASSIUM SERPL-SCNC: 3.8 MMOL/L (ref 3.5–5.5)
PROT SERPL-MCNC: 6.8 G/DL (ref 6.4–8.2)
RBC # BLD AUTO: 4.96 M/UL (ref 4.2–5.3)
RBC MORPH BLD: ABNORMAL
RBC MORPH BLD: ABNORMAL
SODIUM SERPL-SCNC: 148 MMOL/L (ref 136–145)
TRIGL SERPL-MCNC: 58 MG/DL (ref ?–150)
VLDLC SERPL CALC-MCNC: 11.6 MG/DL
WBC # BLD AUTO: 3.4 K/UL (ref 4.6–13.2)

## 2020-10-22 PROCEDURE — 80053 COMPREHEN METABOLIC PANEL: CPT

## 2020-10-22 PROCEDURE — 36415 COLL VENOUS BLD VENIPUNCTURE: CPT

## 2020-10-22 PROCEDURE — 83036 HEMOGLOBIN GLYCOSYLATED A1C: CPT

## 2020-10-22 PROCEDURE — 85025 COMPLETE CBC W/AUTO DIFF WBC: CPT

## 2020-10-22 PROCEDURE — 80061 LIPID PANEL: CPT

## 2020-11-23 ENCOUNTER — VIRTUAL VISIT (OUTPATIENT)
Dept: FAMILY MEDICINE CLINIC | Age: 70
End: 2020-11-23
Payer: MEDICARE

## 2020-11-23 DIAGNOSIS — Z71.89 COUNSELING ON HEALTH PROMOTION AND DISEASE PREVENTION: ICD-10-CM

## 2020-11-23 DIAGNOSIS — E11.8 CONTROLLED TYPE 2 DIABETES MELLITUS WITH COMPLICATION, WITHOUT LONG-TERM CURRENT USE OF INSULIN (HCC): ICD-10-CM

## 2020-11-23 DIAGNOSIS — I10 ESSENTIAL HYPERTENSION: Primary | ICD-10-CM

## 2020-11-23 DIAGNOSIS — E78.5 HYPERLIPIDEMIA, UNSPECIFIED HYPERLIPIDEMIA TYPE: ICD-10-CM

## 2020-11-23 DIAGNOSIS — D72.819 LEUKOPENIA, UNSPECIFIED TYPE: ICD-10-CM

## 2020-11-23 DIAGNOSIS — F25.0 SCHIZOAFFECTIVE DISORDER, BIPOLAR TYPE (HCC): ICD-10-CM

## 2020-11-23 PROCEDURE — 99441 PR PHYS/QHP TELEPHONE EVALUATION 5-10 MIN: CPT | Performed by: NURSE PRACTITIONER

## 2020-11-23 NOTE — PROGRESS NOTES
Lucero Carter presents today for   Chief Complaint   Patient presents with    Hypertension    Diabetes    Cholesterol Problem     high chol    Results     discuss lab results       Is someone accompanying this pt? no    Is the patient using any DME equipment during OV? no    Depression Screening:  3 most recent PHQ Screens 1/9/2020   Little interest or pleasure in doing things Not at all   Feeling down, depressed, irritable, or hopeless Not at all   Total Score PHQ 2 0       Learning Assessment:  Learning Assessment 1/9/2020   PRIMARY LEARNER Patient   HIGHEST LEVEL OF EDUCATION - PRIMARY LEARNER  SOME [address] PRIMARY LEARNER NONE   CO-LEARNER CAREGIVER No   PRIMARY LANGUAGE ENGLISH    NEED No   LEARNER PREFERENCE PRIMARY DEMONSTRATION     -     -   ANSWERED BY patient   RELATIONSHIP SELF       Abuse Screening:  Abuse Screening Questionnaire 1/9/2020   Do you ever feel afraid of your partner? N   Are you in a relationship with someone who physically or mentally threatens you? N   Is it safe for you to go home? Y       Fall Screening  Fall Risk Assessment, last 12 mths 1/9/2020   Able to walk? Yes   Fall in past 12 months? No       Generalized Anxiety  No flowsheet data found. Health Maintenance Due   Topic Date Due    Foot Exam Q1  01/17/1962    MICROALBUMIN Q1  01/17/1962    Eye Exam Retinal or Dilated  01/17/1962    DTaP/Tdap/Td series (1 - Tdap) 01/17/1973    Shingrix Vaccine Age 50> (1 of 2) 01/17/2002    GLAUCOMA SCREENING Q2Y  01/17/2017    Bone Densitometry (Dexa) Screening  01/17/2017    Flu Vaccine (1) 09/01/2020   . Health Maintenance reviewed and discussed and ordered per Provider. Coordination of Care  1. Have you been to the ER, urgent care clinic since your last visit? Hospitalized since your last visit? no    2. Have you seen or consulted any other health care providers outside of the [address] [place] [address] since your last visit?   Include any pap smears or colon screening.  no      Advance Directive:  Discussed 1/9/20

## 2020-11-23 NOTE — PROGRESS NOTES
Janice Doran is a 78 y.o. female, evaluated via audio-only technology on 11/23/2020 for Hypertension; Diabetes; Cholesterol Problem (high chol); and Results (discuss lab results)  Hypertension - She has not been able to check her blood pressure. She denies any chest pain and or shortness of breath. She reports she does not have swelling in her lower extremities. She denies any open cam on her legs. She reports she did receive the letter regarding her labs. She wants to know what she can eat to help her cholesterol. She denies any dizziness. She would like to know what her white blood cells do. Assessment & Plan:   Diagnoses and all orders for this visit:    1. Essential hypertension    2. Controlled type 2 diabetes mellitus with complication, without long-term current use of insulin ([facility])  -     MICROALBUMIN, UR, RAND W/ MICROALB/CREAT RATIO; Future    3. Schizoaffective disorder, bipolar type ([facility] [address]    4. Hyperlipidemia, unspecified hyperlipidemia type    5. Leukopenia, unspecified type  -     CBC WITH AUTOMATED DIFF; Future    6. Counseling on health promotion and disease prevention      I have discussed her labs with her in detail. She will repeat her CBC. She would like to wait on her shingles, foot exam, and bone density. She agrees to have her Tdap completed at her pharmacy. 12  Subjective:       Prior to Admission medications    Medication Sig Start Date End Date Taking? Authorizing Provider   lisinopril (PRINIVIL, ZESTRIL) 20 mg tablet Take 1 Tab by mouth daily. 11/6/19  Yes Meena Channel B, NP   furosemide (LASIX) 40 mg tablet TAKE 1 TABLET BY MOUTH ONCE DAILY 3/13/19  Yes Meena Channel B, NP   FLOVENT DISKUS 100 mcg/actuation dsdv INHALE 1 PUFF BY MOUTH TWICE DAILY 7/15/18  Yes Isi Alcala NP   Nebulizer Accessories kit To use with nebulizer 6/6/18  Yes Isi Alcala NP   Nebulizer & Compressor machine 1 Each by Does Not Apply route daily as needed.  6/6/18  Yes Vilma Velazco Anne VALDERRAMA NP   VENTOLIN HFA 90 mcg/actuation inhaler INHALE 1-2 PUFFS EVERY 4HRS AS NEEDED FOR WHEEZE 5/6/18  Yes Provider, Historical   aspirin delayed-release 81 mg tablet Take  by mouth daily. Yes Provider, Historical   acetaminophen (TYLENOL ARTHRITIS) 650 mg CR tablet Take 650 mg by mouth every six (6) hours as needed for Pain. Yes Provider, Historical   loratadine (CLARITIN REDITABS) 10 mg dissolvable tablet Take 1 Tab by mouth daily. 5/14/18   Mari VALDERRAMA NP   potassium chloride SR (K-TAB) 20 mEq tablet Take 1 Tab by mouth daily. 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    Obesity, morbid ([facility] [address] E66.01    Acute confusion R41.0    Altered mental status R41.82    Positive serology for syphilis A53.0     Patient Active Problem List    Diagnosis Date Noted    Obesity, morbid ([facility] [address] 11/27/2017    Latent early syphilis 01/04/2017    Acute confusion 12/22/2016    Altered mental status 12/22/2016    Positive serology for syphilis 12/22/2016    ACP (advance care planning) 11/21/2016    Prediabetes 10/08/2014    Family history of diabetes mellitus type II, mother 10/08/2014    Vitamin D deficiency 10/08/2014    HTN (hypertension) 08/26/2014    Hyperlipidemia 08/26/2014    Family history of heart attack, premature, brother 46s 08/26/2014     Current Outpatient Medications   Medication Sig Dispense Refill    lisinopril (PRINIVIL, ZESTRIL) 20 mg tablet Take 1 Tab by mouth daily.  90 Tab 1    furosemide (LASIX) 40 mg tablet TAKE 1 TABLET BY MOUTH ONCE DAILY 30 Tab 3    FLOVENT DISKUS 100 mcg/actuation dsdv INHALE 1 PUFF BY MOUTH TWICE DAILY 1 Each 2    Nebulizer Accessories kit To use with nebulizer 1 Kit 0    Nebulizer & Compressor machine 1 Each by Does Not Apply route daily as needed. 1 Each 0    VENTOLIN HFA 90 mcg/actuation inhaler INHALE 1-2 PUFFS EVERY 4HRS AS NEEDED FOR WHEEZE  0    aspirin delayed-release 81 mg tablet Take  by mouth daily.  acetaminophen (TYLENOL ARTHRITIS) 650 mg CR tablet Take 650 mg by mouth every six (6) hours as needed for Pain.  loratadine (CLARITIN REDITABS) 10 mg dissolvable tablet Take 1 Tab by mouth daily. 30 Tab 0    potassium chloride SR (K-TAB) 20 mEq tablet Take 1 Tab by mouth daily. 30 Tab 3     No Known Allergies  Past Medical History:   Diagnosis Date    Asthma     Hypertension     Latent syphilis 3/22/2017    Osteoarthritis of ankle     Right ankle pain     posterior tibial tendon interstitial tear    Right foot pain     hindfoot arthrosis    Sleep disorder      Past Surgical History:   Procedure Laterality Date    HX  SECTION       Family History   Problem Relation Age of Onset    Diabetes Mother     Hypertension Mother     Heart Disease Father     Hypertension Father     Gout Father     Heart Attack Brother     Schizophrenia Brother      Social History     Tobacco Use    Smoking status: Never Smoker    Smokeless tobacco: Never Used   Substance Use Topics    Alcohol use: No       Review of Systems   Constitutional: Negative for fever. HENT: Negative for congestion. Respiratory: Negative for cough and shortness of breath. Cardiovascular: Negative for chest pain and leg swelling. Gastrointestinal: Negative for abdominal pain, nausea and vomiting. Musculoskeletal: Negative for falls. Neurological: Negative for dizziness. No flowsheet data found.      Leela Gómez, who was evaluated through a patient-initiated, synchronous (real-time) audio only encounter, and/or her healthcare decision maker, is aware that it is a billable service, with coverage as determined by her insurance carrier. She provided verbal consent to proceed: Yes. She has not had a related appointment within my department in the past 7 days or scheduled within the next 24 hours.       Total Time: minutes: 5-10 minutes    July Broussard NP

## 2020-12-08 DIAGNOSIS — I10 ESSENTIAL HYPERTENSION: ICD-10-CM

## 2020-12-08 RX ORDER — LISINOPRIL 20 MG/1
TABLET ORAL
Qty: 90 TAB | Refills: 0 | Status: SHIPPED | OUTPATIENT
Start: 2020-12-08 | End: 2021-03-05 | Stop reason: SDUPTHER

## 2021-01-05 DIAGNOSIS — R60.0 BILATERAL LEG EDEMA: ICD-10-CM

## 2021-01-05 RX ORDER — FUROSEMIDE 40 MG/1
TABLET ORAL
Qty: 30 TAB | Refills: 3 | Status: SHIPPED | OUTPATIENT
Start: 2021-01-05 | End: 2021-07-13 | Stop reason: SDUPTHER

## 2021-01-13 ENCOUNTER — HOSPITAL ENCOUNTER (OUTPATIENT)
Dept: LAB | Age: 71
Discharge: HOME | End: 2021-01-13
Payer: MEDICARE

## 2021-01-13 DIAGNOSIS — D72.819 LEUKOPENIA, UNSPECIFIED TYPE: ICD-10-CM

## 2021-01-13 DIAGNOSIS — E11.8 CONTROLLED TYPE 2 DIABETES MELLITUS WITH COMPLICATION, WITHOUT LONG-TERM CURRENT USE OF INSULIN (HCC): ICD-10-CM

## 2021-01-13 LAB
BASOPHILS # BLD: 0 K/UL (ref 0–0.1)
BASOPHILS NFR BLD: 0 % (ref 0–2)
CREAT UR-MCNC: 35 MG/DL (ref 30–125)
DIFFERENTIAL METHOD BLD: ABNORMAL
EOSINOPHIL # BLD: 0.1 K/UL (ref 0–0.4)
EOSINOPHIL NFR BLD: 4 % (ref 0–5)
ERYTHROCYTE [DISTWIDTH] IN BLOOD BY AUTOMATED COUNT: 15.7 % (ref 11.6–14.5)
HCT VFR BLD AUTO: 35.9 % (ref 35–45)
HGB BLD-MCNC: 11.2 G/DL (ref 12–16)
LYMPHOCYTES # BLD: 1.4 K/UL (ref 0.9–3.6)
LYMPHOCYTES NFR BLD: 41 % (ref 21–52)
MCH RBC QN AUTO: 23 PG (ref 24–34)
MCHC RBC AUTO-ENTMCNC: 31.2 G/DL (ref 31–37)
MCV RBC AUTO: 73.9 FL (ref 74–97)
MICROALBUMIN UR-MCNC: 11.1 MG/DL (ref 0–3)
MICROALBUMIN/CREAT UR-RTO: 317 MG/G (ref 0–30)
MONOCYTES # BLD: 0.3 K/UL (ref 0.05–1.2)
MONOCYTES NFR BLD: 10 % (ref 3–10)
NEUTS SEG # BLD: 1.6 K/UL (ref 1.8–8)
NEUTS SEG NFR BLD: 45 % (ref 40–73)
PLATELET # BLD AUTO: 231 K/UL (ref 135–420)
PMV BLD AUTO: 10.8 FL (ref 9.2–11.8)
RBC # BLD AUTO: 4.86 M/UL (ref 4.2–5.3)
WBC # BLD AUTO: 3.5 K/UL (ref 4.6–13.2)

## 2021-01-13 PROCEDURE — 36415 COLL VENOUS BLD VENIPUNCTURE: CPT

## 2021-01-13 PROCEDURE — 82043 UR ALBUMIN QUANTITATIVE: CPT

## 2021-01-13 PROCEDURE — 85025 COMPLETE CBC W/AUTO DIFF WBC: CPT

## 2021-01-18 ENCOUNTER — TRANSCRIBE ORDER (OUTPATIENT)
Dept: SCHEDULING | Age: 71
End: 2021-01-18

## 2021-01-18 DIAGNOSIS — Z12.31 ENCOUNTER FOR SCREENING MAMMOGRAM FOR MALIGNANT NEOPLASM OF BREAST: Primary | ICD-10-CM

## 2021-03-05 DIAGNOSIS — I10 ESSENTIAL HYPERTENSION: ICD-10-CM

## 2021-03-05 NOTE — TELEPHONE ENCOUNTER
Received faxed refill request from [facility]    Last seen 11/23/20, labs 1/13/21, Comp/Lipid 10/6/20.  Due to return end of Feb, but pt has r/s to 4/28/21.      Requested Prescriptions     Pending Prescriptions Disp Refills   • lisinopriL (PRINIVIL, ZESTRIL) 20 mg tablet 90 Tab 0     Sig: Take 1 tablet by mouth once daily

## 2021-03-08 RX ORDER — LISINOPRIL 20 MG/1
TABLET ORAL
Qty: 90 TAB | Refills: 0 | Status: SHIPPED | OUTPATIENT
Start: 2021-03-08 | End: 2021-04-12

## 2021-04-12 DIAGNOSIS — I10 ESSENTIAL HYPERTENSION: ICD-10-CM

## 2021-04-12 RX ORDER — LISINOPRIL 20 MG/1
TABLET ORAL
Qty: 90 TAB | Refills: 0 | Status: SHIPPED | OUTPATIENT
Start: 2021-04-12 | End: 2021-04-13 | Stop reason: SDUPTHER

## 2021-04-13 DIAGNOSIS — I10 ESSENTIAL HYPERTENSION: ICD-10-CM

## 2021-04-13 RX ORDER — LISINOPRIL 20 MG/1
TABLET ORAL
Qty: 90 TAB | Refills: 0 | Status: SHIPPED | OUTPATIENT
Start: 2021-04-13 | End: 2021-07-13 | Stop reason: SDUPTHER

## 2021-04-13 NOTE — TELEPHONE ENCOUNTER
Patient need a medication refill.  Please advise     Requested Prescriptions     Pending Prescriptions Disp Refills    lisinopriL (PRINIVIL, ZESTRIL) 20 mg tablet 90 Tab 0

## 2021-04-28 ENCOUNTER — VIRTUAL VISIT (OUTPATIENT)
Dept: FAMILY MEDICINE CLINIC | Age: 71
End: 2021-04-28
Payer: MEDICARE

## 2021-04-28 DIAGNOSIS — F25.0 SCHIZOAFFECTIVE DISORDER, BIPOLAR TYPE (HCC): Primary | ICD-10-CM

## 2021-04-28 DIAGNOSIS — E11.8 CONTROLLED TYPE 2 DIABETES MELLITUS WITH COMPLICATION, WITHOUT LONG-TERM CURRENT USE OF INSULIN (HCC): ICD-10-CM

## 2021-04-28 DIAGNOSIS — I10 ESSENTIAL HYPERTENSION: ICD-10-CM

## 2021-04-28 PROCEDURE — 99442 PR PHYS/QHP TELEPHONE EVALUATION 11-20 MIN: CPT | Performed by: NURSE PRACTITIONER

## 2021-04-28 NOTE — PROGRESS NOTES
Rosi Luna is a 73 y.o. female, evaluated via audio-only technology on 4/28/2021 for Hypertension, Diabetes, and Cholesterol Problem (high chol)    3 most recent PHQ Screens 4/28/2021   Little interest or pleasure in doing things Not at all   Feeling down, depressed, irritable, or hopeless Not at all   Total Score PHQ 2 0   Patient denies any depression or schizoaffective or bipolar on this visit. She states she is fine. She denies any hallucinations or desires to hurt or harm herself or others. She is not checking her blood glucose. Denies swelling in her lower extremities. She is not checking her blood pressure. Reports she is taking her medications as prescribed. Denies any shortness of breath. Denies any chest pain. Assessment & Plan:   Diagnoses and all orders for this visit:    1. Essential hypertension  -     METABOLIC PANEL, COMPREHENSIVE; Future  -     LIPID PANEL; Future  -     CBC WITH AUTOMATED DIFF; Future  -     HEMOGLOBIN A1C WITH EAG; Future    2. Schizoaffective disorder, bipolar type [facility])  Assessment & Plan:   unclear control, continue current medications, patient declines any medication and she declines referral to specialist. She states she does not need this at this time. Orders:  -     METABOLIC PANEL, COMPREHENSIVE; Future  -     LIPID PANEL; Future  -     CBC WITH AUTOMATED DIFF; Future  -     HEMOGLOBIN A1C WITH EAG; Future    3. Controlled type 2 diabetes mellitus with complication, without long-term current use of insulin ([facility] [address]  Assessment & Plan:   unclear control, continue current medications, lifestyle modifications recommended, patient is to go for her labs. Orders:  -     METABOLIC PANEL, COMPREHENSIVE; Future  -     LIPID PANEL; Future  -     CBC WITH AUTOMATED DIFF; Future  -     HEMOGLOBIN A1C WITH EAG; Future    4. BMI 40.0-44.9, adult (HCC)  -     METABOLIC PANEL, COMPREHENSIVE; Future  -     LIPID PANEL; Future  -     CBC WITH AUTOMATED DIFF;  Future  - HEMOGLOBIN A1C WITH EAG; Future    Follow up in the office on June 22nd. [facility] virtual on May 20th  12  Subjective:       Prior to Admission medications    Medication Sig Start Date End Date Taking? Authorizing Provider   lisinopriL (PRINIVIL, ZESTRIL) 20 mg tablet Take 1 tablet by mouth once daily 4/13/21  Yes Anne Hale NP   furosemide (LASIX) 40 mg tablet TAKE 1 TABLET BY MOUTH ONCE DAILY 1/5/21  Yes Angela VALDERRAMA NP   FLOVENT DISKUS 100 mcg/actuation dsdv INHALE 1 PUFF BY MOUTH TWICE DAILY 7/15/18  Yes Cristian Chen NP   Nebulizer Accessories kit To use with nebulizer 6/6/18  Yes Cristian Chen NP   Nebulizer & Compressor machine 1 Each by Does Not Apply route daily as needed. 6/6/18  Yes Angela VALDERRAMA NP   VENTOLIN HFA 90 mcg/actuation inhaler INHALE 1-2 PUFFS EVERY 4HRS AS NEEDED FOR WHEEZE 5/6/18  Yes Provider, Historical   loratadine (CLARITIN REDITABS) 10 mg dissolvable tablet Take 1 Tab by mouth daily. 5/14/18   Angela VALDERRAMA NP   potassium chloride SR (K-TAB) 20 mEq tablet Take 1 Tab by mouth daily. 1/17/18   Angela VALDERRAMA NP   aspirin delayed-release 81 mg tablet Take  by mouth daily. Provider, Historical   acetaminophen (TYLENOL ARTHRITIS) 650 mg CR tablet Take 650 mg by mouth every six (6) hours as needed for Pain.     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    Obesity, morbid ([facility] [address] E66.01    Acute confusion R41.0    Altered mental status R41.82    Positive serology for syphilis A53.0    Schizoaffective disorder, bipolar type ([facility] [address] F25.0    Controlled type 2 diabetes mellitus with complication, without long-term current use of insulin ([facility] E11.8     Patient Active Problem List    Diagnosis Date Noted    Schizoaffective disorder, bipolar type ([facility] [address] 04/28/2021    Controlled type 2 diabetes mellitus with complication, without long-term current use of insulin ([facility] [address] 04/28/2021    Obesity, morbid ([facility] [address] 11/27/2017    Latent early syphilis 01/04/2017    Acute confusion 12/22/2016    Altered mental status 12/22/2016    Positive serology for syphilis 12/22/2016    ACP (advance care planning) 11/21/2016    Prediabetes 10/08/2014    Family history of diabetes mellitus type II, mother 10/08/2014    Vitamin D deficiency 10/08/2014    HTN (hypertension) 08/26/2014    Hyperlipidemia 08/26/2014    Family history of heart attack, premature, brother 46s 08/26/2014     Current Outpatient Medications   Medication Sig Dispense Refill    lisinopriL (PRINIVIL, ZESTRIL) 20 mg tablet Take 1 tablet by mouth once daily 90 Tab 0    furosemide (LASIX) 40 mg tablet TAKE 1 TABLET BY MOUTH ONCE DAILY 30 Tab 3    FLOVENT DISKUS 100 mcg/actuation dsdv INHALE 1 PUFF BY MOUTH TWICE DAILY 1 Each 2    Nebulizer Accessories kit To use with nebulizer 1 Kit 0    Nebulizer & Compressor machine 1 Each by Does Not Apply route daily as needed. 1 Each 0    VENTOLIN HFA 90 mcg/actuation inhaler INHALE 1-2 PUFFS EVERY 4HRS AS NEEDED FOR WHEEZE  0    loratadine (CLARITIN REDITABS) 10 mg dissolvable tablet Take 1 Tab by mouth daily. 30 Tab 0    potassium chloride SR (K-TAB) 20 mEq tablet Take 1 Tab by mouth daily. 30 Tab 3    aspirin delayed-release 81 mg tablet Take  by mouth daily.  acetaminophen (TYLENOL ARTHRITIS) 650 mg CR tablet Take 650 mg by mouth every six (6) hours as needed for Pain.        No Known Allergies  Past Medical History:   Diagnosis Date    Asthma     Hypertension     Latent syphilis 3/22/2017    Osteoarthritis of ankle     Right ankle pain 2008    posterior tibial tendon interstitial tear    Right foot pain 2011    hindfoot arthrosis    Sleep disorder      Past Surgical History:   Procedure Laterality Date    HX  SECTION       Family History   Problem Relation Age of Onset    Diabetes Mother     Hypertension Mother     Heart Disease Father     Hypertension Father     Gout Father     Heart Attack Brother     Schizophrenia Brother      Social History     Tobacco Use    Smoking status: Never Smoker    Smokeless tobacco: Never Used   Substance Use Topics    Alcohol use: No       Review of Systems   Constitutional: Negative for fever. HENT: Negative for congestion. Eyes: Negative for blurred vision. Respiratory: Negative for cough and shortness of breath. Cardiovascular: Negative for chest pain and leg swelling. Gastrointestinal: Negative for abdominal pain, nausea and vomiting. Neurological: Negative for dizziness. Psychiatric/Behavioral: Negative for depression, hallucinations, substance abuse and suicidal ideas. No flowsheet data found. Leela Gómez, who was evaluated through a patient-initiated, synchronous (real-time) audio only encounter, and/or her healthcare decision maker, is aware that it is a billable service, with coverage as determined by her insurance carrier. She provided verbal consent to proceed: Yes. She has not had a related appointment within my department in the past 7 days or scheduled within the next 24 hours.     Total time 11 minutes  Total Time: minutes: 11-20 minutes    Aurea Ohara NP

## 2021-04-28 NOTE — PROGRESS NOTES
Leydi Martinez presents today for   Chief Complaint   Patient presents with    Hypertension    Diabetes    Cholesterol Problem     high chol       Is someone accompanying this pt? no    Is the patient using any DME equipment during OV? no    Depression Screening:  3 most recent PHQ Screens 4/28/2021   Little interest or pleasure in doing things Not at all   Feeling down, depressed, irritable, or hopeless Not at all   Total Score PHQ 2 0       Learning Assessment:  Learning Assessment 4/28/2021   PRIMARY LEARNER Patient   HIGHEST LEVEL OF EDUCATION - PRIMARY LEARNER  SOME COLLEGE   BARRIERS PRIMARY LEARNER NONE   CO-LEARNER CAREGIVER No   PRIMARY LANGUAGE ENGLISH    NEED No   LEARNER PREFERENCE PRIMARY DEMONSTRATION     -     -   ANSWERED BY patient   RELATIONSHIP SELF       Abuse Screening:  Abuse Screening Questionnaire 4/28/2021   Do you ever feel afraid of your partner? N   Are you in a relationship with someone who physically or mentally threatens you? N   Is it safe for you to go home? Y       Fall Screening  Fall Risk Assessment, last 12 mths 4/28/2021   Able to walk? Yes   Fall in past 12 months? 0   Do you feel unsteady? 0   Are you worried about falling 0       Generalized Anxiety  No flowsheet data found. Health Maintenance Due   Topic Date Due    Foot Exam Q1  Never done    Eye Exam Retinal or Dilated  Never done    COVID-19 Vaccine (1) Never done    DTaP/Tdap/Td series (1 - Tdap) Never done    Shingrix Vaccine Age 50> (1 of 2) Never done    Bone Densitometry (Dexa) Screening  Never done    Medicare Yearly Exam  01/09/2021   . Health Maintenance reviewed and discussed and ordered per Provider. Coordination of Care  1. Have you been to the ER, urgent care clinic since your last visit? Hospitalized since your last visit? no    2. Have you seen or consulted any other health care providers outside of the [address] [place] [address] since your last visit?   Include any pap smears or colon screening. no      Advance Directive:  1. Do you have an advance directive in place?  Patient Reply:no

## 2021-04-28 NOTE — ASSESSMENT & PLAN NOTE
unclear control, continue current medications, lifestyle modifications recommended, patient is to go for her labs.

## 2021-04-28 NOTE — ASSESSMENT & PLAN NOTE
unclear control, continue current medications, patient declines any medication and she declines referral to specialist. She states she does not need this at this time.

## 2021-04-30 ENCOUNTER — HOSPITAL ENCOUNTER (OUTPATIENT)
Dept: MAMMOGRAPHY | Age: 71
Discharge: HOME | End: 2021-04-30
Attending: NURSE PRACTITIONER
Payer: MEDICARE

## 2021-04-30 DIAGNOSIS — Z12.31 ENCOUNTER FOR SCREENING MAMMOGRAM FOR MALIGNANT NEOPLASM OF BREAST: ICD-10-CM

## 2021-04-30 PROCEDURE — 77067 SCR MAMMO BI INCL CAD: CPT

## 2021-07-13 ENCOUNTER — OFFICE VISIT (OUTPATIENT)
Dept: FAMILY MEDICINE CLINIC | Age: 71
End: 2021-07-13
Payer: MEDICARE

## 2021-07-13 VITALS
OXYGEN SATURATION: 97 % | SYSTOLIC BLOOD PRESSURE: 133 MMHG | HEART RATE: 73 BPM | TEMPERATURE: 97.3 F | HEIGHT: 60 IN | BODY MASS INDEX: 39.03 KG/M2 | WEIGHT: 198.8 LBS | RESPIRATION RATE: 20 BRPM | DIASTOLIC BLOOD PRESSURE: 81 MMHG

## 2021-07-13 DIAGNOSIS — L75.0 BODY ODOR: ICD-10-CM

## 2021-07-13 DIAGNOSIS — F25.0 SCHIZOAFFECTIVE DISORDER, BIPOLAR TYPE (HCC): ICD-10-CM

## 2021-07-13 DIAGNOSIS — I10 ESSENTIAL HYPERTENSION: ICD-10-CM

## 2021-07-13 DIAGNOSIS — E11.8 CONTROLLED TYPE 2 DIABETES MELLITUS WITH COMPLICATION, WITHOUT LONG-TERM CURRENT USE OF INSULIN (HCC): ICD-10-CM

## 2021-07-13 DIAGNOSIS — R60.0 BILATERAL LEG EDEMA: Primary | ICD-10-CM

## 2021-07-13 PROCEDURE — 1090F PRES/ABSN URINE INCON ASSESS: CPT | Performed by: NURSE PRACTITIONER

## 2021-07-13 PROCEDURE — 3046F HEMOGLOBIN A1C LEVEL >9.0%: CPT | Performed by: NURSE PRACTITIONER

## 2021-07-13 PROCEDURE — 99214 OFFICE O/P EST MOD 30 MIN: CPT | Performed by: NURSE PRACTITIONER

## 2021-07-13 PROCEDURE — 2022F DILAT RTA XM EVC RTNOPTHY: CPT | Performed by: NURSE PRACTITIONER

## 2021-07-13 PROCEDURE — G8752 SYS BP LESS 140: HCPCS | Performed by: NURSE PRACTITIONER

## 2021-07-13 PROCEDURE — G8417 CALC BMI ABV UP PARAM F/U: HCPCS | Performed by: NURSE PRACTITIONER

## 2021-07-13 PROCEDURE — G9899 SCRN MAM PERF RSLTS DOC: HCPCS | Performed by: NURSE PRACTITIONER

## 2021-07-13 PROCEDURE — G8400 PT W/DXA NO RESULTS DOC: HCPCS | Performed by: NURSE PRACTITIONER

## 2021-07-13 PROCEDURE — 3017F COLORECTAL CA SCREEN DOC REV: CPT | Performed by: NURSE PRACTITIONER

## 2021-07-13 PROCEDURE — G8754 DIAS BP LESS 90: HCPCS | Performed by: NURSE PRACTITIONER

## 2021-07-13 PROCEDURE — 1101F PT FALLS ASSESS-DOCD LE1/YR: CPT | Performed by: NURSE PRACTITIONER

## 2021-07-13 PROCEDURE — G9717 DOC PT DX DEP/BP F/U NT REQ: HCPCS | Performed by: NURSE PRACTITIONER

## 2021-07-13 PROCEDURE — G8536 NO DOC ELDER MAL SCRN: HCPCS | Performed by: NURSE PRACTITIONER

## 2021-07-13 PROCEDURE — G8427 DOCREV CUR MEDS BY ELIG CLIN: HCPCS | Performed by: NURSE PRACTITIONER

## 2021-07-13 RX ORDER — FUROSEMIDE 40 MG/1
TABLET ORAL
Qty: 90 TABLET | Refills: 0 | Status: SHIPPED | OUTPATIENT
Start: 2021-07-13 | End: 2022-05-04 | Stop reason: SDUPTHER

## 2021-07-13 RX ORDER — LISINOPRIL 20 MG/1
TABLET ORAL
Qty: 90 TABLET | Refills: 0 | Status: SHIPPED | OUTPATIENT
Start: 2021-07-13 | End: 2021-10-04

## 2021-07-13 NOTE — PROGRESS NOTES
ROYAL Anton is a 73 y.o. female  Chief Complaint   Patient presents with    Diabetes    Hypertension    Cholesterol Problem     high chol     3 most recent PHQ Screens 2021   Little interest or pleasure in doing things Not at all   Feeling down, depressed, irritable, or hopeless Not at all   Total Score PHQ 2 0     Denies any desire to hurt or harm herself or others. She is not seeing anyone from the [facility] board. She does not thinks she needs any medications for her mental health. She denies any hallucinations. She denies any chest pains or shortness of breath. She reports she has not had to use her inhalers in a long time. She does have some swelling in her lower extremities. She is out of her lasix. She is walking for weight loss. She is not taking anything for diabetes. Past Medical History  Past Medical History:   Diagnosis Date    Asthma     Hypertension     Latent syphilis 3/22/2017    Osteoarthritis of ankle     Right ankle pain     posterior tibial tendon interstitial tear    Right foot pain     hindfoot arthrosis    Sleep disorder        Surgical History  Past Surgical History:   Procedure Laterality Date    HX  SECTION          Medications  Current Outpatient Medications   Medication Sig Dispense Refill    furosemide (LASIX) 40 mg tablet TAKE 1 TABLET BY MOUTH ONCE DAILY 90 Tablet 0    lisinopriL (PRINIVIL, ZESTRIL) 20 mg tablet Take 1 tablet by mouth once daily 90 Tablet 0    FLOVENT DISKUS 100 mcg/actuation dsdv INHALE 1 PUFF BY MOUTH TWICE DAILY 1 Each 2    VENTOLIN HFA 90 mcg/actuation inhaler INHALE 1-2 PUFFS EVERY 4HRS AS NEEDED FOR WHEEZE  0    Nebulizer Accessories kit To use with nebulizer 1 Kit 0    Nebulizer & Compressor machine 1 Each by Does Not Apply route daily as needed. 1 Each 0    potassium chloride SR (K-TAB) 20 mEq tablet Take 1 Tab by mouth daily.  30 Tab 3       Allergies  No Known Allergies    Family History  Family History   Problem Relation Age of Onset    Diabetes Mother     Hypertension Mother     Heart Disease Father     Hypertension Father     Gout Father     Heart Attack Brother     Schizophrenia Brother        Social History  Social History     Socioeconomic History    Marital status:      Spouse name: Not on file    Number of children: Not on file    Years of education: Not on file    Highest education level: Not on file   Occupational History    Not on file   Tobacco Use    Smoking status: Never Smoker    Smokeless tobacco: Never Used   Substance and Sexual Activity    Alcohol use: No    Drug use: No    Sexual activity: Never   Other Topics Concern    Not on file   Social History Narrative    Not on file     Social Determinants of Health     Financial Resource Strain:     Difficulty of Paying Living Expenses:    Food Insecurity:     Worried About Running Out of Food in the Last Year:     Ran Out of Food in the Last Year:    Transportation Needs:     Lack of Transportation (Medical):      Lack of Transportation (Non-Medical):    Physical Activity:     Days of Exercise per Week:     Minutes of Exercise per Session:    Stress:     Feeling of Stress :    Social Connections:     Frequency of Communication with Friends and Family:     Frequency of Social Gatherings with Friends and Family:     Attends Mandaeism Services:     Active Member of Clubs or Organizations:     Attends Club or Organization Meetings:     Marital Status:    Intimate Partner Violence:     Fear of Current or Ex-Partner:     Emotionally Abused:     Physically Abused:     Sexually Abused:        Problem List  Patient Active Problem List   Diagnosis Code    HTN (hypertension) I10    Hyperlipidemia E78.5    Family history of heart attack, premature, brother 46s Z80.55    Prediabetes R73.03    Family history of diabetes mellitus type II, mother Z80.1    Vitamin D deficiency E55.9    ACP (advance care planning) Z71.89    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 E66.01    Acute confusion R41.0    Altered mental status R41.82    Positive serology for syphilis A53.0    Schizoaffective disorder, bipolar type ([facility] F25.0    Controlled type 2 diabetes mellitus with complication, without long-term current use of insulin (HCC) E11.8       Review of Systems  Review of Systems   Constitutional: Negative for fever. Respiratory: Negative for shortness of breath. Cardiovascular: Positive for leg swelling. Negative for chest pain. Gastrointestinal: Negative for nausea and vomiting. Genitourinary: Negative. Neurological: Negative for dizziness. Psychiatric/Behavioral: Negative for depression, hallucinations, substance abuse and suicidal ideas.        Vital Signs  Vitals:    07/13/21 1339 07/13/21 1415   BP: (!) 143/93 133/81   Pulse: 73    Resp: 20    Temp: 97.3 °F (36.3 °C)    TempSrc: Temporal    SpO2: 97%    Weight: 198 lb 12.8 oz (90.2 kg)    Height: 5' (1.524 m)    PainSc:   0 - No pain        Physical Exam  Physical Exam    Diagnostics  Orders Placed This Encounter    furosemide (LASIX) 40 mg tablet     Sig: TAKE 1 TABLET BY MOUTH ONCE DAILY     Dispense:  90 Tablet     Refill:  0     Please consider 90 day supplies to promote better adherence    lisinopriL (PRINIVIL, ZESTRIL) 20 mg tablet     Sig: Take 1 tablet by mouth once daily     Dispense:  90 Tablet     Refill:  0       Results  Results for orders placed or performed during the hospital encounter of 01/13/21   CBC WITH AUTOMATED DIFF   Result Value Ref Range    WBC 3.5 (L) 4.6 - 13.2 K/uL    RBC 4.86 4.20 - 5.30 M/uL    HGB 11.2 (L) 12.0 - 16.0 g/dL    HCT 35.9 35.0 - 45.0 %    MCV 73.9 (L) 74.0 - 97.0 FL    MCH 23.0 (L) 24.0 - 34.0 PG    MCHC 31.2 31.0 - 37.0 g/dL    RDW 15.7 (H) 11.6 - 14.5 %    PLATELET 933 908 - 826 K/uL    MPV 10.8 9.2 - 11.8 FL    NEUTROPHILS 45 40 - 73 %    LYMPHOCYTES 41 21 - 52 %    MONOCYTES 10 3 - 10 % EOSINOPHILS 4 0 - 5 %    BASOPHILS 0 0 - 2 %    ABS. NEUTROPHILS 1.6 (L) 1.8 - 8.0 K/UL    ABS. LYMPHOCYTES 1.4 0.9 - 3.6 K/UL    ABS. MONOCYTES 0.3 0.05 - 1.2 K/UL    ABS. EOSINOPHILS 0.1 0.0 - 0.4 K/UL    ABS. BASOPHILS 0.0 0.0 - 0.1 K/UL    DF AUTOMATED     MICROALBUMIN, UR, RAND W/ MICROALB/CREAT RATIO   Result Value Ref Range    Microalbumin,urine random 11.10 (H) 0 - 3.0 MG/DL    Creatinine, urine 35.00 30 - 125 mg/dL    Microalbumin/Creat ratio (mg/g creat) 317 (H) 0 - 30 mg/g       Assessment and Plan  Diagnoses and all orders for this visit:    1. Bilateral leg edema  -     furosemide (LASIX) 40 mg tablet; TAKE 1 TABLET BY MOUTH ONCE DAILY    2. Essential hypertension  -     lisinopriL (PRINIVIL, ZESTRIL) 20 mg tablet; Take 1 tablet by mouth once daily    3. Body odor    4. Schizoaffective disorder, bipolar type ([address]    5. Controlled type 2 diabetes mellitus with complication, without long-term current use of insulin ([address]    6. BMI 38.0-38.9,adult      Go for Labs. Encouraged good hygiene. Continue to walk for exercise. After care summary printed and reviewed with patient. Plan reviewed with patient. Questions answered. Patient verbalized understanding of plan and is in agreement with plan. Patient to follow up in one month or earlier if symptoms worsen.      TONY CheneyP-C

## 2021-07-13 NOTE — PROGRESS NOTES
Jair Bautista presents today for   Chief Complaint   Patient presents with    Diabetes    Hypertension    Cholesterol Problem     high chol       Is someone accompanying this pt? no    Is the patient using any DME equipment during OV? no    Depression Screening:  3 most recent PHQ Screens 7/13/2021   Little interest or pleasure in doing things Not at all   Feeling down, depressed, irritable, or hopeless Not at all   Total Score PHQ 2 0       Learning Assessment:  Learning Assessment 4/28/2021   PRIMARY LEARNER Patient   HIGHEST LEVEL OF EDUCATION - PRIMARY LEARNER  SOME COLLEGE   BARRIERS PRIMARY LEARNER NONE   CO-LEARNER CAREGIVER No   PRIMARY LANGUAGE ENGLISH    NEED No   LEARNER PREFERENCE PRIMARY DEMONSTRATION     -     -   ANSWERED BY patient   RELATIONSHIP SELF       Abuse Screening:  Abuse Screening Questionnaire 4/28/2021   Do you ever feel afraid of your partner? N   Are you in a relationship with someone who physically or mentally threatens you? N   Is it safe for you to go home? Y       Fall Screening  Fall Risk Assessment, last 12 mths 4/28/2021   Able to walk? Yes   Fall in past 12 months? 0   Do you feel unsteady? 0   Are you worried about falling 0       Generalized Anxiety  No flowsheet data found. Health Maintenance Due   Topic Date Due    Foot Exam Q1  Never done    Eye Exam Retinal or Dilated  Never done    COVID-19 Vaccine (1) Never done    DTaP/Tdap/Td series (1 - Tdap) Never done    Shingrix Vaccine Age 50> (1 of 2) Never done    Bone Densitometry (Dexa) Screening  Never done    Medicare Yearly Exam  01/09/2021   . Health Maintenance reviewed and discussed and ordered per Provider. Coordination of Care  1. Have you been to the ER, urgent care clinic since your last visit? Hospitalized since your last visit? Yes, [facility]    2. Have you seen or consulted any other health care providers outside of the [address] [place] [address] since your last visit?   Include any pap smears or colon screening.  no      Advance Directive: discussed 4/28/21

## 2021-07-29 ENCOUNTER — HOSPITAL ENCOUNTER (OUTPATIENT)
Dept: LAB | Age: 71
Discharge: HOME | End: 2021-07-29
Payer: MEDICARE

## 2021-07-29 DIAGNOSIS — E11.8 CONTROLLED TYPE 2 DIABETES MELLITUS WITH COMPLICATION, WITHOUT LONG-TERM CURRENT USE OF INSULIN (HCC): ICD-10-CM

## 2021-07-29 DIAGNOSIS — I10 ESSENTIAL HYPERTENSION: ICD-10-CM

## 2021-07-29 DIAGNOSIS — F25.0 SCHIZOAFFECTIVE DISORDER, BIPOLAR TYPE (HCC): ICD-10-CM

## 2021-07-29 LAB
ALBUMIN SERPL-MCNC: 3.6 G/DL (ref 3.4–5)
ALBUMIN/GLOB SERPL: 0.9 {RATIO} (ref 0.8–1.7)
ALP SERPL-CCNC: 101 U/L (ref 45–117)
ALT SERPL-CCNC: 17 U/L (ref 13–56)
ANION GAP SERPL CALC-SCNC: 5 MMOL/L (ref 3–18)
AST SERPL-CCNC: 17 U/L (ref 10–38)
BASOPHILS # BLD: 0 K/UL (ref 0–0.1)
BASOPHILS NFR BLD: 1 % (ref 0–2)
BILIRUB SERPL-MCNC: 0.5 MG/DL (ref 0.2–1)
BUN SERPL-MCNC: 18 MG/DL (ref 7–18)
BUN/CREAT SERPL: 19 (ref 12–20)
CALCIUM SERPL-MCNC: 9.4 MG/DL (ref 8.5–10.1)
CHLORIDE SERPL-SCNC: 110 MMOL/L (ref 100–111)
CHOLEST SERPL-MCNC: 252 MG/DL
CO2 SERPL-SCNC: 27 MMOL/L (ref 21–32)
CREAT SERPL-MCNC: 0.96 MG/DL (ref 0.6–1.3)
DIFFERENTIAL METHOD BLD: ABNORMAL
EOSINOPHIL # BLD: 0.1 K/UL (ref 0–0.4)
EOSINOPHIL NFR BLD: 2 % (ref 0–5)
ERYTHROCYTE [DISTWIDTH] IN BLOOD BY AUTOMATED COUNT: 16.4 % (ref 11.6–14.5)
EST. AVERAGE GLUCOSE BLD GHB EST-MCNC: 131 MG/DL
GLOBULIN SER CALC-MCNC: 4.1 G/DL (ref 2–4)
GLUCOSE SERPL-MCNC: 91 MG/DL (ref 74–99)
HBA1C MFR BLD: 6.2 % (ref 4.2–5.6)
HCT VFR BLD AUTO: 38.6 % (ref 35–45)
HDLC SERPL-MCNC: 85 MG/DL (ref 40–60)
HDLC SERPL: 3 {RATIO} (ref 0–5)
HGB BLD-MCNC: 11.4 G/DL (ref 12–16)
LDLC SERPL CALC-MCNC: 154.6 MG/DL (ref 0–100)
LIPID PROFILE,FLP: ABNORMAL
LYMPHOCYTES # BLD: 1.1 K/UL (ref 0.9–3.6)
LYMPHOCYTES NFR BLD: 26 % (ref 21–52)
MCH RBC QN AUTO: 22 PG (ref 24–34)
MCHC RBC AUTO-ENTMCNC: 29.5 G/DL (ref 31–37)
MCV RBC AUTO: 74.5 FL (ref 74–97)
MONOCYTES # BLD: 0.4 K/UL (ref 0.05–1.2)
MONOCYTES NFR BLD: 10 % (ref 3–10)
NEUTS SEG # BLD: 2.5 K/UL (ref 1.8–8)
NEUTS SEG NFR BLD: 61 % (ref 40–73)
PLATELET # BLD AUTO: 205 K/UL (ref 135–420)
PMV BLD AUTO: 11.1 FL (ref 9.2–11.8)
POTASSIUM SERPL-SCNC: 3.7 MMOL/L (ref 3.5–5.5)
PROT SERPL-MCNC: 7.7 G/DL (ref 6.4–8.2)
RBC # BLD AUTO: 5.18 M/UL (ref 4.2–5.3)
SODIUM SERPL-SCNC: 142 MMOL/L (ref 136–145)
TRIGL SERPL-MCNC: 62 MG/DL (ref ?–150)
VLDLC SERPL CALC-MCNC: 12.4 MG/DL
WBC # BLD AUTO: 4.2 K/UL (ref 4.6–13.2)

## 2021-07-29 PROCEDURE — 80061 LIPID PANEL: CPT

## 2021-07-29 PROCEDURE — 85025 COMPLETE CBC W/AUTO DIFF WBC: CPT

## 2021-07-29 PROCEDURE — 36415 COLL VENOUS BLD VENIPUNCTURE: CPT

## 2021-07-29 PROCEDURE — 83036 HEMOGLOBIN GLYCOSYLATED A1C: CPT

## 2021-07-29 PROCEDURE — 80053 COMPREHEN METABOLIC PANEL: CPT

## 2021-08-11 ENCOUNTER — OFFICE VISIT (OUTPATIENT)
Dept: FAMILY MEDICINE CLINIC | Age: 71
End: 2021-08-11
Payer: MEDICARE

## 2021-08-11 VITALS
OXYGEN SATURATION: 96 % | HEART RATE: 56 BPM | WEIGHT: 187.8 LBS | HEIGHT: 60 IN | SYSTOLIC BLOOD PRESSURE: 124 MMHG | TEMPERATURE: 97.2 F | DIASTOLIC BLOOD PRESSURE: 79 MMHG | RESPIRATION RATE: 20 BRPM | BODY MASS INDEX: 36.87 KG/M2

## 2021-08-11 DIAGNOSIS — Z71.2 ENCOUNTER TO DISCUSS TEST RESULTS: ICD-10-CM

## 2021-08-11 DIAGNOSIS — I10 ESSENTIAL HYPERTENSION: ICD-10-CM

## 2021-08-11 DIAGNOSIS — E78.2 MIXED HYPERLIPIDEMIA: Primary | ICD-10-CM

## 2021-08-11 DIAGNOSIS — E11.8 CONTROLLED TYPE 2 DIABETES MELLITUS WITH COMPLICATION, WITHOUT LONG-TERM CURRENT USE OF INSULIN (HCC): ICD-10-CM

## 2021-08-11 DIAGNOSIS — L75.0 BODY ODOR: ICD-10-CM

## 2021-08-11 DIAGNOSIS — R60.0 BILATERAL LEG EDEMA: ICD-10-CM

## 2021-08-11 PROCEDURE — G9717 DOC PT DX DEP/BP F/U NT REQ: HCPCS | Performed by: NURSE PRACTITIONER

## 2021-08-11 PROCEDURE — 99214 OFFICE O/P EST MOD 30 MIN: CPT | Performed by: NURSE PRACTITIONER

## 2021-08-11 PROCEDURE — G8400 PT W/DXA NO RESULTS DOC: HCPCS | Performed by: NURSE PRACTITIONER

## 2021-08-11 PROCEDURE — 1090F PRES/ABSN URINE INCON ASSESS: CPT | Performed by: NURSE PRACTITIONER

## 2021-08-11 PROCEDURE — 3044F HG A1C LEVEL LT 7.0%: CPT | Performed by: NURSE PRACTITIONER

## 2021-08-11 PROCEDURE — G8754 DIAS BP LESS 90: HCPCS | Performed by: NURSE PRACTITIONER

## 2021-08-11 PROCEDURE — G8427 DOCREV CUR MEDS BY ELIG CLIN: HCPCS | Performed by: NURSE PRACTITIONER

## 2021-08-11 PROCEDURE — 1101F PT FALLS ASSESS-DOCD LE1/YR: CPT | Performed by: NURSE PRACTITIONER

## 2021-08-11 PROCEDURE — G9899 SCRN MAM PERF RSLTS DOC: HCPCS | Performed by: NURSE PRACTITIONER

## 2021-08-11 PROCEDURE — 2022F DILAT RTA XM EVC RTNOPTHY: CPT | Performed by: NURSE PRACTITIONER

## 2021-08-11 PROCEDURE — G8417 CALC BMI ABV UP PARAM F/U: HCPCS | Performed by: NURSE PRACTITIONER

## 2021-08-11 PROCEDURE — G8536 NO DOC ELDER MAL SCRN: HCPCS | Performed by: NURSE PRACTITIONER

## 2021-08-11 PROCEDURE — 3017F COLORECTAL CA SCREEN DOC REV: CPT | Performed by: NURSE PRACTITIONER

## 2021-08-11 PROCEDURE — G8752 SYS BP LESS 140: HCPCS | Performed by: NURSE PRACTITIONER

## 2021-08-11 RX ORDER — PRAVASTATIN SODIUM 10 MG/1
10 TABLET ORAL
Qty: 30 TABLET | Refills: 1 | Status: SHIPPED | OUTPATIENT
Start: 2021-08-11 | End: 2021-09-24 | Stop reason: SDUPTHER

## 2021-08-11 NOTE — PATIENT INSTRUCTIONS
High Cholesterol: Care Instructions  Your Care Instructions     Cholesterol is a type of fat in your blood. It is needed for many body functions, such as making new cells. Cholesterol is made by your body. It also comes from food you eat. High cholesterol means that you have too much of the fat in your blood. This raises your risk of a heart attack and stroke. LDL and HDL are part of your total cholesterol. LDL is the \"bad\" cholesterol. High LDL can raise your risk for heart disease, heart attack, and stroke. HDL is the \"good\" cholesterol. It helps clear bad cholesterol from the body. High HDL is linked with a lower risk of heart disease, heart attack, and stroke. Your cholesterol levels help your doctor find out your risk for having a heart attack or stroke. You and your doctor can talk about whether you need to lower your risk and what treatment is best for you. A heart-healthy lifestyle along with medicines can help lower your cholesterol and your risk. The way you choose to lower your risk will depend on how high your risk is for heart attack and stroke. It will also depend on how you feel about taking medicines. Follow-up care is a key part of your treatment and safety. Be sure to make and go to all appointments, and call your doctor if you are having problems. It's also a good idea to know your test results and keep a list of the medicines you take. How can you care for yourself at home? · Eat a variety of foods every day. Good choices include fruits, vegetables, whole grains (like oatmeal), dried beans and peas, nuts and seeds, soy products (like tofu), and fat-free or low-fat dairy products. · Replace butter, margarine, and hydrogenated or partially hydrogenated oils with olive and canola oils. (Canola oil margarine without trans fat is fine.)  · Replace red meat with fish, poultry, and soy protein (like tofu). · Limit processed and packaged foods like chips, crackers, and cookies.   · Bake, broil, or steam foods. Don't franklin them. · Be physically active. Get at least 30 minutes of exercise on most days of the week. Walking is a good choice. You also may want to do other activities, such as running, swimming, cycling, or playing tennis or team sports. · Stay at a healthy weight or lose weight by making the changes in eating and physical activity listed above. Losing just a small amount of weight, even 5 to 10 pounds, can reduce your risk for having a heart attack or stroke. · Do not smoke. When should you call for help? Watch closely for changes in your health, and be sure to contact your doctor if:    · You need help making lifestyle changes.     · You have questions about your medicine. Where can you learn more? Go to http://www.gray.com/  Enter U609 in the search box to learn more about \"High Cholesterol: Care Instructions. \"  Current as of: August 31, 2020               Content Version: 12.8  © 2006-2021 [employer]. Care instructions adapted under license by [employer] (which disclaims liability or warranty for this information). If you have questions about a medical condition or this instruction, always ask your healthcare professional. [address] any warranty or liability for your use of this information. Learning About High Cholesterol  What is high cholesterol? High cholesterol means that you have too much cholesterol in your blood. Cholesterol is a type of fat. It's needed for many body functions, such as making new cells. Cholesterol is made by your body. It also comes from food you eat. Having high cholesterol can lead to the buildup of plaque in artery walls. This can increase your risk of heart disease and stroke. When your doctor talks about high cholesterol levels, he or she is talking about your total cholesterol and LDL cholesterol (the \"bad\" cholesterol) levels.  Your doctor may also speak about HDL (the \"good\" cholesterol) levels. High HDL is linked with a lower risk for heart disease, heart attack, and stroke. Your cholesterol levels help your doctor find out your risk for having a heart attack or stroke. How can you prevent high cholesterol? A heart-healthy lifestyle can help you prevent high cholesterol. This lifestyle helps lower your risk for a heart attack and stroke. · Eat heart-healthy foods. ? Eat fruits, vegetables, whole grains (like oatmeal), dried beans and peas, nuts and seeds, soy products (like tofu), and fat-free or low-fat dairy products. ? Replace butter, margarine, and hydrogenated or partially hydrogenated oils with olive and canola oils. (Canola oil margarine without trans fat is fine.)  ? Replace red meat with fish, poultry, and soy protein (like tofu). ? Limit processed and packaged foods like chips, crackers, and cookies. · Be active. Exercise can improve your cholesterol level. Get at least 30 minutes of exercise on most days of the week. Walking is a good choice. You also may want to do other activities, such as running, swimming, cycling, or playing tennis or team sports. · Stay at a healthy weight. Lose weight if you need to. · Don't smoke. If you need help quitting, talk to your doctor about stop-smoking programs and medicines. These can increase your chances of quitting for good. How is high cholesterol treated? The goal of treatment is to reduce your chances of having a heart attack or stroke. The goal is not to lower your cholesterol numbers only. · You may make lifestyle changes, such as eating healthy foods, not smoking, losing weight, and being more active. · You may have to take medicine. Follow-up care is a key part of your treatment and safety. Be sure to make and go to all appointments, and call your doctor if you are having problems. It's also a good idea to know your test results and keep a list of the medicines you take.   Where can you learn more?  Go to http://www.gray.com/  Enter Q621 in the search box to learn more about \"Learning About High Cholesterol. \"  Current as of: August 31, 2020               Content Version: 12.8  © 2006-2021 [employer]. Care instructions adapted under license by [employer] (which disclaims liability or warranty for this information). If you have questions about a medical condition or this instruction, always ask your healthcare professional. [address] any warranty or liability for your use of this information. Learning About Statins for People With Diabetes  Introduction  Statins are medicines that help with your cholesterol. Cholesterol is a type of fat in your blood. If you have too much of this fat, it can build up in blood vessels. This raises your risk of heart disease, heart attack, and stroke. Many people with diabetes take statins. Diabetes can cause problems in your body that may also lead to heart disease. That means your risks of heart attack and stroke are higher when you have diabetes. Statins can lower your risk. Your doctor may prescribe a statin if:  · You have diabetes. · AND you are age 36 to 76. Statins may help people with diabetes at other ages too. Your doctor can help you decide if statins may help you. What are some examples of statins? Here are some examples of statins. For each item in the list, the generic name is first, followed by any brand names. · atorvastatin (Lipitor)  · lovastatin (Altoprev, Mevacor)  · pravastatin (Pravachol)  · rosuvastatin (Crestor)  · simvastatin (Zocor)  This is not a complete list of statins. Possible side effects  Some people who take statins report that they have more muscle aches. But it's not clear whether these are actually a side effect of statins. Most side effects will go away if you stop taking the medicine. You may have other side effects not described here.  Check the information that comes with your medicine. What to know about taking this medicine  · You must take statins on a regular basis for them to work well. If you stop, your risk for heart attack and stroke may go back up. · Be safe with medicines. Take your medicines exactly as prescribed. Call your doctor if you think you are having a problem with your medicine. · If you have side effects that bother you, talk to your doctor. You may be able to take a different statin. · Check with your doctor or pharmacist before you use any other medicines. This includes over-the-counter medicines. Make sure your doctor knows all of the medicines, vitamins, herbal products, and supplements you take. Taking some medicines together can cause problems. Where can you learn more? Go to http://www.gray.com/  Enter O382 in the search box to learn more about \"Learning About Statins for People With Diabetes. \"  Current as of: August 31, 2020               Content Version: 12.8  © 2006-2021 [employer], [facility]. Care instructions adapted under license by [employer] (which disclaims liability or warranty for this information). If you have questions about a medical condition or this instruction, always ask your healthcare professional. [address] any warranty or liability for your use of this information.

## 2021-08-11 NOTE — PROGRESS NOTES
ROYAL Bautista is a 73 y.o. female  Chief Complaint   Patient presents with    Follow-up     1 month    Hypertension    Cholesterol Problem     high chol    Results     discuss lab results     She reports she is walking a lot. She is trying to loose weight. Eye exam on . She has been for her labs. She does have some swelling in her legs. She has not see vascular. Denies any chest pain or shortness of breath  She reports she has had the COVID vaccine. Reports she is well. Past Medical History  Past Medical History:   Diagnosis Date    Asthma     Hypertension     Latent syphilis 3/22/2017    Osteoarthritis of ankle     Right ankle pain     posterior tibial tendon interstitial tear    Right foot pain     hindfoot arthrosis    Sleep disorder        Surgical History  Past Surgical History:   Procedure Laterality Date    HX  SECTION          Medications  Current Outpatient Medications   Medication Sig Dispense Refill    pravastatin (PRAVACHOL) 10 mg tablet Take 1 Tablet by mouth nightly. 30 Tablet 1    furosemide (LASIX) 40 mg tablet TAKE 1 TABLET BY MOUTH ONCE DAILY 90 Tablet 0    lisinopriL (PRINIVIL, ZESTRIL) 20 mg tablet Take 1 tablet by mouth once daily 90 Tablet 0    FLOVENT DISKUS 100 mcg/actuation dsdv INHALE 1 PUFF BY MOUTH TWICE DAILY 1 Each 2    VENTOLIN HFA 90 mcg/actuation inhaler INHALE 1-2 PUFFS EVERY 4HRS AS NEEDED FOR WHEEZE  0    Nebulizer Accessories kit To use with nebulizer 1 Kit 0    Nebulizer & Compressor machine 1 Each by Does Not Apply route daily as needed. 1 Each 0    potassium chloride SR (K-TAB) 20 mEq tablet Take 1 Tab by mouth daily.  30 Tab 3       Allergies  No Known Allergies    Family History  Family History   Problem Relation Age of Onset    Diabetes Mother     Hypertension Mother     Heart Disease Father     Hypertension Father     Gout Father     Heart Attack Brother     Schizophrenia Brother        Social History  Social History     Socioeconomic History    Marital status:      Spouse name: Not on file    Number of children: Not on file    Years of education: Not on file    Highest education level: Not on file   Occupational History    Not on file   Tobacco Use    Smoking status: Never Smoker    Smokeless tobacco: Never Used   Substance and Sexual Activity    Alcohol use: No    Drug use: No    Sexual activity: Never   Other Topics Concern    Not on file   Social History Narrative    Not on file     Social Determinants of Health     Financial Resource Strain:     Difficulty of Paying Living Expenses:    Food Insecurity:     Worried About Running Out of Food in the Last Year:     [address] in the Last Year:    Transportation Needs:     Lack of Transportation (Medical):      Lack of Transportation (Non-Medical):    Physical Activity:     Days of Exercise per Week:     Minutes of Exercise per Session:    Stress:     Feeling of Stress :    Social Connections:     Frequency of Communication with Friends and Family:     Frequency of Social Gatherings with Friends and Family:     Attends [employer] Services:     Active Member of Clubs or Organizations:     Attends Club or Organization Meetings:     Marital Status:    Intimate Partner Violence:     Fear of Current or Ex-Partner:     Emotionally Abused:     Physically Abused:     Sexually Abused:        Problem List  Patient Active Problem List   Diagnosis Code    HTN (hypertension) I10    Hyperlipidemia E78.5    Family history of heart attack, premature, brother 46s Z80.55    Prediabetes R73.03    Family history of diabetes mellitus type II, mother Z80.1    Vitamin D deficiency E55.9    ACP (advance care planning) Z71.89    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 HENT: Negative for congestion. Respiratory: Negative for cough and shortness of breath. Cardiovascular: Negative for chest pain. Gastrointestinal: Negative for abdominal pain, blood in stool, nausea and vomiting. Genitourinary: Negative. Musculoskeletal: Negative for falls and myalgias. Neurological: Negative for dizziness. Psychiatric/Behavioral: Negative for depression, substance abuse and suicidal ideas. Vital Signs  Vitals:    08/11/21 1310   BP: 124/79   Pulse: (!) 56   Resp: 20   Temp: 97.2 °F (36.2 °C)   TempSrc: Temporal   SpO2: 96%   Weight: 187 lb 12.8 oz (85.2 kg)   Height: 5' (1.524 m)   PainSc:   0 - No pain       Physical Exam  Physical Exam  Vitals reviewed. Constitutional:       Comments: + body odor   HENT:      Nose: Nose normal.      Mouth/Throat:      Mouth: Mucous membranes are moist.   Eyes:      Pupils: Pupils are equal, round, and reactive to light. Cardiovascular:      Rate and Rhythm: Regular rhythm. Bradycardia present. Pulses: Normal pulses. Heart sounds: Normal heart sounds. Comments: Pulse 56  Pulmonary:      Effort: Pulmonary effort is normal.      Breath sounds: Normal breath sounds. Abdominal:      General: Abdomen is flat. There is no distension. Tenderness: There is no abdominal tenderness. Musculoskeletal:         General: No tenderness. Right lower leg: Edema (+2 nonpitting) present. Left lower leg: Edema (+2 nonpitting) present. Skin:     General: Skin is warm and dry. Neurological:      Mental Status: She is alert and oriented to person, place, and time. Psychiatric:         Mood and Affect: Affect is flat.          Behavior: Behavior normal.         Diagnostics  Orders Placed This Encounter   [facility]     Referral Priority:   Routine     Referral Type:   Consultation     Referral Reason:   Specialty Services Required     Number of Visits Requested:   1    pravastatin (PRAVACHOL) 10 mg tablet     Sig: Take 1 Tablet by mouth nightly. Dispense:  30 Tablet     Refill:  1       Results  Results for orders placed or performed during the hospital encounter of 08/09/20   METABOLIC PANEL, COMPREHENSIVE   Result Value Ref Range    Sodium 142 136 - 145 mmol/L    Potassium 3.7 3.5 - 5.5 mmol/L    Chloride 110 100 - 111 mmol/L    CO2 27 21 - 32 mmol/L    Anion gap 5 3.0 - 18 mmol/L    Glucose 91 74 - 99 mg/dL    BUN 18 7.0 - 18 MG/DL    Creatinine 0.96 0.6 - 1.3 MG/DL    BUN/Creatinine ratio 19 12 - 20      GFR est AA >60 >60 ml/min/1.73m2    GFR est non-AA 58 (L) >60 ml/min/1.73m2    Calcium 9.4 8.5 - 10.1 MG/DL    Bilirubin, total 0.5 0.2 - 1.0 MG/DL    ALT (SGPT) 17 13 - 56 U/L    AST (SGOT) 17 10 - 38 U/L    Alk. phosphatase 101 45 - 117 U/L    Protein, total 7.7 6.4 - 8.2 g/dL    Albumin 3.6 3.4 - 5.0 g/dL    Globulin 4.1 (H) 2.0 - 4.0 g/dL    A-G Ratio 0.9 0.8 - 1.7     LIPID PANEL   Result Value Ref Range    LIPID PROFILE          Cholesterol, total 252 (H) <200 MG/DL    Triglyceride 62 <150 MG/DL    HDL Cholesterol 85 (H) 40 - 60 MG/DL    LDL, calculated 154.6 (H) 0 - 100 MG/DL    VLDL, calculated 12.4 MG/DL    CHOL/HDL Ratio 3.0 0 - 5.0     CBC WITH AUTOMATED DIFF   Result Value Ref Range    WBC 4.2 (L) 4.6 - 13.2 K/uL    RBC 5.18 4.20 - 5.30 M/uL    HGB 11.4 (L) 12.0 - 16.0 g/dL    HCT 38.6 35.0 - 45.0 %    MCV 74.5 74.0 - 97.0 FL    MCH 22.0 (L) 24.0 - 34.0 PG    MCHC 29.5 (L) 31.0 - 37.0 g/dL    RDW 16.4 (H) 11.6 - 14.5 %    PLATELET 465 508 - 765 K/uL    MPV 11.1 9.2 - 11.8 FL    NEUTROPHILS 61 40 - 73 %    LYMPHOCYTES 26 21 - 52 %    MONOCYTES 10 3 - 10 %    EOSINOPHILS 2 0 - 5 %    BASOPHILS 1 0 - 2 %    ABS. NEUTROPHILS 2.5 1.8 - 8.0 K/UL    ABS.  LYMPHOCYTES 1.1 0.9 - 3.6 K/UL    ABS. MONOCYTES 0.4 0.05 - 1.2 K/UL    ABS. EOSINOPHILS 0.1 0.0 - 0.4 K/UL    ABS. BASOPHILS 0.0 0.0 - 0.1 K/UL    DF AUTOMATED     HEMOGLOBIN A1C WITH EAG   Result Value Ref Range    Hemoglobin A1c 6.2 (H) 4.2 - 5.6 %    Est. average glucose 131 mg/dL       Assessment and Plan  Diagnoses and all orders for this visit:    1. Mixed hyperlipidemia  -     pravastatin (PRAVACHOL) 10 mg tablet; Take 1 Tablet by mouth nightly. 2. Encounter to discuss test results    3. Essential hypertension    4. Body odor    5. Controlled type 2 diabetes mellitus with complication, without long-term current use of insulin ([facility]    6. Bilateral leg edema  -     REFERRAL TO VASCULAR SURGERY    7. BMI 36.0-36.9,adult      Declines a foot exam - offered in office exam and a podiatry exam - patient has declined both. Go for eye exam.   Labs discussed. Diet discussed. Encouraged good hygiene. Agrees to start on cholesterol medications for now but she wants to continue to work on her diet. Medication, side effects, possible allergic reactions and warnings reviewed with patient. Patient verbalized understanding. Care gaps reviewed. After care summary printed and reviewed with patient. Plan reviewed with patient. Questions answered. Patient verbalized understanding of plan and is in agreement with plan. Patient to follow up in 2 weeks for [facility] or earlier if symptoms worsen.      Anthony Hazel, ABRAM-C

## 2021-08-11 NOTE — PROGRESS NOTES
Tabatha Romeo presents today for   Chief Complaint   Patient presents with    Follow-up     1 month    Hypertension    Cholesterol Problem     high chol    Results     discuss lab results       Is someone accompanying this pt? no    Is the patient using any DME equipment during [address]? no    Depression Screening:  3 most recent PHQ Screens 8/11/2021   Little interest or pleasure in doing things Not at all   Feeling down, depressed, irritable, or hopeless Not at all   Total Score PHQ 2 0       Learning Assessment:  Learning Assessment 4/28/2021   PRIMARY LEARNER Patient   HIGHEST LEVEL OF EDUCATION - PRIMARY LEARNER  SOME COLLEGE   BARRIERS PRIMARY LEARNER NONE   CO-LEARNER CAREGIVER No   PRIMARY LANGUAGE ENGLISH    NEED No   LEARNER PREFERENCE PRIMARY DEMONSTRATION     -     -   ANSWERED BY patient   RELATIONSHIP SELF       Abuse Screening:  Abuse Screening Questionnaire 4/28/2021   Do you ever feel afraid of your partner? N   Are you in a relationship with someone who physically or mentally threatens you? N   Is it safe for you to go home? Y       Fall Screening  Fall Risk Assessment, last 12 mths 4/28/2021   Able to walk? Yes   Fall in past 12 months? 0   Do you feel unsteady? 0   Are you worried about falling 0       Generalized Anxiety  No flowsheet data found. Health Maintenance Due   Topic Date Due    Foot Exam Q1  Never done    Eye Exam Retinal or Dilated  Never done    COVID-19 Vaccine (1) Never done    DTaP/Tdap/Td series (1 - Tdap) Never done    Shingrix Vaccine Age 50> (1 of 2) Never done    Bone Densitometry (Dexa) Screening  Never done    Medicare Yearly Exam  01/09/2021   . Health Maintenance reviewed and discussed and ordered per Provider. Coordination of Care  1. Have you been to the ER, urgent care clinic since your last visit? Hospitalized since your last visit? no    2.  Have you seen or consulted any other health care providers outside of the [facility] since your last visit? Include any pap smears or colon screening.  no      Advance Directive:  Discussed 4/28/21

## 2021-09-02 ENCOUNTER — VIRTUAL VISIT (OUTPATIENT)
Dept: FAMILY MEDICINE CLINIC | Age: 71
End: 2021-09-02
Payer: MEDICARE

## 2021-09-02 DIAGNOSIS — Z00.00 MEDICARE ANNUAL WELLNESS VISIT, SUBSEQUENT: Primary | ICD-10-CM

## 2021-09-02 DIAGNOSIS — Z71.89 ADVANCE CARE PLANNING: ICD-10-CM

## 2021-09-02 PROCEDURE — G8427 DOCREV CUR MEDS BY ELIG CLIN: HCPCS | Performed by: NURSE PRACTITIONER

## 2021-09-02 PROCEDURE — 99497 ADVNCD CARE PLAN 30 MIN: CPT | Performed by: NURSE PRACTITIONER

## 2021-09-02 PROCEDURE — G8400 PT W/DXA NO RESULTS DOC: HCPCS | Performed by: NURSE PRACTITIONER

## 2021-09-02 PROCEDURE — G9717 DOC PT DX DEP/BP F/U NT REQ: HCPCS | Performed by: NURSE PRACTITIONER

## 2021-09-02 PROCEDURE — 3017F COLORECTAL CA SCREEN DOC REV: CPT | Performed by: NURSE PRACTITIONER

## 2021-09-02 PROCEDURE — G9899 SCRN MAM PERF RSLTS DOC: HCPCS | Performed by: NURSE PRACTITIONER

## 2021-09-02 PROCEDURE — G8756 NO BP MEASURE DOC: HCPCS | Performed by: NURSE PRACTITIONER

## 2021-09-02 PROCEDURE — G8417 CALC BMI ABV UP PARAM F/U: HCPCS | Performed by: NURSE PRACTITIONER

## 2021-09-02 PROCEDURE — 1101F PT FALLS ASSESS-DOCD LE1/YR: CPT | Performed by: NURSE PRACTITIONER

## 2021-09-02 PROCEDURE — G8536 NO DOC ELDER MAL SCRN: HCPCS | Performed by: NURSE PRACTITIONER

## 2021-09-02 PROCEDURE — G0439 PPPS, SUBSEQ VISIT: HCPCS | Performed by: NURSE PRACTITIONER

## 2021-09-02 NOTE — PROGRESS NOTES
This is the Subsequent Medicare Annual Wellness Exam, performed 12 months or more after the Initial AWV or the last Subsequent AWV    I have reviewed the patient's medical history in detail and updated the computerized patient record. Assessment/Plan   Education and counseling provided:  Are appropriate based on today's review and evaluation  End-of-Life planning (with patient's consent)  Pneumococcal Vaccine  Influenza Vaccine  Screening Mammography  Screening Pap and pelvic (covered once every 2 years)  Colorectal cancer screening tests  Cardiovascular screening blood test  Bone mass measurement (DEXA)  Screening for glaucoma  Diabetes screening test    1. Medicare annual wellness visit, subsequent  2. Advance care planning       Depression Risk Factor Screening:     3 most recent PHQ Screens 9/2/2021   Little interest or pleasure in doing things Not at all   Feeling down, depressed, irritable, or hopeless Not at all   Total Score PHQ 2 0       Alcohol Risk Screen    Do you average more than 1 drink per night or more than 7 drinks a week:  No    On any one occasion in the past three months have you have had more than 3 drinks containing alcohol:  No        Functional Ability and Level of Safety:    Hearing: Hearing is good. Activities of Daily Living: The home contains: no safety equipment. Patient does total self care      Ambulation: with no difficulty     Fall Risk:  Fall Risk Assessment, last 12 mths 4/28/2021   Able to walk? Yes   Fall in past 12 months? 0   Do you feel unsteady?  0   Are you worried about falling 0      Abuse Screen:  Patient is not abused       Cognitive Screening    Has your family/caregiver stated any concerns about your memory: no    Cognitive Screening: Recall 2/3 words    Health Maintenance Due     Health Maintenance Due   Topic Date Due    Eye Exam Retinal or Dilated  Never done    COVID-19 Vaccine (1) Never done    DTaP/Tdap/Td series (1 - Tdap) Never done   [employer] Shingrix Vaccine Age 50> (1 of 2) Never done    Bone Densitometry (Dexa) Screening  Never done    Flu Vaccine (1) 2021       Patient Care Team   Patient Care Team:  Ana Pitts NP as PCP - General (Nurse Practitioner)  Ana Pitts NP as PCP - [facility] Empaneled Provider  Karin Raman MD (Pulmonary Disease)    History     Patient Active Problem List   Diagnosis Code    HTN (hypertension) I10    Hyperlipidemia E78.5    Family history of heart attack, premature, brother 46s Z80.55    Prediabetes R78.1    Family history of diabetes mellitus type II, mother Z80.1    Vitamin D deficiency E55.9    ACP (advance care planning) Z71.89    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    Obesity, morbid ([address] E66.01    Acute confusion R41.0    Altered mental status R41.82    Positive serology for syphilis A53.0    Schizoaffective disorder, bipolar type ([address] F25.0    Controlled type 2 diabetes mellitus with complication, without long-term current use of insulin ([address] E11.8     Past Medical History:   Diagnosis Date    Asthma     Hypertension     Latent syphilis 3/22/2017    Osteoarthritis of ankle     Right ankle pain     posterior tibial tendon interstitial tear    Right foot pain     hindfoot arthrosis    Sleep disorder       Past Surgical History:   Procedure Laterality Date    HX  SECTION       Current Outpatient Medications   Medication Sig Dispense Refill    pravastatin (PRAVACHOL) 10 mg tablet Take 1 Tablet by mouth nightly.  30 Tablet 1    furosemide (LASIX) 40 mg tablet TAKE 1 TABLET BY MOUTH ONCE DAILY 90 Tablet 0    lisinopriL (PRINIVIL, ZESTRIL) 20 mg tablet Take 1 tablet by mouth once daily 90 Tablet 0    FLOVENT DISKUS 100 mcg/actuation dsdv INHALE 1 PUFF BY MOUTH TWICE DAILY 1 Each 2    VENTOLIN HFA 90 mcg/actuation inhaler INHALE 1-2 PUFFS EVERY 4HRS AS NEEDED FOR WHEEZE  0    Nebulizer Accessories kit To use with nebulizer 1 Kit 0    Nebulizer & Compressor machine 1 Each by Does Not Apply route daily as needed. 1 Each 0    potassium chloride SR (K-TAB) 20 mEq tablet Take 1 Tab by mouth daily. 27 Tab 3     No Known Allergies    Family History   Problem Relation Age of Onset    Diabetes Mother     Hypertension Mother     Heart Disease Father     Hypertension Father     Gout Father     Heart Attack Brother     Schizophrenia Brother      Social History     Tobacco Use    Smoking status: Never Smoker    Smokeless tobacco: Never Used   Substance Use Topics    Alcohol use: No       Iris P Rico, who was evaluated through a synchronous (real-time) audio only encounter, and/or her healthcare decision maker, is aware that it is a billable service, with coverage as determined by her insurance carrier. She provided verbal consent to proceed: Yes, and patient identification was verified. It was conducted pursuant to the emergency declaration under the [address] [place] [address], [address] [place] [address] authority and the [email or web address] General Act. A caregiver was present when appropriate. Ability to conduct physical exam was limited. I was in the office. The patient was at home.     Gloria Caballero DNP, FNP-C

## 2021-09-02 NOTE — PATIENT INSTRUCTIONS

## 2021-09-02 NOTE — PROGRESS NOTES
Rodolfo Pineda presents today for   Chief Complaint   Patient presents with   [facility]     Medicare       Is someone accompanying this pt? no    Is the patient using any DME equipment during [address]? no    Depression Screening:  3 most recent PHQ Screens 9/2/2021   Little interest or pleasure in doing things Not at all   Feeling down, depressed, irritable, or hopeless Not at all   Total Score PHQ 2 0       Learning Assessment:  Learning Assessment 4/28/2021   PRIMARY LEARNER Patient   HIGHEST LEVEL OF EDUCATION - PRIMARY LEARNER  SOME [address] PRIMARY LEARNER NONE   CO-LEARNER CAREGIVER No   PRIMARY LANGUAGE ENGLISH    NEED No   LEARNER PREFERENCE PRIMARY DEMONSTRATION     -     -   ANSWERED BY patient   RELATIONSHIP SELF       Abuse Screening:  Abuse Screening Questionnaire 4/28/2021   Do you ever feel afraid of your partner? N   Are you in a relationship with someone who physically or mentally threatens you? N   Is it safe for you to go home? Y       Fall Screening  Fall Risk Assessment, last 12 mths 4/28/2021   Able to walk? Yes   Fall in past 12 months? 0   Do you feel unsteady? 0   Are you worried about falling 0       Generalized Anxiety  No flowsheet data found. Health Maintenance Due   Topic Date Due    Eye Exam Retinal or Dilated  Never done    COVID-19 Vaccine (1) Never done    DTaP/Tdap/Td series (1 - Tdap) Never done    Shingrix Vaccine Age 50> (1 of 2) Never done    Bone Densitometry (Dexa) Screening  Never done    Medicare Yearly Exam  01/09/2021    Flu Vaccine (1) 09/01/2021   . Health Maintenance reviewed and discussed and ordered per Provider. Coordination of Care  1. Have you been to the ER, urgent care clinic since your last visit? Hospitalized since your last visit? no    2. Have you seen or consulted any other health care providers outside of the [address] [place] [address] since your last visit? Include any pap smears or colon screening. no      Advance Directive:  Discussed 4/28/21

## 2021-09-02 NOTE — PROGRESS NOTES
Advance Care Planning     Advance Care Planning (ACP) Physician/NP/PA Conversation      Date of Conversation: 9/2/2021  Conducted with: Patient with Decision Making Capacity    Healthcare Decision Maker:   No healthcare decision makers have been documented. Click here to complete [address] including selection of the Healthcare Decision Maker Relationship (ie \"Primary\")    Today we documented Decision Maker(s) consistent with Legal Next of Kin hierarchy. Patient is  and has one daughter who she has listed as her primary decision maker. Care Preferences:    Hospitalization: \"If your health worsens and it becomes clear that your chance of recovery is unlikely, what would be your preference regarding hospitalization? \"  The patient would prefer hospitalization. Ventilation: \"If you were unable to breathe on your own and your chance of recovery was unlikely, what would be your preference about the use of a ventilator (breathing machine) if it was available to you? \"   The patient would desire the use of a ventilator. Resuscitation: \"In the event your heart stopped as a result of an underlying serious health condition, would you want attempts to be made to restart your heart, or would you prefer a natural death? \"   Yes, attempt to resuscitate.     Additional topics discussed: hospice care and Organ doantion - VA DL, VA advanced Directive Document    Conversation Outcomes / Follow-Up Plan:   ACP in process - information provided, considering goals and options  Reviewed DNR/DNI and patient elects Full Code (Attempt Resuscitation)     Length of Voluntary ACP Conversation in minutes:  16 minutes    Amalia Shelton NP

## 2021-09-24 DIAGNOSIS — E78.2 MIXED HYPERLIPIDEMIA: ICD-10-CM

## 2021-09-24 RX ORDER — PRAVASTATIN SODIUM 10 MG/1
10 TABLET ORAL
Qty: 90 TABLET | Refills: 2 | Status: SHIPPED | OUTPATIENT
Start: 2021-09-24 | End: 2021-10-11

## 2021-09-24 NOTE — TELEPHONE ENCOUNTER
Received faxed refill request from [address] [place] [address] Rx, requesting 90 days    Last visit 9/2/21, labs 7/29/21, next appt 12/14/21    Requested Prescriptions     Pending Prescriptions Disp Refills    pravastatin (PRAVACHOL) 10 mg tablet 90 Tablet 0     Sig: Take 1 Tablet by mouth nightly.

## 2021-10-27 ENCOUNTER — OFFICE VISIT (OUTPATIENT)
Dept: FAMILY MEDICINE CLINIC | Age: 71
End: 2021-10-27
Payer: MEDICARE

## 2021-10-27 VITALS
WEIGHT: 180 LBS | HEART RATE: 66 BPM | OXYGEN SATURATION: 97 % | DIASTOLIC BLOOD PRESSURE: 81 MMHG | TEMPERATURE: 98.2 F | BODY MASS INDEX: 35.34 KG/M2 | SYSTOLIC BLOOD PRESSURE: 133 MMHG | HEIGHT: 60 IN | RESPIRATION RATE: 16 BRPM

## 2021-10-27 DIAGNOSIS — I10 ESSENTIAL HYPERTENSION: ICD-10-CM

## 2021-10-27 DIAGNOSIS — E11.8 CONTROLLED TYPE 2 DIABETES MELLITUS WITH COMPLICATION, WITHOUT LONG-TERM CURRENT USE OF INSULIN (HCC): Primary | ICD-10-CM

## 2021-10-27 DIAGNOSIS — Z02.89 ENCOUNTER FOR COMPLETION OF FORM WITH PATIENT: ICD-10-CM

## 2021-10-27 DIAGNOSIS — Z71.89 COUNSELING ON HEALTH PROMOTION AND DISEASE PREVENTION: ICD-10-CM

## 2021-10-27 DIAGNOSIS — R60.0 BILATERAL LEG EDEMA: ICD-10-CM

## 2021-10-27 DIAGNOSIS — F25.0 SCHIZOAFFECTIVE DISORDER, BIPOLAR TYPE (HCC): ICD-10-CM

## 2021-10-27 PROCEDURE — G9899 SCRN MAM PERF RSLTS DOC: HCPCS | Performed by: NURSE PRACTITIONER

## 2021-10-27 PROCEDURE — 1101F PT FALLS ASSESS-DOCD LE1/YR: CPT | Performed by: NURSE PRACTITIONER

## 2021-10-27 PROCEDURE — G9717 DOC PT DX DEP/BP F/U NT REQ: HCPCS | Performed by: NURSE PRACTITIONER

## 2021-10-27 PROCEDURE — G8427 DOCREV CUR MEDS BY ELIG CLIN: HCPCS | Performed by: NURSE PRACTITIONER

## 2021-10-27 PROCEDURE — G8417 CALC BMI ABV UP PARAM F/U: HCPCS | Performed by: NURSE PRACTITIONER

## 2021-10-27 PROCEDURE — G8400 PT W/DXA NO RESULTS DOC: HCPCS | Performed by: NURSE PRACTITIONER

## 2021-10-27 PROCEDURE — 99212 OFFICE O/P EST SF 10 MIN: CPT | Performed by: NURSE PRACTITIONER

## 2021-10-27 PROCEDURE — 2022F DILAT RTA XM EVC RTNOPTHY: CPT | Performed by: NURSE PRACTITIONER

## 2021-10-27 PROCEDURE — 3017F COLORECTAL CA SCREEN DOC REV: CPT | Performed by: NURSE PRACTITIONER

## 2021-10-27 PROCEDURE — G8754 DIAS BP LESS 90: HCPCS | Performed by: NURSE PRACTITIONER

## 2021-10-27 PROCEDURE — G8536 NO DOC ELDER MAL SCRN: HCPCS | Performed by: NURSE PRACTITIONER

## 2021-10-27 PROCEDURE — 1090F PRES/ABSN URINE INCON ASSESS: CPT | Performed by: NURSE PRACTITIONER

## 2021-10-27 PROCEDURE — G8752 SYS BP LESS 140: HCPCS | Performed by: NURSE PRACTITIONER

## 2021-10-27 PROCEDURE — 3044F HG A1C LEVEL LT 7.0%: CPT | Performed by: NURSE PRACTITIONER

## 2021-10-27 NOTE — LETTER
NOTIFICATION RETURN TO WORK / SCHOOL    10/27/2021 3:33 PM    Ms. Leela Gómez  [address] [facility]      To Whom It May Concern:    Suad Rodriguez is currently under the care of Simran Hassan. Her daughter Kaylan Perkins attended her appointment with her. If there are questions or concerns please have the patient contact our office.         Sincerely,      Jimmy Story NP

## 2021-10-27 NOTE — PROGRESS NOTES
Brittany Zazueta presents today for   Chief Complaint   Patient presents with    Form Completion     handicap DMV       Is someone accompanying this pt? Yes, daughter    Is the patient using any DME equipment during [address]? no    Depression Screening:  3 most recent PHQ Screens 10/27/2021   Little interest or pleasure in doing things Not at all   Feeling down, depressed, irritable, or hopeless Not at all   Total Score PHQ 2 0       Learning Assessment:  Learning Assessment 4/28/2021   PRIMARY LEARNER Patient   HIGHEST LEVEL OF EDUCATION - PRIMARY LEARNER  SOME COLLEGE   BARRIERS PRIMARY LEARNER NONE   CO-LEARNER CAREGIVER No   PRIMARY LANGUAGE ENGLISH    NEED No   LEARNER PREFERENCE PRIMARY DEMONSTRATION     -     -   ANSWERED BY patient   RELATIONSHIP SELF       Abuse Screening:  Abuse Screening Questionnaire 4/28/2021   Do you ever feel afraid of your partner? N   Are you in a relationship with someone who physically or mentally threatens you? N   Is it safe for you to go home? Y       Fall Screening  Fall Risk Assessment, last 12 mths 4/28/2021   Able to walk? Yes   Fall in past 12 months? 0   Do you feel unsteady? 0   Are you worried about falling 0       Generalized Anxiety  No flowsheet data found. Health Maintenance Due   Topic Date Due    Eye Exam Retinal or Dilated  Never done    COVID-19 Vaccine (1) Never done    DTaP/Tdap/Td series (1 - Tdap) Never done    Shingrix Vaccine Age 50> (1 of 2) Never done    Bone Densitometry (Dexa) Screening  Never done    Flu Vaccine (1) 09/01/2021   . Health Maintenance reviewed and discussed and ordered per Provider. Coordination of Care  1. Have you been to the ER, urgent care clinic since your last visit? Hospitalized since your last visit? no    2. Have you seen or consulted any other health care providers outside of the [address] [place] [address] since your last visit? Include any pap smears or colon screening.  no      Advance Directive: Discussed 4/28/21

## 2021-10-27 NOTE — PROGRESS NOTES
ROYAL  Beverley Lombardo is a 73 y.o. female  Chief Complaint   Patient presents with    Form Completion     handicap DMV     Ms. Gómez attends her appointment with her daughter Maria Ines Villalta. She is in need of her handicap form completed for DMV. She denies any chest pains and or shortness of breath today. She states that she has been walking some and trying to get her blood glucose down. She states that she has changed her diet and she is only eating a very little sugar. She states that she is not seeing the psychiatrist.  She denies any desire to hurt or harm herself or anyone else. She denies any suicidal ideations. She denies any swelling in her legs on today's visit. Past Medical History  Past Medical History:   Diagnosis Date    Asthma     Hypertension     Latent syphilis 3/22/2017    Osteoarthritis of ankle     Right ankle pain     posterior tibial tendon interstitial tear    Right foot pain     hindfoot arthrosis    Sleep disorder        Surgical History  Past Surgical History:   Procedure Laterality Date    HX  SECTION          Medications  Current Outpatient Medications   Medication Sig Dispense Refill    pravastatin (PRAVACHOL) 10 mg tablet Take 1 tablet by mouth nightly 30 Tablet 0    lisinopriL (PRINIVIL, ZESTRIL) 20 mg tablet Take 1 tablet by mouth once daily 90 Tablet 1    furosemide (LASIX) 40 mg tablet TAKE 1 TABLET BY MOUTH ONCE DAILY 90 Tablet 0    FLOVENT DISKUS 100 mcg/actuation dsdv INHALE 1 PUFF BY MOUTH TWICE DAILY 1 Each 2    Nebulizer Accessories kit To use with nebulizer 1 Kit 0    Nebulizer & Compressor machine 1 Each by Does Not Apply route daily as needed. 1 Each 0    VENTOLIN HFA 90 mcg/actuation inhaler INHALE 1-2 PUFFS EVERY 4HRS AS NEEDED FOR WHEEZE  0    potassium chloride SR (K-TAB) 20 mEq tablet Take 1 Tab by mouth daily.  30 Tab 3       Allergies  No Known Allergies    Family History  Family History   Problem Relation Age of Onset    Diabetes Mother     Hypertension Mother     Heart Disease Father     Hypertension Father    [facility] Gout Father     Heart Attack Brother     Schizophrenia Brother        Social History  Social History     Socioeconomic History    Marital status:      Spouse name: Not on file    Number of children: Not on file    Years of education: Not on file    Highest education level: Not on file   Occupational History    Not on file   Tobacco Use    Smoking status: Never Smoker    Smokeless tobacco: Never Used   Substance and Sexual Activity    Alcohol use: No    Drug use: No    Sexual activity: Never   Other Topics Concern    Not on file   Social History Narrative    Not on file     Social Determinants of Health     Financial Resource Strain:     Difficulty of Paying Living Expenses:    Food Insecurity:     Worried About Running Out of Food in the Last Year:     Ran Out of Food in the Last Year:    Transportation Needs:     Lack of Transportation (Medical):      Lack of Transportation (Non-Medical):    Physical Activity:     Days of Exercise per Week:     Minutes of Exercise per Session:    Stress:     Feeling of Stress :    Social Connections:     Frequency of Communication with Friends and Family:     Frequency of Social Gatherings with Friends and Family:     Attends [employer] Services:     Active Member of Clubs or Organizations:     Attends Club or Organization Meetings:     Marital Status:    Intimate Partner Violence:     Fear of Current or Ex-Partner:     Emotionally Abused:     Physically Abused:     Sexually Abused:        Problem List  Patient Active Problem List   Diagnosis Code    HTN (hypertension) I10    Hyperlipidemia E78.5    Family history of heart attack, premature, brother 46s Z80.55    Prediabetes R73.03    Family history of diabetes mellitus type II, mother Z80.1    Vitamin D deficiency E55.9    ACP (advance care planning) Z70.80    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 HENT: Negative for congestion. Eyes: Negative for blurred vision. Respiratory: Negative for cough and shortness of breath. Cardiovascular: Negative for chest pain, palpitations and leg swelling. Gastrointestinal: Negative for abdominal pain, nausea and vomiting. Genitourinary: Negative. Musculoskeletal: Negative for falls. Neurological: Negative for dizziness. Psychiatric/Behavioral: Negative for depression and suicidal ideas. Vital Signs  Vitals:    10/27/21 1512   BP: 133/81   Pulse: 66   Resp: 16   Temp: 98.2 °F (36.8 °C)   TempSrc: Oral   SpO2: 97%   Weight: 180 lb (81.6 kg)   Height: 5' (1.524 m)   PainSc:   0 - No pain       Physical Exam  Physical Exam  Vitals reviewed. HENT:      Mouth/Throat:      Mouth: Mucous membranes are moist.   Cardiovascular:      Rate and Rhythm: Normal rate and regular rhythm. Pulmonary:      Effort: Pulmonary effort is normal.      Breath sounds: Normal breath sounds. Abdominal:      General: There is no distension. Palpations: Abdomen is soft. Tenderness: There is no abdominal tenderness. Musculoskeletal:      Right lower leg: No edema. Left lower leg: No edema. Neurological:      Mental Status: She is alert and oriented to person, place, and time. Psychiatric:         Mood and Affect: Mood normal.         Behavior: Behavior normal.         Thought Content: Thought content normal.         Cognition and Memory: Memory is impaired. Comments: Alert and oriented. Forgetful.           Diagnostics  No orders of the defined types were placed in this encounter. Results  Results for orders placed or performed during the hospital encounter of 99/76/21   METABOLIC PANEL, COMPREHENSIVE   Result Value Ref Range    Sodium 142 136 - 145 mmol/L    Potassium 3.7 3.5 - 5.5 mmol/L    Chloride 110 100 - 111 mmol/L    CO2 27 21 - 32 mmol/L    Anion gap 5 3.0 - 18 mmol/L    Glucose 91 74 - 99 mg/dL    BUN 18 7.0 - 18 MG/DL    Creatinine 0.96 0.6 - 1.3 MG/DL    BUN/Creatinine ratio 19 12 - 20      GFR est AA >60 >60 ml/min/1.73m2    GFR est non-AA 58 (L) >60 ml/min/1.73m2    Calcium 9.4 8.5 - 10.1 MG/DL    Bilirubin, total 0.5 0.2 - 1.0 MG/DL    ALT (SGPT) 17 13 - 56 U/L    AST (SGOT) 17 10 - 38 U/L    Alk. phosphatase 101 45 - 117 U/L    Protein, total 7.7 6.4 - 8.2 g/dL    Albumin 3.6 3.4 - 5.0 g/dL    Globulin 4.1 (H) 2.0 - 4.0 g/dL    A-G Ratio 0.9 0.8 - 1.7     LIPID PANEL   Result Value Ref Range    LIPID PROFILE          Cholesterol, total 252 (H) <200 MG/DL    Triglyceride 62 <150 MG/DL    HDL Cholesterol 85 (H) 40 - 60 MG/DL    LDL, calculated 154.6 (H) 0 - 100 MG/DL    VLDL, calculated 12.4 MG/DL    CHOL/HDL Ratio 3.0 0 - 5.0     CBC WITH AUTOMATED DIFF   Result Value Ref Range    WBC 4.2 (L) 4.6 - 13.2 K/uL    RBC 5.18 4.20 - 5.30 M/uL    HGB 11.4 (L) 12.0 - 16.0 g/dL    HCT 38.6 35.0 - 45.0 %    MCV 74.5 74.0 - 97.0 FL    MCH 22.0 (L) 24.0 - 34.0 PG    MCHC 29.5 (L) 31.0 - 37.0 g/dL    RDW 16.4 (H) 11.6 - 14.5 %    PLATELET 350 512 - 895 K/uL    MPV 11.1 9.2 - 11.8 FL    NEUTROPHILS 61 40 - 73 %    LYMPHOCYTES 26 21 - 52 %    MONOCYTES 10 3 - 10 %    EOSINOPHILS 2 0 - 5 %    BASOPHILS 1 0 - 2 %    ABS. NEUTROPHILS 2.5 1.8 - 8.0 K/UL    ABS. LYMPHOCYTES 1.1 0.9 - 3.6 K/UL    ABS. MONOCYTES 0.4 0.05 - 1.2 K/UL    ABS. EOSINOPHILS 0.1 0.0 - 0.4 K/UL    ABS.  BASOPHILS 0.0 0.0 - 0.1 K/UL    DF AUTOMATED     HEMOGLOBIN A1C WITH EAG   Result Value Ref Range    Hemoglobin A1c 6.2 (H) 4.2 - 5.6 %    Est. average glucose 131 mg/dL       Assessment and Plan  Diagnoses and all orders for this visit:    1. Controlled type 2 diabetes mellitus with complication, without long-term current use of insulin ([facility]    2. Essential hypertension    3. Schizoaffective disorder, bipolar type ([facility]    4. Bilateral leg edema    5. BMI 35.0-35.9,adult    6. Counseling on health promotion and disease prevention    7. Encounter for completion of form with patient    Form completed. Patient is to continue with diet and exercise. Daughter given a letter for attending appointment with patient. After care summary printed and reviewed with patient. Plan reviewed with patient. Questions answered. Patient verbalized understanding of plan and is in agreement with plan. Patient to follow up in 3 months or earlier if symptoms worsen.      KWAME SuazoC

## 2021-12-14 ENCOUNTER — HOSPITAL ENCOUNTER (OUTPATIENT)
Dept: LAB | Age: 71
Discharge: HOME | End: 2021-12-14
Payer: MEDICARE

## 2021-12-14 ENCOUNTER — OFFICE VISIT (OUTPATIENT)
Dept: FAMILY MEDICINE CLINIC | Age: 71
End: 2021-12-14
Payer: MEDICARE

## 2021-12-14 VITALS
OXYGEN SATURATION: 98 % | DIASTOLIC BLOOD PRESSURE: 70 MMHG | SYSTOLIC BLOOD PRESSURE: 105 MMHG | HEART RATE: 65 BPM | BODY MASS INDEX: 36.05 KG/M2 | TEMPERATURE: 97.7 F | HEIGHT: 60 IN | RESPIRATION RATE: 16 BRPM | WEIGHT: 183.6 LBS

## 2021-12-14 DIAGNOSIS — E11.8 CONTROLLED TYPE 2 DIABETES MELLITUS WITH COMPLICATION, WITHOUT LONG-TERM CURRENT USE OF INSULIN (HCC): Primary | ICD-10-CM

## 2021-12-14 DIAGNOSIS — E78.2 MIXED HYPERLIPIDEMIA: ICD-10-CM

## 2021-12-14 DIAGNOSIS — F25.0 SCHIZOAFFECTIVE DISORDER, BIPOLAR TYPE (HCC): ICD-10-CM

## 2021-12-14 DIAGNOSIS — E11.8 CONTROLLED TYPE 2 DIABETES MELLITUS WITH COMPLICATION, WITHOUT LONG-TERM CURRENT USE OF INSULIN (HCC): ICD-10-CM

## 2021-12-14 DIAGNOSIS — R60.0 BILATERAL LEG EDEMA: ICD-10-CM

## 2021-12-14 DIAGNOSIS — I10 ESSENTIAL HYPERTENSION: ICD-10-CM

## 2021-12-14 LAB
CREAT UR-MCNC: 95 MG/DL (ref 30–125)
HBA1C MFR BLD HPLC: 5.6 %
MICROALBUMIN UR-MCNC: 1.61 MG/DL (ref 0–3)
MICROALBUMIN/CREAT UR-RTO: 17 MG/G (ref 0–30)

## 2021-12-14 PROCEDURE — G9899 SCRN MAM PERF RSLTS DOC: HCPCS | Performed by: NURSE PRACTITIONER

## 2021-12-14 PROCEDURE — 3017F COLORECTAL CA SCREEN DOC REV: CPT | Performed by: NURSE PRACTITIONER

## 2021-12-14 PROCEDURE — 1101F PT FALLS ASSESS-DOCD LE1/YR: CPT | Performed by: NURSE PRACTITIONER

## 2021-12-14 PROCEDURE — 1090F PRES/ABSN URINE INCON ASSESS: CPT | Performed by: NURSE PRACTITIONER

## 2021-12-14 PROCEDURE — 82043 UR ALBUMIN QUANTITATIVE: CPT

## 2021-12-14 PROCEDURE — G9717 DOC PT DX DEP/BP F/U NT REQ: HCPCS | Performed by: NURSE PRACTITIONER

## 2021-12-14 PROCEDURE — G8400 PT W/DXA NO RESULTS DOC: HCPCS | Performed by: NURSE PRACTITIONER

## 2021-12-14 PROCEDURE — 83036 HEMOGLOBIN GLYCOSYLATED A1C: CPT | Performed by: NURSE PRACTITIONER

## 2021-12-14 PROCEDURE — G8536 NO DOC ELDER MAL SCRN: HCPCS | Performed by: NURSE PRACTITIONER

## 2021-12-14 PROCEDURE — 2022F DILAT RTA XM EVC RTNOPTHY: CPT | Performed by: NURSE PRACTITIONER

## 2021-12-14 PROCEDURE — 99214 OFFICE O/P EST MOD 30 MIN: CPT | Performed by: NURSE PRACTITIONER

## 2021-12-14 PROCEDURE — G8417 CALC BMI ABV UP PARAM F/U: HCPCS | Performed by: NURSE PRACTITIONER

## 2021-12-14 PROCEDURE — 3044F HG A1C LEVEL LT 7.0%: CPT | Performed by: NURSE PRACTITIONER

## 2021-12-14 PROCEDURE — G8427 DOCREV CUR MEDS BY ELIG CLIN: HCPCS | Performed by: NURSE PRACTITIONER

## 2021-12-14 PROCEDURE — G8752 SYS BP LESS 140: HCPCS | Performed by: NURSE PRACTITIONER

## 2021-12-14 PROCEDURE — G8754 DIAS BP LESS 90: HCPCS | Performed by: NURSE PRACTITIONER

## 2021-12-14 NOTE — PROGRESS NOTES
Katie Guido presents today for   Chief Complaint   Patient presents with    Follow-up       Is someone accompanying this pt? no    Is the patient using any DME equipment during OV? no    Depression Screening:  3 most recent PHQ Screens 12/14/2021   Little interest or pleasure in doing things Not at all   Feeling down, depressed, irritable, or hopeless Not at all   Total Score PHQ 2 0       Learning Assessment:  Learning Assessment 4/28/2021   PRIMARY LEARNER Patient   HIGHEST LEVEL OF EDUCATION - PRIMARY LEARNER  SOME COLLEGE   BARRIERS PRIMARY LEARNER NONE   CO-LEARNER CAREGIVER No   PRIMARY LANGUAGE ENGLISH    NEED No   LEARNER PREFERENCE PRIMARY DEMONSTRATION     -     -   ANSWERED BY patient   RELATIONSHIP SELF       Abuse Screening:  Abuse Screening Questionnaire 4/28/2021   Do you ever feel afraid of your partner? N   Are you in a relationship with someone who physically or mentally threatens you? N   Is it safe for you to go home? Y       Fall Screening  Fall Risk Assessment, last 12 mths 4/28/2021   Able to walk? Yes   Fall in past 12 months? 0   Do you feel unsteady? 0   Are you worried about falling 0       Generalized Anxiety  No flowsheet data found. Health Maintenance Due   Topic Date Due    Eye Exam Retinal or Dilated  Never done    COVID-19 Vaccine (1) Never done    DTaP/Tdap/Td series (1 - Tdap) Never done    Shingrix Vaccine Age 50> (1 of 2) Never done    Bone Densitometry (Dexa) Screening  Never done    Flu Vaccine (1) 09/01/2021    MICROALBUMIN Q1  01/13/2022   . Health Maintenance reviewed and discussed and ordered per Provider. Coordination of Care  1. Have you been to the ER, urgent care clinic since your last visit? Hospitalized since your last visit? no    2. Have you seen or consulted any other health care providers outside of the [address] [place] [address] since your last visit? Include any pap smears or colon screening.  no      Advance Directive: Discussed 4/28/21

## 2022-03-10 ENCOUNTER — HOSPITAL ENCOUNTER (OUTPATIENT)
Dept: LAB | Age: 72
Discharge: HOME | End: 2022-03-10
Payer: MEDICARE

## 2022-03-10 DIAGNOSIS — I10 ESSENTIAL HYPERTENSION: ICD-10-CM

## 2022-03-10 DIAGNOSIS — R60.0 BILATERAL LEG EDEMA: ICD-10-CM

## 2022-03-10 DIAGNOSIS — E11.8 CONTROLLED TYPE 2 DIABETES MELLITUS WITH COMPLICATION, WITHOUT LONG-TERM CURRENT USE OF INSULIN (HCC): ICD-10-CM

## 2022-03-10 LAB
ALBUMIN SERPL-MCNC: 3.3 G/DL (ref 3.4–5)
ALBUMIN/GLOB SERPL: 0.9 {RATIO} (ref 0.8–1.7)
ALP SERPL-CCNC: 143 U/L (ref 45–117)
ALT SERPL-CCNC: 18 U/L (ref 13–56)
ANION GAP SERPL CALC-SCNC: 3 MMOL/L (ref 3–18)
AST SERPL-CCNC: 14 U/L (ref 10–38)
BASOPHILS # BLD: 0 K/UL (ref 0–0.1)
BASOPHILS NFR BLD: 1 % (ref 0–2)
BILIRUB SERPL-MCNC: 0.3 MG/DL (ref 0.2–1)
BUN SERPL-MCNC: 23 MG/DL (ref 7–18)
BUN/CREAT SERPL: 22 (ref 12–20)
CALCIUM SERPL-MCNC: 9.2 MG/DL (ref 8.5–10.1)
CHLORIDE SERPL-SCNC: 111 MMOL/L (ref 100–111)
CHOLEST SERPL-MCNC: 215 MG/DL
CO2 SERPL-SCNC: 29 MMOL/L (ref 21–32)
CREAT SERPL-MCNC: 1.05 MG/DL (ref 0.6–1.3)
DIFFERENTIAL METHOD BLD: ABNORMAL
EOSINOPHIL # BLD: 0.1 K/UL (ref 0–0.4)
EOSINOPHIL NFR BLD: 3 % (ref 0–5)
ERYTHROCYTE [DISTWIDTH] IN BLOOD BY AUTOMATED COUNT: 16.7 % (ref 11.6–14.5)
GLOBULIN SER CALC-MCNC: 3.8 G/DL (ref 2–4)
GLUCOSE SERPL-MCNC: 92 MG/DL (ref 74–99)
HCT VFR BLD AUTO: 38.2 % (ref 35–45)
HDLC SERPL-MCNC: 100 MG/DL (ref 40–60)
HDLC SERPL: 2.2 {RATIO} (ref 0–5)
HGB BLD-MCNC: 11.4 G/DL (ref 12–16)
IMM GRANULOCYTES # BLD AUTO: 0 K/UL (ref 0–0.04)
IMM GRANULOCYTES NFR BLD AUTO: 0 % (ref 0–0.5)
LDLC SERPL CALC-MCNC: 101.8 MG/DL (ref 0–100)
LIPID PROFILE,FLP: ABNORMAL
LYMPHOCYTES # BLD: 1.2 K/UL (ref 0.9–3.6)
LYMPHOCYTES NFR BLD: 38 % (ref 21–52)
MCH RBC QN AUTO: 22.6 PG (ref 24–34)
MCHC RBC AUTO-ENTMCNC: 29.8 G/DL (ref 31–37)
MCV RBC AUTO: 75.8 FL (ref 78–100)
MONOCYTES # BLD: 0.4 K/UL (ref 0.05–1.2)
MONOCYTES NFR BLD: 11 % (ref 3–10)
NEUTS SEG # BLD: 1.6 K/UL (ref 1.8–8)
NEUTS SEG NFR BLD: 47 % (ref 40–73)
NRBC # BLD: 0 K/UL (ref 0–0.01)
NRBC BLD-RTO: 0 PER 100 WBC
PLATELET # BLD AUTO: 178 K/UL (ref 135–420)
PMV BLD AUTO: 11.2 FL (ref 9.2–11.8)
POTASSIUM SERPL-SCNC: 4.4 MMOL/L (ref 3.5–5.5)
PROT SERPL-MCNC: 7.1 G/DL (ref 6.4–8.2)
RBC # BLD AUTO: 5.04 M/UL (ref 4.2–5.3)
SODIUM SERPL-SCNC: 143 MMOL/L (ref 136–145)
TRIGL SERPL-MCNC: 66 MG/DL (ref ?–150)
VLDLC SERPL CALC-MCNC: 13.2 MG/DL
WBC # BLD AUTO: 3.3 K/UL (ref 4.6–13.2)

## 2022-03-10 PROCEDURE — 36415 COLL VENOUS BLD VENIPUNCTURE: CPT

## 2022-03-10 PROCEDURE — 80053 COMPREHEN METABOLIC PANEL: CPT

## 2022-03-10 PROCEDURE — 85025 COMPLETE CBC W/AUTO DIFF WBC: CPT

## 2022-03-10 PROCEDURE — 80061 LIPID PANEL: CPT

## 2022-03-17 ENCOUNTER — OFFICE VISIT (OUTPATIENT)
Dept: FAMILY MEDICINE CLINIC | Age: 72
End: 2022-03-17
Payer: MEDICARE

## 2022-03-17 VITALS
RESPIRATION RATE: 16 BRPM | HEART RATE: 62 BPM | BODY MASS INDEX: 38.43 KG/M2 | WEIGHT: 196.8 LBS | TEMPERATURE: 97.5 F | SYSTOLIC BLOOD PRESSURE: 116 MMHG | DIASTOLIC BLOOD PRESSURE: 71 MMHG | OXYGEN SATURATION: 98 %

## 2022-03-17 DIAGNOSIS — F25.0 SCHIZOAFFECTIVE DISORDER, BIPOLAR TYPE (HCC): ICD-10-CM

## 2022-03-17 DIAGNOSIS — E66.01 SEVERE OBESITY (BMI 35.0-39.9) WITH COMORBIDITY (HCC): ICD-10-CM

## 2022-03-17 DIAGNOSIS — E78.2 MIXED HYPERLIPIDEMIA: ICD-10-CM

## 2022-03-17 DIAGNOSIS — E11.8 CONTROLLED TYPE 2 DIABETES MELLITUS WITH COMPLICATION, WITHOUT LONG-TERM CURRENT USE OF INSULIN (HCC): Primary | ICD-10-CM

## 2022-03-17 DIAGNOSIS — I10 ESSENTIAL HYPERTENSION: ICD-10-CM

## 2022-03-17 PROCEDURE — 1090F PRES/ABSN URINE INCON ASSESS: CPT | Performed by: FAMILY MEDICINE

## 2022-03-17 PROCEDURE — G8427 DOCREV CUR MEDS BY ELIG CLIN: HCPCS | Performed by: FAMILY MEDICINE

## 2022-03-17 PROCEDURE — 2022F DILAT RTA XM EVC RTNOPTHY: CPT | Performed by: FAMILY MEDICINE

## 2022-03-17 PROCEDURE — 3017F COLORECTAL CA SCREEN DOC REV: CPT | Performed by: FAMILY MEDICINE

## 2022-03-17 PROCEDURE — G8536 NO DOC ELDER MAL SCRN: HCPCS | Performed by: FAMILY MEDICINE

## 2022-03-17 PROCEDURE — 99214 OFFICE O/P EST MOD 30 MIN: CPT | Performed by: FAMILY MEDICINE

## 2022-03-17 PROCEDURE — 3046F HEMOGLOBIN A1C LEVEL >9.0%: CPT | Performed by: FAMILY MEDICINE

## 2022-03-17 PROCEDURE — G8754 DIAS BP LESS 90: HCPCS | Performed by: FAMILY MEDICINE

## 2022-03-17 PROCEDURE — G9899 SCRN MAM PERF RSLTS DOC: HCPCS | Performed by: FAMILY MEDICINE

## 2022-03-17 PROCEDURE — G8417 CALC BMI ABV UP PARAM F/U: HCPCS | Performed by: FAMILY MEDICINE

## 2022-03-17 PROCEDURE — G8400 PT W/DXA NO RESULTS DOC: HCPCS | Performed by: FAMILY MEDICINE

## 2022-03-17 PROCEDURE — G9717 DOC PT DX DEP/BP F/U NT REQ: HCPCS | Performed by: FAMILY MEDICINE

## 2022-03-17 PROCEDURE — 1101F PT FALLS ASSESS-DOCD LE1/YR: CPT | Performed by: FAMILY MEDICINE

## 2022-03-17 PROCEDURE — 83036 HEMOGLOBIN GLYCOSYLATED A1C: CPT | Performed by: FAMILY MEDICINE

## 2022-03-17 PROCEDURE — G8752 SYS BP LESS 140: HCPCS | Performed by: FAMILY MEDICINE

## 2022-03-17 NOTE — PROGRESS NOTES
Chief Complaint   Patient presents with    Diabetes    Hypertension    Bipolar    Cholesterol Problem         HPI    Leela Gómez is a 81 y.o. female presenting today for 3 months  follow up of dm, htn, hld. She has been doing well overall. Patient had labs on 3/10/22. Labs reviewed in detail with patient     Patient does not need medication refills today. New concerns today: none      Review of Systems   Constitutional: Negative. HENT: Negative. Respiratory: Negative. Cardiovascular: Negative. All other systems reviewed and are negative. Physical Exam  Vitals and nursing note reviewed. Constitutional:       Appearance: Normal appearance. She is not ill-appearing. HENT:      Head: Normocephalic and atraumatic. Right Ear: External ear normal.      Left Ear: External ear normal.      Nose: Nose normal.      Mouth/Throat:      Mouth: Mucous membranes are moist.   Eyes:      Extraocular Movements: Extraocular movements intact. Conjunctiva/sclera: Conjunctivae normal.   Cardiovascular:      Rate and Rhythm: Normal rate and regular rhythm. Heart sounds: No murmur heard. No friction rub. No gallop. Pulmonary:      Effort: Pulmonary effort is normal.      Breath sounds: Normal breath sounds. No wheezing, rhonchi or rales. Musculoskeletal:         General: Normal range of motion. Cervical back: Normal range of motion. Skin:     General: Skin is warm and dry. Neurological:      Mental Status: She is alert and oriented to person, place, and time. Coordination: Coordination normal.   Psychiatric:         Mood and Affect: Mood normal.         Behavior: Behavior normal.         Thought Content: Thought content normal.         Judgment: Judgment normal.         Diagnoses and all orders for this visit:    1. Controlled type 2 diabetes mellitus with complication, without long-term current use of insulin (HCC)  -     AMB POC HEMOGLOBIN A1C    2.  Essential hypertension  Stable, cont pres tx plan. 3. Mixed hyperlipidemia  Stable, cont pres tx plan. 4. Severe obesity (BMI 35.0-39. 9) with comorbidity ([address]  Work on exercise and diet. 5. Schizoaffective disorder, bipolar type [facility])  Assessment & Plan:   monitored by specialist. No acute findings meriting change in the plan      Follow-up and Dispositions    · Return in about 3 months (around 6/17/2022) for diabetes, high blood pressure, high cholesterol.

## 2022-03-17 NOTE — PROGRESS NOTES
Hannah Kedar presents today for   Chief Complaint   Patient presents with    Diabetes    Hypertension    Bipolar    Cholesterol Problem       Is someone accompanying this pt? No     Is the patient using any DME equipment during OV? No     Depression Screening:  3 most recent PHQ Screens 12/14/2021   Little interest or pleasure in doing things Not at all   Feeling down, depressed, irritable, or hopeless Not at all   Total Score PHQ 2 0       Learning Assessment:  Learning Assessment 3/17/2022   PRIMARY LEARNER Patient   HIGHEST LEVEL OF EDUCATION - PRIMARY LEARNER  -   BARRIERS PRIMARY LEARNER -   [facility]    NEED -   LEARNER PREFERENCE PRIMARY DEMONSTRATION     -     -   ANSWERED BY patient    RELATIONSHIP SELF       Abuse Screening:  Abuse Screening Questionnaire 4/28/2021   Do you ever feel afraid of your partner? N   Are you in a relationship with someone who physically or mentally threatens you? N   Is it safe for you to go home? Y       Fall Screening  Fall Risk Assessment, last 12 mths 4/28/2021   Able to walk? Yes   Fall in past 12 months? 0   Do you feel unsteady? 0   Are you worried about falling 0       Generalized Anxiety  No flowsheet data found. Health Maintenance Due   Topic Date Due    COVID-19 Vaccine (1) Never done    Eye Exam Retinal or Dilated  Never done    DTaP/Tdap/Td series (1 - Tdap) Never done    Shingrix Vaccine Age 50> (1 of 2) Never done    Bone Densitometry (Dexa) Screening  Never done   . Health Maintenance reviewed and discussed and ordered per Provider. Vaccines Due   Screenings Due       Leela Gómez is updated on all     1. \"Have you been to the ER, urgent care clinic since your last visit? Hospitalized since your last visit? \" No    2. \"Have you seen or consulted any other health care providers outside of the [address] [place] [address] since your last visit? \" No     3.  For patients aged 39-70: Has the patient had a colonoscopy / FIT/ Cologuard? Yes - no Care Gap present     If the patient is female:    4. For patients aged 41-77: Has the patient had a mammogram within the past 2 years? Yes - no Care Gap present    5. For patients aged 21-65: Has the patient had a pap smear?  NA - based on age

## 2022-03-18 PROBLEM — F25.0 SCHIZOAFFECTIVE DISORDER, BIPOLAR TYPE (HCC): Status: ACTIVE | Noted: 2021-04-28

## 2022-03-18 LAB — HBA1C MFR BLD HPLC: 5.2 %

## 2022-03-19 PROBLEM — E11.8 CONTROLLED TYPE 2 DIABETES MELLITUS WITH COMPLICATION, WITHOUT LONG-TERM CURRENT USE OF INSULIN (HCC): Status: ACTIVE | Noted: 2021-04-28

## 2022-03-19 PROBLEM — E66.01 OBESITY, MORBID (HCC): Status: ACTIVE | Noted: 2017-11-27

## 2022-03-20 PROBLEM — A51.5: Status: ACTIVE | Noted: 2017-01-04

## 2022-04-12 ENCOUNTER — TELEPHONE (OUTPATIENT)
Dept: FAMILY MEDICINE CLINIC | Age: 72
End: 2022-04-12

## 2022-04-12 NOTE — TELEPHONE ENCOUNTER
Sanjuana Meadows is requesting a call back from nurse regarding getting verbal orders from Dr. Jason Lerner for pt to do occupational therapy.  Can be reached at [phone]

## 2022-04-15 ENCOUNTER — TRANSCRIBE ORDER (OUTPATIENT)
Dept: SCHEDULING | Age: 72
End: 2022-04-15

## 2022-04-15 DIAGNOSIS — Z12.31 VISIT FOR SCREENING MAMMOGRAM: Primary | ICD-10-CM

## 2022-05-04 ENCOUNTER — OFFICE VISIT (OUTPATIENT)
Dept: FAMILY MEDICINE CLINIC | Age: 72
End: 2022-05-04
Payer: MEDICARE

## 2022-05-04 DIAGNOSIS — R60.0 BILATERAL LEG EDEMA: ICD-10-CM

## 2022-05-04 DIAGNOSIS — Z02.79 MEDICAL CERTIFICATE ISSUANCE: Primary | ICD-10-CM
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
## 2022-05-04 PROCEDURE — 99213 OFFICE O/P EST LOW 20 MIN: CPT | Performed by: FAMILY MEDICINE

## 2022-05-04 PROCEDURE — G8427 DOCREV CUR MEDS BY ELIG CLIN: HCPCS | Performed by: FAMILY MEDICINE

## 2022-05-04 PROCEDURE — G8400 PT W/DXA NO RESULTS DOC: HCPCS | Performed by: FAMILY MEDICINE

## 2022-05-04 PROCEDURE — 3017F COLORECTAL CA SCREEN DOC REV: CPT | Performed by: FAMILY MEDICINE

## 2022-05-04 PROCEDURE — G8536 NO DOC ELDER MAL SCRN: HCPCS | Performed by: FAMILY MEDICINE

## 2022-05-04 PROCEDURE — G8417 CALC BMI ABV UP PARAM F/U: HCPCS | Performed by: FAMILY MEDICINE

## 2022-05-04 PROCEDURE — G8756 NO BP MEASURE DOC: HCPCS | Performed by: FAMILY MEDICINE

## 2022-05-04 PROCEDURE — G9899 SCRN MAM PERF RSLTS DOC: HCPCS | Performed by: FAMILY MEDICINE

## 2022-05-04 PROCEDURE — G9717 DOC PT DX DEP/BP F/U NT REQ: HCPCS | Performed by: FAMILY MEDICINE

## 2022-05-04 PROCEDURE — 1090F PRES/ABSN URINE INCON ASSESS: CPT | Performed by: FAMILY MEDICINE

## 2022-05-04 PROCEDURE — 1101F PT FALLS ASSESS-DOCD LE1/YR: CPT | Performed by: FAMILY MEDICINE

## 2022-05-04 RX ORDER — FUROSEMIDE 40 MG/1
TABLET ORAL
Qty: 90 TABLET | Refills: 1 | Status: SHIPPED | OUTPATIENT
Start: 2022-05-04

## 2022-05-04 NOTE — PROGRESS NOTES
Mireya Romero presents today for   Chief Complaint   Patient presents with   [facility] Fall      patient had  2 falls 4/7/22 seen at  [facility] for falls and was inpatient 4/7 -4/10  . has in home PT currently     Other     patient would like dmv parking decal due to decreased mobility     Other      patient daughter accompanies her has FMLA forms . Leela Gómez preferred language for health care discussion is english/other. Is someone accompanying this pt? Yes daughter     Is the patient using any DME equipment during OV? Walker     Depression Screening:  3 most recent PHQ Screens 3/17/2022   Little interest or pleasure in doing things Not at all   Feeling down, depressed, irritable, or hopeless Not at all   Total Score PHQ 2 0       Learning Assessment:  Learning Assessment 3/17/2022   PRIMARY LEARNER Patient   HIGHEST LEVEL OF EDUCATION - PRIMARY LEARNER  -   BARRIERS PRIMARY LEARNER -   [address] [facility]    NEED -   LEARNER PREFERENCE PRIMARY DEMONSTRATION     -     -   ANSWERED BY patient    RELATIONSHIP SELF       Abuse Screening:  Abuse Screening Questionnaire 3/17/2022   Do you ever feel afraid of your partner? N   Are you in a relationship with someone who physically or mentally threatens you? N   Is it safe for you to go home? Y       Generalized Anxiety  No flowsheet data found. Health Maintenance Due   Topic Date Due    COVID-19 Vaccine (1) Never done    Eye Exam Retinal or Dilated  Never done    DTaP/Tdap/Td series (1 - Tdap) Never done    Bone Densitometry (Dexa) Screening  Never done   . Health Maintenance reviewed and discussed and ordered per Provider. VACCINES DUE   SCREENINGS DUE       Leela Gómez is updated on all     1. \"Have you been to the ER, urgent care clinic since your last visit? Hospitalized since your last visit? \" Yes When: 4/7-4/10  Where: [facility]     2.  \"Have you seen or consulted any other health care providers outside of the [address] [place] [address] since your last visit? \" No     3. For patients aged 39-70: Has the patient had a colonoscopy / FIT/ Cologuard? Yes - no Care Gap present     If the patient is female:    4. For patients aged 41-77: Has the patient had a mammogram within the past 2 years? Yes - no Care Gap present    5. For patients aged 21-65: Has the patient had a pap smear?  Yes - no Care Gap present

## 2022-05-04 NOTE — PROGRESS NOTES
Chief Complaint   Patient presents with   [facility] Fall      patient had  2 falls 4/7/22 seen at  [facility] for falls and was inpatient 4/7 -4/10  . has in home PT currently     Other     patient would like dmv parking decal due to decreased mobility     Other      patient daughter accompanies her has FMLA forms . Subjective  Iris ROSS Rdo is a 81 y.o. female. HPI   Pt had 2 falls last month. She was admitted to the hospital.  She is doing well with therapy. However, her dtr needs fmla paperwork completed. She also requests a disabled parking placard as pt is now using a rollator walker. Review of Systems   Constitutional: Negative. HENT: Negative. Respiratory: Negative. Cardiovascular: Negative. All other systems reviewed and are negative. Objective  Physical Exam  Vitals and nursing note reviewed. Constitutional:       Appearance: Normal appearance. She is not ill-appearing. HENT:      Head: Normocephalic and atraumatic. Right Ear: External ear normal.      Left Ear: External ear normal.      Nose: Nose normal.      Mouth/Throat:      Mouth: Mucous membranes are moist.   Eyes:      Extraocular Movements: Extraocular movements intact. Conjunctiva/sclera: Conjunctivae normal.   Cardiovascular:      Rate and Rhythm: Normal rate and regular rhythm. Heart sounds: No murmur heard. No friction rub. No gallop. Pulmonary:      Effort: Pulmonary effort is normal.      Breath sounds: Normal breath sounds. No wheezing, rhonchi or rales. Musculoskeletal:         General: Normal range of motion. Cervical back: Normal range of motion. Skin:     General: Skin is warm and dry. Neurological:      Mental Status: She is alert and oriented to person, place, and time. Coordination: Coordination normal.   Psychiatric:         Mood and Affect: Mood normal.         Behavior: Behavior normal.         Thought Content:  Thought content normal.         Judgment: Judgment normal. Assessment & Plan  Diagnoses and all orders for this visit:    1. Medical certificate issuance  Forms completed with input from pt's dtr. Forms returned to pt's dtr. 2. Fall, initial encounter  Doing better now. Follow-up and Dispositions    · Return if symptoms worsen or fail to improve.        Ellis Cano MD

## 2022-05-04 NOTE — TELEPHONE ENCOUNTER
Received faxed refill request from [facility]. Last seen 3/17/22, next appt. 6/16/22.     Requested Prescriptions     Pending Prescriptions Disp Refills    furosemide (LASIX) 40 mg tablet 90 Tablet 0     Sig: TAKE 1 TABLET BY MOUTH ONCE DAILY

## 2022-05-05 ENCOUNTER — PATIENT MESSAGE (OUTPATIENT)
Dept: FAMILY MEDICINE CLINIC | Age: 72
End: 2022-05-05

## 2022-05-05 NOTE — TELEPHONE ENCOUNTER
----- Message from Rachael Villalbathers sent at 5/5/2022  9:49 AM EDT -----  Subject: Message to Provider    QUESTIONS  Information for Provider? patients daughter Nathalia Capps needs a note   for her since she brought her mother to her visit yesterday. fax? [phone].  ---------------------------------------------------------------------------  --------------  Mervin FINK  What is the best way for the office to contact you? OK to leave message on   voicemail  Preferred Call Back Phone Number? [phone]  ---------------------------------------------------------------------------  --------------  SCRIPT ANSWERS  Relationship to Patient? Other  Representative Name? daughter  Is the Representative on the appropriate HIPAA document in Epic?  Yes

## 2022-05-05 NOTE — TELEPHONE ENCOUNTER
Spoke to patient we do not fax work notes to unsecured faxs' unfortunately she will have to come by the office or get the note from [employer] .

## 2022-06-03 DIAGNOSIS — I10 ESSENTIAL HYPERTENSION: ICD-10-CM

## 2022-06-03 RX ORDER — LISINOPRIL 20 MG/1
20 TABLET ORAL DAILY
Qty: 30 TABLET | Refills: 0 | Status: SHIPPED | OUTPATIENT
Start: 2022-06-03 | End: 2022-07-06

## 2022-06-28 DIAGNOSIS — E78.2 MIXED HYPERLIPIDEMIA: ICD-10-CM

## 2022-06-28 NOTE — TELEPHONE ENCOUNTER
Received faxed refill request. (NO [facility] [place])    Last seen 5/4/22, next appt 8/4/22. Requested Prescriptions     Pending Prescriptions Disp Refills    pravastatin (PRAVACHOL) 10 mg tablet 30 Tablet 1     Sig: Take 1 Tablet by mouth nightly.

## 2022-06-29 RX ORDER — PRAVASTATIN SODIUM 10 MG/1
10 TABLET ORAL
Qty: 90 TABLET | Refills: 4 | Status: SHIPPED | OUTPATIENT
Start: 2022-06-29

## 2022-08-04 ENCOUNTER — OFFICE VISIT (OUTPATIENT)
Dept: FAMILY MEDICINE CLINIC | Age: 72
End: 2022-08-04
Payer: MEDICARE

## 2022-08-04 VITALS
OXYGEN SATURATION: 96 % | HEART RATE: 71 BPM | RESPIRATION RATE: 20 BRPM | TEMPERATURE: 97.8 F | HEIGHT: 60 IN | WEIGHT: 190.8 LBS | SYSTOLIC BLOOD PRESSURE: 109 MMHG | BODY MASS INDEX: 37.46 KG/M2 | DIASTOLIC BLOOD PRESSURE: 70 MMHG

## 2022-08-04 DIAGNOSIS — N18.30 STAGE 3 CHRONIC KIDNEY DISEASE, UNSPECIFIED WHETHER STAGE 3A OR 3B CKD (HCC): ICD-10-CM

## 2022-08-04 DIAGNOSIS — E11.69 HYPERLIPIDEMIA ASSOCIATED WITH TYPE 2 DIABETES MELLITUS (HCC): ICD-10-CM

## 2022-08-04 DIAGNOSIS — E78.5 HYPERLIPIDEMIA ASSOCIATED WITH TYPE 2 DIABETES MELLITUS (HCC): ICD-10-CM

## 2022-08-04 DIAGNOSIS — E11.8 CONTROLLED TYPE 2 DIABETES MELLITUS WITH COMPLICATION, WITHOUT LONG-TERM CURRENT USE OF INSULIN (HCC): Primary | ICD-10-CM

## 2022-08-04 DIAGNOSIS — N95.9 MENOPAUSAL AND POSTMENOPAUSAL DISORDER: ICD-10-CM

## 2022-08-04 DIAGNOSIS — I10 ESSENTIAL HYPERTENSION: ICD-10-CM

## 2022-08-04 PROCEDURE — G8427 DOCREV CUR MEDS BY ELIG CLIN: HCPCS | Performed by: FAMILY MEDICINE

## 2022-08-04 PROCEDURE — G9899 SCRN MAM PERF RSLTS DOC: HCPCS | Performed by: FAMILY MEDICINE

## 2022-08-04 PROCEDURE — 1101F PT FALLS ASSESS-DOCD LE1/YR: CPT | Performed by: FAMILY MEDICINE

## 2022-08-04 PROCEDURE — G8400 PT W/DXA NO RESULTS DOC: HCPCS | Performed by: FAMILY MEDICINE

## 2022-08-04 PROCEDURE — G9717 DOC PT DX DEP/BP F/U NT REQ: HCPCS | Performed by: FAMILY MEDICINE

## 2022-08-04 PROCEDURE — G8536 NO DOC ELDER MAL SCRN: HCPCS | Performed by: FAMILY MEDICINE

## 2022-08-04 PROCEDURE — 3044F HG A1C LEVEL LT 7.0%: CPT | Performed by: FAMILY MEDICINE

## 2022-08-04 PROCEDURE — G8752 SYS BP LESS 140: HCPCS | Performed by: FAMILY MEDICINE

## 2022-08-04 PROCEDURE — G8754 DIAS BP LESS 90: HCPCS | Performed by: FAMILY MEDICINE

## 2022-08-04 PROCEDURE — 1123F ACP DISCUSS/DSCN MKR DOCD: CPT | Performed by: FAMILY MEDICINE

## 2022-08-04 PROCEDURE — 1090F PRES/ABSN URINE INCON ASSESS: CPT | Performed by: FAMILY MEDICINE

## 2022-08-04 PROCEDURE — 3017F COLORECTAL CA SCREEN DOC REV: CPT | Performed by: FAMILY MEDICINE

## 2022-08-04 PROCEDURE — 99214 OFFICE O/P EST MOD 30 MIN: CPT | Performed by: FAMILY MEDICINE

## 2022-08-04 PROCEDURE — 2022F DILAT RTA XM EVC RTNOPTHY: CPT | Performed by: FAMILY MEDICINE

## 2022-08-04 PROCEDURE — G8417 CALC BMI ABV UP PARAM F/U: HCPCS | Performed by: FAMILY MEDICINE

## 2022-08-04 NOTE — PROGRESS NOTES
Vasu Rajput presents today for   Chief Complaint   Patient presents with    Diabetes    Hypertension    Cholesterol Problem     High chol       Is someone accompanying this pt? no    Is the patient using any DME equipment during OV? no    Depression Screening:  3 most recent PHQ Screens 8/4/2022   Little interest or pleasure in doing things Not at all   Feeling down, depressed, irritable, or hopeless Not at all   Total Score PHQ 2 0       Learning Assessment:  Learning Assessment 3/17/2022   PRIMARY LEARNER Patient   HIGHEST LEVEL OF EDUCATION - PRIMARY LEARNER  -   BARRIERS PRIMARY LEARNER -   [address] [facility]    NEED -   LEARNER PREFERENCE PRIMARY DEMONSTRATION     -     -   ANSWERED BY patient    RELATIONSHIP SELF       Abuse Screening:  Abuse Screening Questionnaire 3/17/2022   Do you ever feel afraid of your partner? N   Are you in a relationship with someone who physically or mentally threatens you? N   Is it safe for you to go home? Y       Fall Screening  Fall Risk Assessment, last 12 mths 3/17/2022   Able to walk? Yes   Fall in past 12 months? 0   Do you feel unsteady? 0   Are you worried about falling 0       Generalized Anxiety  No flowsheet data found. Health Maintenance Due   Topic Date Due    COVID-19 Vaccine (1) Never done    Eye Exam Retinal or Dilated  Never done    DTaP/Tdap/Td series (1 - Tdap) Never done    Bone Densitometry (Dexa) Screening  Never done    Medicare Yearly Exam  09/03/2022   . Health Maintenance reviewed and discussed and ordered per Provider. Coordination of Care  1. Have you been to the ER, urgent care clinic since your last visit? Hospitalized since your last visit? Yes, [facility]    2. Have you seen or consulted any other health care providers outside of the [address] [place] [address] since your last visit? Include any pap smears or colon screening. no      Advance Directive:  1.  Do you have an advance directive in place?  Patient Reply:no

## 2022-08-04 NOTE — PROGRESS NOTES
Chief Complaint   Patient presents with    Diabetes    Hypertension    Cholesterol Problem     High chol         HPI    Iris P Derek Morris is a 81 y.o. female presenting today for    3 months  follow up of dm, htn, hld. Patient did have a psychiatric admission in July. She had been off of her medication. She is doing much better now. Patient does not need medication refills today. New concerns today: None    Review of Systems   Constitutional: Negative. HENT: Negative. Respiratory: Negative. Cardiovascular: Negative. All other systems reviewed and are negative. Physical Exam  Vitals and nursing note reviewed. Constitutional:       Appearance: Normal appearance. She is not ill-appearing. HENT:      Head: Normocephalic and atraumatic. Right Ear: External ear normal.      Left Ear: External ear normal.      Nose: Nose normal.      Mouth/Throat:      Mouth: Mucous membranes are moist.   Eyes:      Extraocular Movements: Extraocular movements intact. Conjunctiva/sclera: Conjunctivae normal.   Cardiovascular:      Rate and Rhythm: Normal rate and regular rhythm. Heart sounds: No murmur heard. No friction rub. No gallop. Pulmonary:      Effort: Pulmonary effort is normal.      Breath sounds: Normal breath sounds. No wheezing, rhonchi or rales. Musculoskeletal:         General: Normal range of motion. Cervical back: Normal range of motion. Skin:     General: Skin is warm and dry. Neurological:      Mental Status: She is alert and oriented to person, place, and time. Coordination: Coordination normal.   Psychiatric:         Mood and Affect: Mood normal.         Behavior: Behavior normal.         Thought Content: Thought content normal.         Judgment: Judgment normal.       Diagnoses and all orders for this visit:    1.  Controlled type 2 diabetes mellitus with complication, without long-term current use of insulin ([facility])  -     METABOLIC PANEL, COMPREHENSIVE; Future  -     LIPID PANEL; Future  -     HEMOGLOBIN A1C WITH EAG; Future  -     MICROALBUMIN, UR, RAND W/ MICROALB/CREAT RATIO; Future    2. Essential hypertension  -     METABOLIC PANEL, COMPREHENSIVE; Future  -     LIPID PANEL; Future  Stable, cont pres tx plan. 3. Hyperlipidemia associated with type 2 diabetes mellitus (HCC)  -     METABOLIC PANEL, COMPREHENSIVE; Future  -     LIPID PANEL; Future    4. Stage 3 chronic kidney disease, unspecified whether stage 3a or 3b CKD (HCC)  -     METABOLIC PANEL, COMPREHENSIVE; Future  -     LIPID PANEL; Future    5. Menopausal and postmenopausal disorder  -     DEXA BONE DENSITY STUDY AXIAL; Future    Follow-up and Dispositions    Return in about 3 months (around 11/4/2022) for diabetes, high blood pressure, high cholesterol, chronic kidney disease, lab review.          Mwv asap

## 2022-09-08 ENCOUNTER — VIRTUAL VISIT (OUTPATIENT)
Dept: FAMILY MEDICINE CLINIC | Age: 72
End: 2022-09-08
Payer: MEDICARE

## 2022-09-08 DIAGNOSIS — Z00.00 MEDICARE ANNUAL WELLNESS VISIT, SUBSEQUENT: Primary | ICD-10-CM

## 2022-09-08 DIAGNOSIS — Z71.89 ACP (ADVANCE CARE PLANNING): ICD-10-CM

## 2022-09-08 PROCEDURE — 1101F PT FALLS ASSESS-DOCD LE1/YR: CPT | Performed by: FAMILY MEDICINE

## 2022-09-08 PROCEDURE — G9899 SCRN MAM PERF RSLTS DOC: HCPCS | Performed by: FAMILY MEDICINE

## 2022-09-08 PROCEDURE — G0439 PPPS, SUBSEQ VISIT: HCPCS | Performed by: FAMILY MEDICINE

## 2022-09-08 PROCEDURE — G8756 NO BP MEASURE DOC: HCPCS | Performed by: FAMILY MEDICINE

## 2022-09-08 PROCEDURE — 1123F ACP DISCUSS/DSCN MKR DOCD: CPT | Performed by: FAMILY MEDICINE

## 2022-09-08 PROCEDURE — G9717 DOC PT DX DEP/BP F/U NT REQ: HCPCS | Performed by: FAMILY MEDICINE

## 2022-09-08 PROCEDURE — 3017F COLORECTAL CA SCREEN DOC REV: CPT | Performed by: FAMILY MEDICINE

## 2022-09-08 PROCEDURE — G8427 DOCREV CUR MEDS BY ELIG CLIN: HCPCS | Performed by: FAMILY MEDICINE

## 2022-09-08 PROCEDURE — 99497 ADVNCD CARE PLAN 30 MIN: CPT | Performed by: FAMILY MEDICINE

## 2022-09-08 PROCEDURE — G8400 PT W/DXA NO RESULTS DOC: HCPCS | Performed by: FAMILY MEDICINE

## 2022-09-08 NOTE — PROGRESS NOTES
Advance Care Planning     Advance Care Planning (ACP) Physician/NP/PA Conversation      Date of Conversation: 9/8/2022  Conducted with: Patient with Decision Making Capacity    Healthcare Decision Maker:     Primary Decision Maker: Dallin Fernandez - Damian - [phone]  Click here to complete [address] including selection of the Healthcare Decision Maker Relationship (ie \"Primary\")      Care Preferences:    Hospitalization: \"If your health worsens and it becomes clear that your chance of recovery is unlikely, what would be your preference regarding hospitalization? \"  The patient is unsure. Ventilation: \"If you were unable to breathe on your own and your chance of recovery was unlikely, what would be your preference about the use of a ventilator (breathing machine) if it was available to you? \"   The patient would desire the use of a ventilator. Resuscitation: \"In the event your heart stopped as a result of an underlying serious health condition, would you want attempts to be made to restart your heart, or would you prefer a natural death? \"   Yes, attempt to resuscitate.       Conversation Outcomes / Follow-Up Plan:   ACP in process - information provided, considering goals and options  Reviewed DNR/DNI and patient elects Full Code (Attempt Resuscitation)     Length of Voluntary ACP Conversation in minutes:  16 minutes    Car Rios MD

## 2022-09-08 NOTE — PROGRESS NOTES
Baltazar Garcia presents today for   Chief Complaint   Patient presents with    Annual Wellness Visit     Medicare       Is someone accompanying this pt? no    Is the patient using any DME equipment during OV? no    Depression Screening:  3 most recent PHQ Screens 9/8/2022   Little interest or pleasure in doing things Not at all   Feeling down, depressed, irritable, or hopeless Not at all   Total Score PHQ 2 0       Learning Assessment:  Learning Assessment 3/17/2022   PRIMARY LEARNER Patient   HIGHEST LEVEL OF EDUCATION - PRIMARY LEARNER  -   BARRIERS PRIMARY LEARNER -   [address] [facility]    NEED -   LEARNER PREFERENCE PRIMARY DEMONSTRATION     -     -   ANSWERED BY patient    RELATIONSHIP SELF       Abuse Screening:  Abuse Screening Questionnaire 3/17/2022   Do you ever feel afraid of your partner? N   Are you in a relationship with someone who physically or mentally threatens you? N   Is it safe for you to go home? Y       Fall Screening  Fall Risk Assessment, last 12 mths 3/17/2022   Able to walk? Yes   Fall in past 12 months? 0   Do you feel unsteady? 0   Are you worried about falling 0       Generalized Anxiety  No flowsheet data found. Health Maintenance Due   Topic Date Due    Foot Exam Q1  Never done    Eye Exam Retinal or Dilated  Never done    DTaP/Tdap/Td series (1 - Tdap) Never done    Bone Densitometry (Dexa) Screening  Never done    COVID-19 Vaccine (4 - Booster for Moderna series) 06/09/2022    Flu Vaccine (1) 09/01/2022    Medicare Yearly Exam  09/03/2022   . Health Maintenance reviewed and discussed and ordered per Provider. Coordination of Care  1. Have you been to the ER, urgent care clinic since your last visit? Hospitalized since your last visit? no    2. Have you seen or consulted any other health care providers outside of the [address] [place] [address] since your last visit? Include any pap smears or colon screening.  no      Advance Directive:  discussed 8/4/22

## 2022-09-08 NOTE — PATIENT INSTRUCTIONS
Medicare Wellness Visit, Female     The best way to live healthy is to have a lifestyle where you eat a well-balanced diet, exercise regularly, limit alcohol use, and quit all forms of tobacco/nicotine, if applicable. Regular preventive services are another way to keep healthy. Preventive services (vaccines, screening tests, monitoring & exams) can help personalize your care plan, which helps you manage your own care. Screening tests can find health problems at the earliest stages, when they are easiest to treat. [facility] follows the current, evidence-based guidelines published by the [facility] ([facility]) when recommending preventive services for our patients. Because we follow these guidelines, sometimes recommendations change over time as research supports it. (For example, mammograms used to be recommended annually. Even though Medicare will still pay for an annual mammogram, the newer guidelines recommend a mammogram every two years for women of average risk). Of course, you and your doctor may decide to screen more often for some diseases, based on your risk and your co-morbidities (chronic disease you are already diagnosed with). Preventive services for you include:  - Medicare offers their members a free annual wellness visit, which is time for you and your primary care provider to discuss and plan for your preventive service needs. Take advantage of this benefit every year!  -All adults over the age of 72 should receive the recommended pneumonia vaccines. Current USPSTF guidelines recommend a series of two vaccines for the best pneumonia protection.   -All adults should have a flu vaccine yearly and a tetanus vaccine every 10 years.   -All adults age 48 and older should receive the shingles vaccines (series of two vaccines).       -All adults age 38-68 who are overweight should have a diabetes screening test once every three years.   -All adults born between [birth year] and 1963 should be screened once for Hepatitis C.  -Other screening tests and preventive services for persons with diabetes include: an eye exam to screen for diabetic retinopathy, a kidney function test, a foot exam, and stricter control over your cholesterol.   -Cardiovascular screening for adults with routine risk involves an electrocardiogram (ECG) at intervals determined by your doctor.   -Colorectal cancer screenings should be done for adults age 54-65 with no increased risk factors for colorectal cancer. There are a number of acceptable methods of screening for this type of cancer. Each test has its own benefits and drawbacks. Discuss with your doctor what is most appropriate for you during your annual wellness visit. The different tests include: colonoscopy (considered the best screening method), a fecal occult blood test, a fecal DNA test, and sigmoidoscopy.    -A bone mass density test is recommended when a woman turns 65 to screen for osteoporosis. This test is only recommended one time, as a screening. Some providers will use this same test as a disease monitoring tool if you already have osteoporosis. -Breast cancer screenings are recommended every other year for women of normal risk, age 54-69.  -Cervical cancer screenings for women over age 72 are only recommended with certain risk factors.      Here is a list of your current Health Maintenance items (your personalized list of preventive services) with a due date:  Health Maintenance Due   Topic Date Due    Diabetic Foot Care  Never done    Eye Exam  Never done    DTaP/Tdap/Td  (1 - Tdap) Never done    Bone Mineral Density   Never done    COVID-19 Vaccine (4 - Booster for Arpita Tino series) 06/09/2022    Yearly Flu Vaccine (1) 09/01/2022

## 2022-09-08 NOTE — PROGRESS NOTES
This is the Subsequent Medicare Annual Wellness Exam, performed 12 months or more after the Initial AWV or the last Subsequent AWV    I have reviewed the patient's medical history in detail and updated the computerized patient record. Assessment/Plan   Education and counseling provided:  End-of-Life planning (with patient's consent)  Influenza Vaccine  Screening for glaucoma upcoming  Dm foot exam completed; td and covid booster recommended    1. Medicare annual wellness visit, subsequent  2. ACP (advance care planning)     Depression Risk Factor Screening:     3 most recent PHQ Screens 2022   Little interest or pleasure in doing things Not at all   Feeling down, depressed, irritable, or hopeless Not at all   Total Score PHQ 2 0       Alcohol & Drug Abuse Risk Screen    Do you average more than 1 drink per night or more than 7 drinks a week:  No    On any one occasion in the past three months have you have had more than 3 drinks containing alcohol:  No          Functional Ability and Level of Safety:    Hearing: Hearing is good. Activities of Daily Living: The home contains: no safety equipment. Patient does total self care      Ambulation: with no difficulty     Fall Risk:  Fall Risk Assessment, last 12 mths 3/17/2022   Able to walk? Yes   Fall in past 12 months? 0   Do you feel unsteady? 0   Are you worried about falling 0      Abuse Screen:  Patient is not abused       Cognitive Screening    Has your family/caregiver stated any concerns about your memory: no    Cognitive Screening: .     Health Maintenance Due     Health Maintenance Due   Topic Date Due    Foot Exam Q1  Never done    Eye Exam Retinal or Dilated  Never done    DTaP/Tdap/Td series (1 - Tdap) Never done    Bone Densitometry (Dexa) Screening  Never done    COVID-19 Vaccine (4 - Booster for Moderna series) 2022    Flu Vaccine (1) 2022       Patient Care Team   Patient Care Team:  Krystle Vivas MD as PCP - General (Family Medicine)  Evin Taylor MD as PCP [facility]  Kina Traore MD (Pulmonary Disease)    History     Patient Active Problem List   Diagnosis Code    HTN (hypertension) I10    Hyperlipidemia E78.5    Family history of heart attack, premature, brother 46s Z80.55    Prediabetes R73.03    Family history of diabetes mellitus type II, mother Z80.1    Vitamin D deficiency E55.9    ACP (advance care planning) Z71.89    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    Obesity, morbid ([address] E66.01    Acute confusion R41.0    Altered mental status R41.82    Positive serology for syphilis A53.0    Schizoaffective disorder, bipolar type ([address] F25.0    Controlled type 2 diabetes mellitus with complication, without long-term current use of insulin ([address] E11.8    Chronic renal disease, stage III N18.30     Past Medical History:   Diagnosis Date    Asthma     Hypertension     Latent syphilis 3/22/2017    Osteoarthritis of ankle     Right ankle pain     posterior tibial tendon interstitial tear    Right foot pain     hindfoot arthrosis    Sleep disorder       Past Surgical History:   Procedure Laterality Date    HX  SECTION       Current Outpatient Medications   Medication Sig Dispense Refill    lisinopriL (PRINIVIL, ZESTRIL) 20 mg tablet Take 1 tablet by mouth once daily 30 Tablet 5    pravastatin (PRAVACHOL) 10 mg tablet Take 1 Tablet by mouth nightly. 90 Tablet 4    furosemide (LASIX) 40 mg tablet TAKE 1 TABLET BY MOUTH ONCE DAILY 90 Tablet 1    FLOVENT DISKUS 100 mcg/actuation dsdv INHALE 1 PUFF BY MOUTH TWICE DAILY 1 Each 2    Nebulizer Accessories kit To use with nebulizer (Patient not taking: Reported on 2021) 1 Kit 0    Nebulizer & Compressor machine 1 Each by Does Not Apply route daily as needed.  (Patient not taking: Reported on 2021) 1 Each 0    VENTOLIN HFA 90 mcg/actuation inhaler INHALE 1-2 PUFFS EVERY 4HRS AS NEEDED FOR WHEEZE (Patient not taking: Reported on 3/17/2022)  0 potassium chloride SR (K-TAB) 20 mEq tablet Take 1 Tab by mouth daily. (Patient not taking: Reported on 12/14/2021) 30 Tab 3     No Known Allergies    Family History   Problem Relation Age of Onset    Diabetes Mother     Hypertension Mother     Heart Disease Father     Hypertension Father     Gout Father     Heart Attack Brother     Schizophrenia Brother      Social History     Tobacco Use    Smoking status: Never    Smokeless tobacco: Never   Substance Use Topics    Alcohol use: No       Iris P Rico, was evaluated through a synchronous (real-time) audio-video encounter. The patient (or guardian if applicable) is aware that this is a billable service, which includes applicable co-pays. This Virtual Visit was conducted with patient's (and/or legal guardian's) consent. The visit was conducted pursuant to the emergency declaration under the [address] [place] [address], [address] [place] [address] authority and the [employer] and [employer] General Act. Patient identification was verified, and a caregiver was present when appropriate. The patient was located at: Home: [phone]  The provider was located at:  Facility (Appt Department): [address] [place] [address]      Signed By: Anastasia Villa MD     September 8, 2022

## 2022-10-27 ENCOUNTER — HOSPITAL ENCOUNTER (OUTPATIENT)
Dept: BONE DENSITY | Age: 72
Discharge: HOME | End: 2022-10-27
Attending: FAMILY MEDICINE
Payer: MEDICARE

## 2022-10-27 ENCOUNTER — HOSPITAL ENCOUNTER (OUTPATIENT)
Dept: LAB | Age: 72
Discharge: HOME | End: 2022-10-27

## 2022-10-27 ENCOUNTER — HOSPITAL ENCOUNTER (OUTPATIENT)
Dept: MAMMOGRAPHY | Age: 72
Discharge: HOME | End: 2022-10-27
Attending: FAMILY MEDICINE
Payer: MEDICARE

## 2022-10-27 DIAGNOSIS — Z12.31 VISIT FOR SCREENING MAMMOGRAM: ICD-10-CM

## 2022-10-27 DIAGNOSIS — N95.9 MENOPAUSAL AND POSTMENOPAUSAL DISORDER: ICD-10-CM

## 2022-10-27 LAB — SENTARA SPECIMEN COL,SENBCF: NORMAL

## 2022-10-27 PROCEDURE — 99001 SPECIMEN HANDLING PT-LAB: CPT

## 2022-10-27 PROCEDURE — 77063 BREAST TOMOSYNTHESIS BI: CPT

## 2022-10-27 PROCEDURE — 77080 DXA BONE DENSITY AXIAL: CPT

## 2022-10-28 LAB
A-G RATIO,AGRAT: 1.5 RATIO (ref 1.1–2.6)
ALBUMIN SERPL-MCNC: 4.2 G/DL (ref 3.5–5)
ALP SERPL-CCNC: 94 U/L (ref 40–120)
ALT SERPL-CCNC: 10 U/L (ref 5–40)
ANION GAP SERPL CALC-SCNC: 10 MMOL/L (ref 3–15)
AST SERPL W P-5'-P-CCNC: 17 U/L (ref 10–37)
AVG GLU, 10930: 125 MG/DL (ref 91–123)
BILIRUB SERPL-MCNC: 0.5 MG/DL (ref 0.2–1.2)
BUN SERPL-MCNC: 23 MG/DL (ref 6–22)
CALCIUM SERPL-MCNC: 9.2 MG/DL (ref 8.4–10.5)
CHLORIDE SERPL-SCNC: 111 MMOL/L (ref 98–110)
CHOLEST SERPL-MCNC: 215 MG/DL (ref 110–200)
CO2 SERPL-SCNC: 25 MMOL/L (ref 20–32)
CREAT SERPL-MCNC: 1.1 MG/DL (ref 0.8–1.4)
CREATININE, URINE: 119 MG/DL
GLOBULIN,GLOB: 2.8 G/DL (ref 2–4)
GLOMERULAR FILTRATION RATE: 51.8 ML/MIN/1.73 SQ.M.
GLUCOSE SERPL-MCNC: 82 MG/DL (ref 70–99)
HBA1C MFR BLD HPLC: 6 % (ref 4.8–5.6)
HDLC SERPL-MCNC: 2.8 MG/DL (ref 0–5)
HDLC SERPL-MCNC: 78 MG/DL
LDL/HDL RATIO,LDHD: 1.6
LDLC SERPL CALC-MCNC: 128 MG/DL (ref 50–99)
MICROALB/CREAT RATIO, 140286: 17 (ref 0–30)
MICROALBUMIN,URINE RANDOM 140054: 20.2 MG/L (ref 0.1–17)
NON-HDL CHOLESTEROL, 011976: 137 MG/DL
POTASSIUM SERPL-SCNC: 4.2 MMOL/L (ref 3.5–5.5)
PROT SERPL-MCNC: 7 G/DL (ref 6.2–8.1)
SODIUM SERPL-SCNC: 146 MMOL/L (ref 133–145)
TRIGL SERPL-MCNC: 45 MG/DL (ref 40–149)
VLDLC SERPL CALC-MCNC: 9 MG/DL (ref 8–30)

## 2022-11-17 ENCOUNTER — OFFICE VISIT (OUTPATIENT)
Dept: FAMILY MEDICINE CLINIC | Age: 72
End: 2022-11-17
Payer: MEDICARE

## 2022-11-17 VITALS
HEART RATE: 86 BPM | OXYGEN SATURATION: 98 % | TEMPERATURE: 97.4 F | DIASTOLIC BLOOD PRESSURE: 75 MMHG | SYSTOLIC BLOOD PRESSURE: 114 MMHG | RESPIRATION RATE: 16 BRPM | WEIGHT: 191.6 LBS | BODY MASS INDEX: 37.42 KG/M2

## 2022-11-17 DIAGNOSIS — I10 ESSENTIAL HYPERTENSION: ICD-10-CM

## 2022-11-17 DIAGNOSIS — N18.30 STAGE 3 CHRONIC KIDNEY DISEASE, UNSPECIFIED WHETHER STAGE 3A OR 3B CKD (HCC): ICD-10-CM

## 2022-11-17 DIAGNOSIS — E11.8 CONTROLLED TYPE 2 DIABETES MELLITUS WITH COMPLICATION, WITHOUT LONG-TERM CURRENT USE OF INSULIN (HCC): Primary | ICD-10-CM

## 2022-11-17 DIAGNOSIS — Z23 NEEDS FLU SHOT: ICD-10-CM

## 2022-11-17 DIAGNOSIS — Z71.85 VACCINE COUNSELING: ICD-10-CM

## 2022-11-17 DIAGNOSIS — E11.69 HYPERLIPIDEMIA ASSOCIATED WITH TYPE 2 DIABETES MELLITUS (HCC): ICD-10-CM

## 2022-11-17 DIAGNOSIS — E78.5 HYPERLIPIDEMIA ASSOCIATED WITH TYPE 2 DIABETES MELLITUS (HCC): ICD-10-CM

## 2022-11-17 PROCEDURE — 1123F ACP DISCUSS/DSCN MKR DOCD: CPT | Performed by: FAMILY MEDICINE

## 2022-11-17 PROCEDURE — G8417 CALC BMI ABV UP PARAM F/U: HCPCS | Performed by: FAMILY MEDICINE

## 2022-11-17 PROCEDURE — 3074F SYST BP LT 130 MM HG: CPT | Performed by: FAMILY MEDICINE

## 2022-11-17 PROCEDURE — G9717 DOC PT DX DEP/BP F/U NT REQ: HCPCS | Performed by: FAMILY MEDICINE

## 2022-11-17 PROCEDURE — G0008 ADMIN INFLUENZA VIRUS VAC: HCPCS | Performed by: FAMILY MEDICINE

## 2022-11-17 PROCEDURE — 3044F HG A1C LEVEL LT 7.0%: CPT | Performed by: FAMILY MEDICINE

## 2022-11-17 PROCEDURE — 90694 VACC AIIV4 NO PRSRV 0.5ML IM: CPT | Performed by: FAMILY MEDICINE

## 2022-11-17 PROCEDURE — 3078F DIAST BP <80 MM HG: CPT | Performed by: FAMILY MEDICINE

## 2022-11-17 PROCEDURE — 2022F DILAT RTA XM EVC RTNOPTHY: CPT | Performed by: FAMILY MEDICINE

## 2022-11-17 PROCEDURE — G9899 SCRN MAM PERF RSLTS DOC: HCPCS | Performed by: FAMILY MEDICINE

## 2022-11-17 PROCEDURE — 1090F PRES/ABSN URINE INCON ASSESS: CPT | Performed by: FAMILY MEDICINE

## 2022-11-17 PROCEDURE — G8754 DIAS BP LESS 90: HCPCS | Performed by: FAMILY MEDICINE

## 2022-11-17 PROCEDURE — 1101F PT FALLS ASSESS-DOCD LE1/YR: CPT | Performed by: FAMILY MEDICINE

## 2022-11-17 PROCEDURE — G8399 PT W/DXA RESULTS DOCUMENT: HCPCS | Performed by: FAMILY MEDICINE

## 2022-11-17 PROCEDURE — G8536 NO DOC ELDER MAL SCRN: HCPCS | Performed by: FAMILY MEDICINE

## 2022-11-17 PROCEDURE — G8752 SYS BP LESS 140: HCPCS | Performed by: FAMILY MEDICINE

## 2022-11-17 PROCEDURE — G8427 DOCREV CUR MEDS BY ELIG CLIN: HCPCS | Performed by: FAMILY MEDICINE

## 2022-11-17 PROCEDURE — 3017F COLORECTAL CA SCREEN DOC REV: CPT | Performed by: FAMILY MEDICINE

## 2022-11-17 PROCEDURE — 99214 OFFICE O/P EST MOD 30 MIN: CPT | Performed by: FAMILY MEDICINE

## 2022-11-17 NOTE — PATIENT INSTRUCTIONS
Vaccine Information Statement    Influenza (Flu) Vaccine (Inactivated or Recombinant): What You Need to Know    Many vaccine information statements are available in [place]ian and other languages. See www.immunize.org/vis. Hojas de información sobre vacunas están disponibles en español y en muchos otros idiomas. Visite www.immunize.org/vis. 1. Why get vaccinated? Influenza vaccine can prevent influenza (flu). Flu is a contagious disease that spreads around the [facility] every year, usually between October and May. Anyone can get the flu, but it is more dangerous for some people. Infants and young children, people 72 years and older, pregnant people, and people with certain health conditions or a weakened immune system are at greatest risk of flu complications. Pneumonia, bronchitis, sinus infections, and ear infections are examples of flu-related complications. If you have a medical condition, such as heart disease, cancer, or diabetes, flu can make it worse. Flu can cause fever and chills, sore throat, muscle aches, fatigue, cough, headache, and runny or stuffy nose. Some people may have vomiting and diarrhea, though this is more common in children than adults. In an average year, thousands of people in the [facility] die from flu, and many more are hospitalized. Flu vaccine prevents millions of illnesses and flu-related visits to the doctor each year. 2. Influenza vaccines     CDC recommends everyone 6 months and older get vaccinated every flu season. Children 6 months through 6years of age may need 2 doses during a single flu season. Everyone else needs only 1 dose each flu season. It takes about 2 weeks for protection to develop after vaccination. There are many flu viruses, and they are always changing. Each year a new flu vaccine is made to protect against the influenza viruses believed to be likely to cause disease in the upcoming flu season.  Even when the vaccine doesnt exactly match these viruses, it may still provide some protection. Influenza vaccine does not cause flu. Influenza vaccine may be given at the same time as other vaccines. 3. Talk with your health care provider    Tell your vaccination provider if the person getting the vaccine:  Has had an allergic reaction after a previous dose of influenza vaccine, or has any severe, life-threatening allergies   Has ever had Guillain-Barré Syndrome (also called GBS)    In some cases, your health care provider may decide to postpone influenza vaccination until a future visit. Influenza vaccine can be administered at any time during pregnancy. People who are or will be pregnant during influenza season should receive inactivated influenza vaccine. People with minor illnesses, such as a cold, may be vaccinated. People who are moderately or severely ill should usually wait until they recover before getting influenza vaccine. Your health care provider can give you more information. 4. Risks of a vaccine reaction    Soreness, redness, and swelling where the shot is given, fever, muscle aches, and headache can happen after influenza vaccination. There may be a very small increased risk of Guillain-Barré Syndrome (GBS) after inactivated influenza vaccine (the flu shot). [facility] children who get the flu shot along with pneumococcal vaccine (PCV13) and/or DTaP vaccine at the same time might be slightly more likely to have a seizure caused by fever. Tell your health care provider if a child who is getting flu vaccine has ever had a seizure. People sometimes faint after medical procedures, including vaccination. Tell your provider if you feel dizzy or have vision changes or ringing in the ears. As with any medicine, there is a very remote chance of a vaccine causing a severe allergic reaction, other serious injury, or death. 5. What if there is a serious problem?     An allergic reaction could occur after the vaccinated person leaves the clinic. If you see signs of a severe allergic reaction (hives, swelling of the face and throat, difficulty breathing, a fast heartbeat, dizziness, or weakness), call 9-1-1 and get the person to the nearest hospital.    For other signs that concern you, call your health care provider. Adverse reactions should be reported to the Vaccine Adverse Event Reporting System (VAERS). Your health care provider will usually file this report, or you can do it yourself. Visit the VAERS website at www.vaers. [facility].gov or call 7-511.154.2303. VAERS is only for reporting reactions, and VAERS staff members do not give medical advice. 6. The National Vaccine Injury Compensation Program    The [facility] Vaccine Injury Compensation Program (VICP) is a federal program that was created to compensate people who may have been injured by certain vaccines. Claims regarding alleged injury or death due to vaccination have a time limit for filing, which may be as short as two years. Visit the VICP website at www.[facility].gov/vaccinecompensation or call 9-443.602.6959 to learn about the program and about filing a claim. 7. How can I learn more? Ask your health care provider. Call your local or state health department. Visit the website of the Food and Drug Administration (FDA) for vaccine package inserts and additional information at www.fda.gov/vaccines-blood-biologics/vaccines. Contact the Centers for Disease Control and Prevention (CDC): Call 5-910.497.8318 (1-800-CDC-INFO) or  Visit CDCs influenza website at www.cdc.gov/flu. Vaccine Information Statement   Inactivated Influenza Vaccine   8/6/2021  [address] [place] [id number]   Department of Health and Human Services  Centers for Disease Control and Prevention    Office Use Only

## 2022-11-17 NOTE — PROGRESS NOTES
Cristian Griggs presents today for   Chief Complaint   Patient presents with    Diabetes    Hypertension    Cholesterol Problem    Chronic Kidney Disease    Labs       Vitals:    11/17/22 1147   BP: 114/75   Pulse: 86   Resp: 16   Temp: 97.4 °F (36.3 °C)   TempSrc: Oral   SpO2: 98%   Weight: 191 lb 9.6 oz (86.9 kg)        Is someone accompanying this pt? No     Is the patient using any DME equipment during OV? no    Depression Screening:  3 most recent PHQ Screens 9/8/2022   Little interest or pleasure in doing things Not at all   Feeling down, depressed, irritable, or hopeless Not at all   Total Score PHQ 2 0       Learning Assessment:  Learning Assessment 3/17/2022   PRIMARY LEARNER Patient   HIGHEST LEVEL OF EDUCATION - PRIMARY LEARNER  -   BARRIERS PRIMARY LEARNER -   [address] [place] [address]    NEED -   LEARNER PREFERENCE PRIMARY DEMONSTRATION     -     -   ANSWERED BY patient    RELATIONSHIP SELF       Abuse Screening:  Abuse Screening Questionnaire 3/17/2022   Do you ever feel afraid of your partner? N   Are you in a relationship with someone who physically or mentally threatens you? N   Is it safe for you to go home? Y       Fall Screening  Fall Risk Assessment, last 12 mths 3/17/2022   Able to walk? Yes   Fall in past 12 months? 0   Do you feel unsteady? 0   Are you worried about falling 0       Generalized Anxiety  No flowsheet data found. Health Maintenance Due   Topic Date Due    Foot Exam Q1  Never done    Eye Exam Retinal or Dilated  Never done    DTaP/Tdap/Td series (1 - Tdap) Never done    COVID-19 Vaccine (4 - Booster for Moderna series) 04/06/2022    Flu Vaccine (1) 08/01/2022   . Health Maintenance reviewed and discussed and ordered per Provider. Vaccines Due   Screenings Due       Leela Gómez is updated on all HM    1. \"Have you been to the ER, urgent care clinic since your last visit? Hospitalized since your last visit? \" No    2.  \"Have you seen or consulted any other health care providers outside of the [address] [place] [address] since your last visit? \" No     3. For patients aged 39-70: Has the patient had a colonoscopy / FIT/ Cologuard? Yes - no Care Gap present     If the patient is female:    4. For patients aged 41-77: Has the patient had a mammogram within the past 2 years? Yes - no Care Gap present    5. For patients aged 21-65: Has the patient had a pap smear?  NA - based on age

## 2022-11-17 NOTE — PROGRESS NOTES
Chief Complaint   Patient presents with    Diabetes    Hypertension    Cholesterol Problem    Chronic Kidney Disease    Labs         HPI    Carole Jc is a 81 y.o. female presenting today for 3 months  follow up of dm, htn, hld, ckd. Patient had labs on 10/27/22. Labs reviewed in detail with patient. Mammo and dexa that date were wnl. Patient does not need medication refills today. New concerns today: Pt would like to have a flu shot today. Review of Systems   Constitutional: Negative. HENT: Negative. Respiratory: Negative. Cardiovascular: Negative. All other systems reviewed and are negative. Physical Exam  Vitals and nursing note reviewed. Constitutional:       Appearance: Normal appearance. She is not ill-appearing. HENT:      Head: Normocephalic and atraumatic. Right Ear: External ear normal.      Left Ear: External ear normal.      Nose: Nose normal.      Mouth/Throat:      Mouth: Mucous membranes are moist.   Eyes:      Extraocular Movements: Extraocular movements intact. Conjunctiva/sclera: Conjunctivae normal.   Cardiovascular:      Rate and Rhythm: Normal rate and regular rhythm. Heart sounds: No murmur heard. No friction rub. No gallop. Pulmonary:      Effort: Pulmonary effort is normal.      Breath sounds: Normal breath sounds. No wheezing, rhonchi or rales. Musculoskeletal:         General: Normal range of motion. Cervical back: Normal range of motion. Skin:     General: Skin is warm and dry. Neurological:      Mental Status: She is alert and oriented to person, place, and time. Coordination: Coordination normal.   Psychiatric:         Mood and Affect: Mood normal.         Behavior: Behavior normal.         Thought Content: Thought content normal.         Judgment: Judgment normal.       Diagnoses and all orders for this visit:    1.  Controlled type 2 diabetes mellitus with complication, without long-term current use of insulin (HCC)  Stable, cont pres tx plan. 2. Essential hypertension  Stable, cont pres tx plan. 3. Hyperlipidemia associated with type 2 diabetes mellitus (HCC)  Stable, cont pres tx plan. 4. Stage 3 chronic kidney disease, unspecified whether stage 3a or 3b CKD (HCC)  Stable, cont pres tx plan. 5. Needs flu shot  -     INFLUENZA, FLUAD, (AGE 65 Y+), IM, PF, 0.5 ML    6. Vaccine counseling  Bivalent covid booster recommended. Follow-up and Dispositions    Return in about 3 months (around 2/17/2023) for diabetes, high blood pressure, high cholesterol, chronic kidney disease.

## 2023-02-20 RX ORDER — LISINOPRIL 20 MG/1
TABLET ORAL
Qty: 30 TABLET | Refills: 5 | Status: SHIPPED | OUTPATIENT
Start: 2023-02-20

## 2023-02-23 ENCOUNTER — OFFICE VISIT (OUTPATIENT)
Age: 73
End: 2023-02-23
Payer: MEDICARE

## 2023-02-23 VITALS
RESPIRATION RATE: 20 BRPM | HEIGHT: 60 IN | TEMPERATURE: 98.7 F | WEIGHT: 188.4 LBS | SYSTOLIC BLOOD PRESSURE: 111 MMHG | OXYGEN SATURATION: 97 % | BODY MASS INDEX: 36.99 KG/M2 | HEART RATE: 82 BPM | DIASTOLIC BLOOD PRESSURE: 73 MMHG

## 2023-02-23 DIAGNOSIS — M25.471 ANKLE EDEMA, BILATERAL: ICD-10-CM

## 2023-02-23 DIAGNOSIS — I83.893 VARICOSE VEINS OF BOTH LEGS WITH EDEMA: ICD-10-CM

## 2023-02-23 DIAGNOSIS — E11.69 TYPE 2 DIABETES MELLITUS WITH OTHER SPECIFIED COMPLICATION, WITHOUT LONG-TERM CURRENT USE OF INSULIN (HCC): Primary | ICD-10-CM

## 2023-02-23 DIAGNOSIS — E78.5 HYPERLIPIDEMIA ASSOCIATED WITH TYPE 2 DIABETES MELLITUS (HCC): ICD-10-CM

## 2023-02-23 DIAGNOSIS — M25.472 ANKLE EDEMA, BILATERAL: ICD-10-CM

## 2023-02-23 DIAGNOSIS — E11.69 HYPERLIPIDEMIA ASSOCIATED WITH TYPE 2 DIABETES MELLITUS (HCC): ICD-10-CM

## 2023-02-23 DIAGNOSIS — I10 ESSENTIAL (PRIMARY) HYPERTENSION: ICD-10-CM

## 2023-02-23 PROCEDURE — 3078F DIAST BP <80 MM HG: CPT | Performed by: FAMILY MEDICINE

## 2023-02-23 PROCEDURE — 99214 OFFICE O/P EST MOD 30 MIN: CPT | Performed by: FAMILY MEDICINE

## 2023-02-23 PROCEDURE — 3074F SYST BP LT 130 MM HG: CPT | Performed by: FAMILY MEDICINE

## 2023-02-23 PROCEDURE — 1123F ACP DISCUSS/DSCN MKR DOCD: CPT | Performed by: FAMILY MEDICINE

## 2023-02-23 SDOH — ECONOMIC STABILITY: HOUSING INSECURITY
IN THE LAST 12 MONTHS, WAS THERE A TIME WHEN YOU DID NOT HAVE A STEADY PLACE TO SLEEP OR SLEPT IN A SHELTER (INCLUDING NOW)?: NO

## 2023-02-23 SDOH — ECONOMIC STABILITY: FOOD INSECURITY: WITHIN THE PAST 12 MONTHS, YOU WORRIED THAT YOUR FOOD WOULD RUN OUT BEFORE YOU GOT MONEY TO BUY MORE.: NEVER TRUE

## 2023-02-23 SDOH — ECONOMIC STABILITY: INCOME INSECURITY: HOW HARD IS IT FOR YOU TO PAY FOR THE VERY BASICS LIKE FOOD, HOUSING, MEDICAL CARE, AND HEATING?: NOT VERY HARD

## 2023-02-23 SDOH — ECONOMIC STABILITY: FOOD INSECURITY: WITHIN THE PAST 12 MONTHS, THE FOOD YOU BOUGHT JUST DIDN'T LAST AND YOU DIDN'T HAVE MONEY TO GET MORE.: NEVER TRUE

## 2023-02-23 ASSESSMENT — PATIENT HEALTH QUESTIONNAIRE - PHQ9
1. LITTLE INTEREST OR PLEASURE IN DOING THINGS: 0
SUM OF ALL RESPONSES TO PHQ QUESTIONS 1-9: 0
2. FEELING DOWN, DEPRESSED OR HOPELESS: 0
SUM OF ALL RESPONSES TO PHQ QUESTIONS 1-9: 0
SUM OF ALL RESPONSES TO PHQ9 QUESTIONS 1 & 2: 0

## 2023-02-23 ASSESSMENT — ENCOUNTER SYMPTOMS: RESPIRATORY NEGATIVE: 1

## 2023-02-23 NOTE — PATIENT INSTRUCTIONS
FINANCIAL RESOURCES    [facility] Financial Assistance  What they offer: The [employer] Program helps uninsured patients who do not qualify for government-sponsored health insurance and cannot afford to pay for their medical care. Insured patient may also qualify for assistance based on family income, family size, and medical needs. Phone Number: [phone]  How to apply for the [employer] Program:            #    Option 1: To apply for financial assistance, a patient (or their family or other provider) should fill out the Financial Assistance Application. Copies of the Financial Assistance Application and the FAP may be obtained for free by calling the [facility] customer service department at [phone]. #    Option 2:  The Financial Assistance Application and policy may be obtained for free by downloading a copy from the [employer]:                     [employer]/patient-resources/financial-assistance                     #   Applications are available in several languages on the website    The [employer]. The Poshpacker. org  What they offer: The [employer] Program can assist you if youre uninsured and have been diagnosed with chronic, debilitating, or life-threatening disease. Phone Number: [phone]  Website: www. [employer]    UTILITIES    CommonHelp  What they offer: Partnership with the [place] [address]. Assist with   finding and applying for government funded programs and benefits. You can also update  your benefits or report changes through Studentboxp.   Website: AngelGlossyBox.0xdata  Phone Number: 2076HBJEOX ([phone])      N[address]a [address] they offer: Bushra De Leon is [employer] energy assistance program of last resort for                 anyone who faces financial hardships from unemployment or family crisis. o Phone Number: [phone]        o Address: [place] Ojeda, [address]        o Website: Foodyn/[place]/billing/billing-options/energyshare     Energy Assistance Programs (EAP) - Department of           o What they offer: EAP assists low income households in meeting their immediate home energy needs. o Phone Number: [phone] or contact your local department of         o Website: https://Courtview [employer]/     Good Rx   o What they offer: Good Rx tracks prescription drug prices and provides free drug coupons for discounts on medications. o Website: [employer]/    NeedyMeds   o What they offer: NeedyMeds offers free information on medications and healthcare cost savings programs including prescription assistance programs, coupons, and discount programs. o Website: PaymentBack.[employer] org/   o Helpline: 581.874.1082     RX Assist   o What they offer: Information about free and low-cost medicine programs. o Website: https://Ingrian Networks/    [employer] $4 Prescription Program   o What they offer: Prescription Program includes up to a 30-day supply for $4 and a 90-day supply for $10 of some covered generic drugs at commonly prescribed dosages   o Website: Lizbet      The Co-Pay Relief Program  What they offer: The Co-Pay Relief Program provides you with direct prescription co-payment assistance, if you are an insured [place] citizen and financially and medically qualify, including Medicare Part D beneficiaries who require assistance with their prescription drug co-payments. Phone toll-free: 4-583.741.1063  Website: www.[employer]K [employer]. org    The Partnership for Prescription Assistance   What they offer:   The Partnership for Prescription Assistance can help you if you lack Prescription coverage to get the medicines you need through the public or private program that is right for you. Phone toll-free: 1-909.769.2653  Website:  www. Getablex. [address]  o    What they offer: The [address] [place] [address] gives you and your family access to a free Prescription Drug Card program and savings of up to 75% at more than 50,000 national and The [employer]. Phone toll-free: 8-673.838.8429  Website: www.[employer]    additional resources? Call 211 or Find Help: https://www. findhelp.org/

## 2023-02-23 NOTE — PROGRESS NOTES
ASSESSMENT/PLAN:  1. Type 2 diabetes mellitus with other specified complication, without long-term current use of insulin (HCC)  -     Comprehensive Metabolic Panel; Future  -     Lipid Panel; Future  -     Hemoglobin A1C; Future  -     Microalbumin / Creatinine Urine Ratio; Future  2. Essential (primary) hypertension  -     Comprehensive Metabolic Panel; Future  -     Lipid Panel; Future  Stable, cont pres tx plan. 3. Hyperlipidemia associated with type 2 diabetes mellitus ([address]  -     Comprehensive Metabolic Panel; Future  -     Lipid Panel; Future  4. Ankle edema, bilateral  -     [facility] ([address])  5. Varicose veins of both legs with edema  -     [id number] [facility])      Return in about 3 months (around 5/23/2023) for DM, HTN, HLD, lab review, specialist eval.      SUBJECTIVE/OBJECTIVE:    Chief Complaint   Patient presents with    Diabetes    Hypertension    Cholesterol Problem    Chronic Kidney Disease         HPI    Ashley Phillips is a 72 y.o. female presenting today for    3 months  follow up of dm, htn, hld, ckd. Pt has been doing well. No recent labs. Patient does not need medication refills today. New concerns today: pt c/o swelling in her lower legs. She does not have pain. She does not usually wear compression hose. She did not take her lasix today due to this appt but her legs are not any worse than usual today. Review of Systems   Constitutional: Negative. HENT: Negative. Respiratory: Negative. Cardiovascular:  Positive for leg swelling. All other systems reviewed and are negative. Physical Exam  Vitals and nursing note reviewed. Constitutional:       General: She is not in acute distress. Appearance: Normal appearance. HENT:      Head: Normocephalic and atraumatic.       Right Ear: External ear normal.      Left Ear: External ear normal.      Nose: Nose normal.      Mouth/Throat:      Mouth: Mucous membranes are moist.   Eyes:      Extraocular Movements: Extraocular movements intact. Conjunctiva/sclera: Conjunctivae normal.   Cardiovascular:      Rate and Rhythm: Normal rate and regular rhythm. Heart sounds: No murmur heard. No friction rub. No gallop. Comments: B lower ext edema with skin thickening and mild hyperpigmentation. No redness. No tenderness to palpation. No calor. Pulmonary:      Effort: Pulmonary effort is normal.      Breath sounds: Normal breath sounds. No wheezing, rhonchi or rales. Musculoskeletal:         General: Normal range of motion. Cervical back: Normal range of motion. Right lower le+ Edema present. Left lower le+ Edema present. Skin:     General: Skin is warm and dry. Neurological:      Mental Status: She is alert and oriented to person, place, and time. Coordination: Coordination normal.   Psychiatric:         Mood and Affect: Mood normal.         Behavior: Behavior normal.         Thought Content: Thought content normal.         Judgment: Judgment normal.                 An electronic signature was used to authenticate this note.     --Serita Hammans, MD

## 2023-02-23 NOTE — PROGRESS NOTES
Roderick Warren presents today for   Chief Complaint   Patient presents with    Diabetes    Hypertension    Cholesterol Problem    Chronic Kidney Disease       Is someone accompanying this pt? no    Is the patient using any DME equipment during OV? no    Depression Screening:  PHQ-9 Questionaire 2/23/2023 9/8/2022 8/4/2022 3/17/2022 4/28/2021   Little interest or pleasure in doing things 0 0 0 0 0   Feeling down, depressed, or hopeless 0 0 0 0 0   PHQ-9 Total Score 0 0 0 0 0     PHQ Scores 2/23/2023 9/8/2022 8/4/2022 3/17/2022 12/14/2021 10/27/2021 9/2/2021   PHQ2 Score 0 0 0 0 - - -   PHQ2 Score - - - 0 0 0 0   PHQ9 Score 0 0 0 0 - - -       Learning Assessment:  Who is the primary learner? Patient    What is the preferred language for health care of the primary learner? ENGLISH    How does the primary learner prefer to learn new concepts? DEMONSTRATION    Answered By patient    Relationship to Learner SELF         Abuse Screening:  No flowsheet data found. Fall Screening  No flowsheet data found. Generalized Anxiety  No flowsheet data found. Health Maintenance Due   Topic Date Due    Diabetic foot exam  Never done    Diabetic retinal exam  Never done    DTaP/Tdap/Td vaccine (1 - Tdap) Never done    COVID-19 Vaccine (4 - Booster for Moderna series) 04/06/2022    GFR test (Diabetes, CKD 3-4, OR last GFR 15-59)  03/10/2023   . Health Maintenance reviewed and discussed and ordered per Provider. Coordination of Care  1. Have you been to the ER, urgent care clinic since your last visit? Hospitalized since your last visit? no    2. Have you seen or consulted any other health care providers outside of the [address] [place] [address] since your last visit? Include any pap smears or colon screening. no      Advance Directive:  1. Do you have an advance directive in place?  Patient Reply:no

## 2023-04-19 RX ORDER — FUROSEMIDE 40 MG/1
TABLET ORAL
Qty: 90 TABLET | Refills: 0 | Status: SHIPPED | OUTPATIENT
Start: 2023-04-19

## 2023-05-02 RX ORDER — FUROSEMIDE 40 MG/1
40 TABLET ORAL DAILY
Qty: 30 TABLET | Refills: 3 | OUTPATIENT
Start: 2023-05-02

## 2023-05-02 RX ORDER — PRAVASTATIN SODIUM 10 MG
10 TABLET ORAL DAILY
Qty: 30 TABLET | Refills: 3 | Status: SHIPPED | OUTPATIENT
Start: 2023-05-02

## 2023-05-02 NOTE — TELEPHONE ENCOUNTER
Spoke w/ pt and informed her she still had refills left on Lisinopril but the other rx request will be sent to Dr. Kimber Bills. Pt instructed to call pharmacy to request for refill of Lisinopril. Pt verbalized understanding.     Last appt: 2/23/23    Next appt: 5/24/23    Last lab: 10/27/22

## 2023-05-02 NOTE — TELEPHONE ENCOUNTER
MS. SCHULTZ HAD HER MEDICATIONS STOLEN AND NEEDS REFILLS FOR THE FOLLOWING:  Lisinopril 20 mg  [id number] mg  Furosemide 40 mg  Last seen 02/23/23  Next appt 05/24/23  Please advise

## 2023-05-04 ENCOUNTER — TELEPHONE (OUTPATIENT)
Facility: CLINIC | Age: 73
End: 2023-05-04

## 2023-05-08 ENCOUNTER — HOSPITAL ENCOUNTER (OUTPATIENT)
Facility: HOSPITAL | Age: 73
Discharge: HOME | End: 2023-05-11
Payer: MEDICARE

## 2023-05-08 LAB
ALBUMIN SERPL-MCNC: 3.5 G/DL (ref 3.4–5)
ALBUMIN/GLOB SERPL: 1 (ref 0.8–1.7)
ALP SERPL-CCNC: 112 U/L (ref 45–117)
ALT SERPL-CCNC: 16 U/L (ref 13–56)
ANION GAP SERPL CALC-SCNC: 6 MMOL/L (ref 3–18)
AST SERPL-CCNC: 16 U/L (ref 10–38)
BILIRUB SERPL-MCNC: 0.4 MG/DL (ref 0.2–1)
BUN SERPL-MCNC: 24 MG/DL (ref 7–18)
BUN/CREAT SERPL: 20 (ref 12–20)
CALCIUM SERPL-MCNC: 8.9 MG/DL (ref 8.5–10.1)
CHLORIDE SERPL-SCNC: 110 MMOL/L (ref 100–111)
CHOLEST SERPL-MCNC: 209 MG/DL
CO2 SERPL-SCNC: 25 MMOL/L (ref 21–32)
CREAT SERPL-MCNC: 1.2 MG/DL (ref 0.6–1.3)
CREAT UR-MCNC: 51 MG/DL (ref 30–125)
EST. AVERAGE GLUCOSE BLD GHB EST-MCNC: 128 MG/DL
GLOBULIN SER CALC-MCNC: 3.6 G/DL (ref 2–4)
GLUCOSE SERPL-MCNC: 99 MG/DL (ref 74–99)
HBA1C MFR BLD: 6.1 % (ref 4.2–5.6)
HDLC SERPL-MCNC: 82 MG/DL (ref 40–60)
HDLC SERPL: 2.5 (ref 0–5)
LDLC SERPL CALC-MCNC: 117 MG/DL (ref 0–100)
LIPID PANEL: ABNORMAL
MICROALBUMIN UR-MCNC: 1.41 MG/DL (ref 0–3)
MICROALBUMIN/CREAT UR-RTO: 28 MG/G (ref 0–30)
POTASSIUM SERPL-SCNC: 4.2 MMOL/L (ref 3.5–5.5)
PROT SERPL-MCNC: 7.1 G/DL (ref 6.4–8.2)
SODIUM SERPL-SCNC: 141 MMOL/L (ref 136–145)
TRIGL SERPL-MCNC: 50 MG/DL
VLDLC SERPL CALC-MCNC: 10 MG/DL

## 2023-05-08 PROCEDURE — 36415 COLL VENOUS BLD VENIPUNCTURE: CPT

## 2023-05-08 PROCEDURE — 80053 COMPREHEN METABOLIC PANEL: CPT

## 2023-05-08 PROCEDURE — 82043 UR ALBUMIN QUANTITATIVE: CPT

## 2023-05-08 PROCEDURE — 83036 HEMOGLOBIN GLYCOSYLATED A1C: CPT

## 2023-05-08 PROCEDURE — 80061 LIPID PANEL: CPT

## 2023-05-08 PROCEDURE — 82570 ASSAY OF URINE CREATININE: CPT

## 2023-05-23 ENCOUNTER — TELEPHONE (OUTPATIENT)
Facility: CLINIC | Age: 73
End: 2023-05-23

## 2023-05-23 ASSESSMENT — ENCOUNTER SYMPTOMS: RESPIRATORY NEGATIVE: 1

## 2023-05-23 NOTE — TELEPHONE ENCOUNTER
Vera Vigil with [facility]. Was reaching out to make sure you don't have any questions concerning this pt's upcoming appt. She stated that paperwork was faxed to you re:Ms. Pineda  Please call back to discuss, her direct number is [phone]  Please advise

## 2023-05-24 ENCOUNTER — OFFICE VISIT (OUTPATIENT)
Facility: CLINIC | Age: 73
End: 2023-05-24
Payer: MEDICARE

## 2023-05-24 VITALS
BODY MASS INDEX: 37.11 KG/M2 | DIASTOLIC BLOOD PRESSURE: 63 MMHG | SYSTOLIC BLOOD PRESSURE: 101 MMHG | WEIGHT: 190 LBS | TEMPERATURE: 97.7 F | HEART RATE: 77 BPM | OXYGEN SATURATION: 97 %

## 2023-05-24 DIAGNOSIS — E78.5 HYPERLIPIDEMIA ASSOCIATED WITH TYPE 2 DIABETES MELLITUS (HCC): ICD-10-CM

## 2023-05-24 DIAGNOSIS — N18.30 STAGE 3 CHRONIC KIDNEY DISEASE, UNSPECIFIED WHETHER STAGE 3A OR 3B CKD (HCC): ICD-10-CM

## 2023-05-24 DIAGNOSIS — I10 ESSENTIAL (PRIMARY) HYPERTENSION: ICD-10-CM

## 2023-05-24 DIAGNOSIS — E11.69 TYPE 2 DIABETES MELLITUS WITH OTHER SPECIFIED COMPLICATION, WITHOUT LONG-TERM CURRENT USE OF INSULIN (HCC): Primary | ICD-10-CM

## 2023-05-24 DIAGNOSIS — I83.893 VARICOSE VEINS OF BOTH LEGS WITH EDEMA: ICD-10-CM

## 2023-05-24 DIAGNOSIS — E11.69 HYPERLIPIDEMIA ASSOCIATED WITH TYPE 2 DIABETES MELLITUS (HCC): ICD-10-CM

## 2023-05-24 DIAGNOSIS — F25.0 SCHIZOAFFECTIVE DISORDER, BIPOLAR TYPE (HCC): ICD-10-CM

## 2023-05-24 DIAGNOSIS — E66.01 SEVERE OBESITY (BMI 35.0-39.9) WITH COMORBIDITY (HCC): ICD-10-CM

## 2023-05-24 PROCEDURE — G8399 PT W/DXA RESULTS DOCUMENT: HCPCS | Performed by: FAMILY MEDICINE

## 2023-05-24 PROCEDURE — 3074F SYST BP LT 130 MM HG: CPT | Performed by: FAMILY MEDICINE

## 2023-05-24 PROCEDURE — 3044F HG A1C LEVEL LT 7.0%: CPT | Performed by: FAMILY MEDICINE

## 2023-05-24 PROCEDURE — 3017F COLORECTAL CA SCREEN DOC REV: CPT | Performed by: FAMILY MEDICINE

## 2023-05-24 PROCEDURE — G8427 DOCREV CUR MEDS BY ELIG CLIN: HCPCS | Performed by: FAMILY MEDICINE

## 2023-05-24 PROCEDURE — 1123F ACP DISCUSS/DSCN MKR DOCD: CPT | Performed by: FAMILY MEDICINE

## 2023-05-24 PROCEDURE — 2022F DILAT RTA XM EVC RTNOPTHY: CPT | Performed by: FAMILY MEDICINE

## 2023-05-24 PROCEDURE — G8417 CALC BMI ABV UP PARAM F/U: HCPCS | Performed by: FAMILY MEDICINE

## 2023-05-24 PROCEDURE — 3078F DIAST BP <80 MM HG: CPT | Performed by: FAMILY MEDICINE

## 2023-05-24 PROCEDURE — 1090F PRES/ABSN URINE INCON ASSESS: CPT | Performed by: FAMILY MEDICINE

## 2023-05-24 PROCEDURE — 1036F TOBACCO NON-USER: CPT | Performed by: FAMILY MEDICINE

## 2023-05-24 PROCEDURE — 99214 OFFICE O/P EST MOD 30 MIN: CPT | Performed by: FAMILY MEDICINE

## 2023-05-24 ASSESSMENT — PATIENT HEALTH QUESTIONNAIRE - PHQ9
4. FEELING TIRED OR HAVING LITTLE ENERGY: 0
9. THOUGHTS THAT YOU WOULD BE BETTER OFF DEAD, OR OF HURTING YOURSELF: 0
SUM OF ALL RESPONSES TO PHQ QUESTIONS 1-9: 0
10. IF YOU CHECKED OFF ANY PROBLEMS, HOW DIFFICULT HAVE THESE PROBLEMS MADE IT FOR YOU TO DO YOUR WORK, TAKE CARE OF THINGS AT HOME, OR GET ALONG WITH OTHER PEOPLE: 0
SUM OF ALL RESPONSES TO PHQ9 QUESTIONS 1 & 2: 0
SUM OF ALL RESPONSES TO PHQ QUESTIONS 1-9: 0
2. FEELING DOWN, DEPRESSED OR HOPELESS: 0
7. TROUBLE CONCENTRATING ON THINGS, SUCH AS READING THE NEWSPAPER OR WATCHING TELEVISION: 0
SUM OF ALL RESPONSES TO PHQ QUESTIONS 1-9: 0
5. POOR APPETITE OR OVEREATING: 0
1. LITTLE INTEREST OR PLEASURE IN DOING THINGS: 0
3. TROUBLE FALLING OR STAYING ASLEEP: 0
6. FEELING BAD ABOUT YOURSELF - OR THAT YOU ARE A FAILURE OR HAVE LET YOURSELF OR YOUR FAMILY DOWN: 0
SUM OF ALL RESPONSES TO PHQ QUESTIONS 1-9: 0
8. MOVING OR SPEAKING SO SLOWLY THAT OTHER PEOPLE COULD HAVE NOTICED. OR THE OPPOSITE, BEING SO FIGETY OR RESTLESS THAT YOU HAVE BEEN MOVING AROUND A LOT MORE THAN USUAL: 0

## 2023-05-31 ENCOUNTER — TELEPHONE (OUTPATIENT)
Facility: CLINIC | Age: 73
End: 2023-05-31

## 2023-05-31 PROBLEM — E66.01 SEVERE OBESITY (BMI 35.0-39.9) WITH COMORBIDITY (HCC): Status: ACTIVE | Noted: 2023-05-31

## 2023-05-31 NOTE — TELEPHONE ENCOUNTER
Jenniffer Gave from the health dept call and would like to speak to the nurse concerning this patient. She said that she need to talk about the patient lab results.  She can be reached at [phone] Please advise

## 2023-06-23 NOTE — PROGRESS NOTES
HPI  Lucía Copeland is a 73 y.o. female  Chief Complaint   Patient presents with    Follow-up     Has not checked her blood glucose. Denies any dizziness or falls. Reports she continues to try to reduce her weight. She states she is walking. She denies any chest pains and or shortness of breath. She takes her medications as prescribed. She has no new concerns. Past Medical History  Past Medical History:   Diagnosis Date    Asthma     Hypertension     Latent syphilis 3/22/2017    Osteoarthritis of ankle     Right ankle pain     posterior tibial tendon interstitial tear    Right foot pain     hindfoot arthrosis    Sleep disorder        Surgical History  Past Surgical History:   Procedure Laterality Date    HX  SECTION          Medications  Current Outpatient Medications   Medication Sig Dispense Refill    pravastatin (PRAVACHOL) 10 mg tablet Take 1 tablet by mouth nightly 30 Tablet 0    lisinopriL (PRINIVIL, ZESTRIL) 20 mg tablet Take 1 tablet by mouth once daily 90 Tablet 1    furosemide (LASIX) 40 mg tablet TAKE 1 TABLET BY MOUTH ONCE DAILY 90 Tablet 0    FLOVENT DISKUS 100 mcg/actuation dsdv INHALE 1 PUFF BY MOUTH TWICE DAILY 1 Each 2    VENTOLIN HFA 90 mcg/actuation inhaler INHALE 1-2 PUFFS EVERY 4HRS AS NEEDED FOR WHEEZE  0    Nebulizer Accessories kit To use with nebulizer (Patient not taking: Reported on 2021) 1 Kit 0    Nebulizer & Compressor machine 1 Each by Does Not Apply route daily as needed. (Patient not taking: Reported on 2021) 1 Each 0    potassium chloride SR (K-TAB) 20 mEq tablet Take 1 Tab by mouth daily.  (Patient not taking: Reported on 2021) 30 Tab 3       Allergies  No Known Allergies    Family History  Family History   Problem Relation Age of Onset    Diabetes Mother     Hypertension Mother     Heart Disease Father     Hypertension Father     Gout Father     Heart Attack Brother     Schizophrenia Brother        Social History  Social History     Socioeconomic History    Marital status:      Spouse name: Not on file    Number of children: Not on file    Years of education: Not on file    Highest education level: Not on file   Occupational History    Not on file   Tobacco Use    Smoking status: Never Smoker    Smokeless tobacco: Never Used   Substance and Sexual Activity    Alcohol use: No    Drug use: No    Sexual activity: Never   Other Topics Concern    Not on file   Social History Narrative    Not on file     Social Determinants of Health     Financial Resource Strain:     Difficulty of Paying Living Expenses: Not on file   Food Insecurity:     Worried About Running Out of Food in the Last Year: Not on file    Pippa of Food in the Last Year: Not on file   Transportation Needs:     Lack of Transportation (Medical): Not on file    Lack of Transportation (Non-Medical):  Not on file   Physical Activity:     Days of Exercise per Week: Not on file    Minutes of Exercise per Session: Not on file   Stress:     Feeling of Stress : Not on file   Social Connections:     Frequency of Communication with Friends and Family: Not on file    Frequency of Social Gatherings with Friends and Family: Not on file    Attends Amish Services: Not on file    Active Member of [address] [place] [address] [place] or Organizations: Not on file    Attends Club or Organization Meetings: Not on file    Marital Status: Not on file   Intimate Partner Violence:     Fear of Current or Ex-Partner: Not on file    Emotionally Abused: Not on file    Physically Abused: Not on file    Sexually Abused: Not on file   Housing Stability:     Unable to Pay for Housing in the Last Year: Not on file    Number of [place] in the Last Year: Not on file    Unstable Housing in the Last Year: Not on file       Problem List  Patient Active Problem List   Diagnosis Code    HTN (hypertension) I10    Hyperlipidemia E78.5    Family history of heart attack, premature, brother 46s Z80.55    Prediabetes R78.1    Family history of diabetes mellitus type II, mother Z80.1    Vitamin D deficiency E55.9    ACP (advance care planning) Z70.80    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 HENT: Negative for congestion. Eyes: Negative for blurred vision. Respiratory: Negative for cough and shortness of breath. Cardiovascular: Negative for chest pain. Gastrointestinal: Negative for abdominal pain, blood in stool, nausea and vomiting. Genitourinary: Negative. Musculoskeletal: Negative for falls. Neurological: Negative for dizziness. Psychiatric/Behavioral: Negative for depression, substance abuse and suicidal ideas. Vital Signs  Vitals:    12/14/21 1059   BP: 105/70   Pulse: 65   Resp: 16   Temp: 97.7 °F (36.5 °C)   TempSrc: Oral   SpO2: 98%   Weight: 183 lb 9.6 oz (83.3 kg)   Height: 5' (1.524 m)   PainSc:   0 - No pain       Physical Exam  Physical Exam  HENT:      Nose: Nose normal.      Mouth/Throat:      Mouth: Mucous membranes are moist.   Eyes:      Pupils: Pupils are equal, round, and reactive to light. Cardiovascular:      Rate and Rhythm: Normal rate and regular rhythm. Pulmonary:      Effort: Pulmonary effort is normal.      Breath sounds: Normal breath sounds. Abdominal:      General: There is no distension. Tenderness: There is no abdominal tenderness. There is no right CVA tenderness, left CVA tenderness or guarding. Skin:     General: Skin is warm and dry. Neurological:      Mental Status: She is alert. Mental status is at baseline.    Psychiatric:         Mood and Affect: Mood normal.         Behavior: Behavior normal.         Diagnostics  Orders Placed This Encounter    MICROALBUMIN, UR, RAND W/ MICROALB/CREAT RATIO     Standing Status:   Future     Standing Expiration Date:   25/80/2126    METABOLIC PANEL, COMPREHENSIVE     Standing Status:   Future     Standing Expiration Date:   12/15/2022    LIPID PANEL     Standing Status:   Future     Standing Expiration Date:   12/15/2022    CBC WITH AUTOMATED DIFF     Standing Status:   Future     Standing Expiration Date:   12/15/2022    AMB POC HEMOGLOBIN A1C       Results  Results for orders placed or performed in visit on 12/14/21   AMB POC HEMOGLOBIN A1C   Result Value Ref Range    Hemoglobin A1c (POC) 5.6 %       Assessment and Plan  Diagnoses and all orders for this visit:    1. Controlled type 2 diabetes mellitus with complication, without long-term current use of insulin (HCC)  -     MICROALBUMIN, UR, RAND W/ MICROALB/CREAT RATIO; Future  -     METABOLIC PANEL, COMPREHENSIVE; Future  -     LIPID PANEL; Future  -     CBC WITH AUTOMATED DIFF; Future  -     AMB POC HEMOGLOBIN A1C    2. Essential hypertension  -     METABOLIC PANEL, COMPREHENSIVE; Future  -     LIPID PANEL; Future  -     CBC WITH AUTOMATED DIFF; Future    3. Schizoaffective disorder, bipolar type ([facility]    4. Bilateral leg edema  -     METABOLIC PANEL, COMPREHENSIVE; Future    5. Mixed hyperlipidemia      Labs prior to next appointment. After care summary printed and reviewed with patient. Plan reviewed with patient. Questions answered. Patient verbalized understanding of plan and is in agreement with plan. Patient to follow up in 3-4 months or earlier if symptoms worsen. Follow-up and Dispositions    · Return in about 4 months (around 4/14/2022), or if symptoms worsen or fail to improve, for rotuine care with PCP, diabetes, hypertension.        MARTHA Davis [facility]

## 2023-06-27 RX ORDER — LISINOPRIL 20 MG/1
TABLET ORAL
Qty: 90 TABLET | Refills: 0 | OUTPATIENT
Start: 2023-06-27

## 2023-07-03 DIAGNOSIS — I10 PRIMARY HYPERTENSION: Primary | ICD-10-CM

## 2023-07-03 RX ORDER — LISINOPRIL 20 MG/1
20 TABLET ORAL DAILY
Qty: 90 TABLET | Refills: 1 | Status: SHIPPED | OUTPATIENT
Start: 2023-07-03

## 2023-07-03 NOTE — TELEPHONE ENCOUNTER
Pt requesting refill on Lisinopril. Pt ran out of medications.     Next appt: 8/12/23  Last appt: 5/24/23  Last lab: 5/8/23

## 2023-08-20 ASSESSMENT — ENCOUNTER SYMPTOMS: RESPIRATORY NEGATIVE: 1

## 2023-08-20 NOTE — PROGRESS NOTES
ASSESSMENT/PLAN:  1. Type 2 diabetes mellitus with other specified complication, without long-term current use of insulin (HCC)  -     Comprehensive Metabolic Panel; Future  -     Lipid Panel; Future  -     Hemoglobin A1C; Future  -     Microalbumin / Creatinine Urine Ratio; Future    2. Primary hypertension  -     Comprehensive Metabolic Panel; Future  -     Lipid Panel; Future  Stable, cont pres tx plan. 3. Hyperlipidemia associated with type 2 diabetes mellitus ([address])  -     Comprehensive Metabolic Panel; Future  -     Lipid Panel; Future    4. Stage 3 chronic kidney disease, unspecified whether stage 3a or 3b CKD ([address])  -     Comprehensive Metabolic Panel; Future    5. Edema of both lower legs  -     [facility] ([address])  -     Comprehensive Metabolic Panel; Future        Return in about 3 months (around 11/21/2023) for DM, HTN, HLD, lab review. SUBJECTIVE/OBJECTIVE:    Chief Complaint   Patient presents with    Diabetes    Hypertension    Cholesterol Problem    Leg Swelling      Pt has LE Swelling and what appears to be a lump on the outside of her right leg that comes and goes . No known injury . Not painful to the touch . Some discoloration and  warmth but no redness . HPI    Artist Rodriguez is a 72 y.o. female presenting today for 3 months  follow up of dm, htn, hld. Patient does not need medication refills today. New concerns today: pt has intermittent swelling of b lower legs. She takes her fluid pill and elevates her legs. This does help the swelling go down. However, she has 2 nontender lumps in the R lower leg. Review of Systems   Constitutional: Negative. HENT: Negative. Respiratory: Negative. Cardiovascular: Negative. All other systems reviewed and are negative. Physical Exam  Vitals and nursing note reviewed. Constitutional:       General: She is not in acute distress.      Appearance:

## 2023-08-21 ENCOUNTER — HOSPITAL ENCOUNTER (OUTPATIENT)
Facility: HOSPITAL | Age: 73
Discharge: HOME | End: 2023-08-24
Payer: MEDICARE

## 2023-08-21 ENCOUNTER — OFFICE VISIT (OUTPATIENT)
Facility: CLINIC | Age: 73
End: 2023-08-21
Payer: MEDICARE

## 2023-08-21 VITALS
WEIGHT: 189.8 LBS | HEART RATE: 62 BPM | SYSTOLIC BLOOD PRESSURE: 108 MMHG | OXYGEN SATURATION: 98 % | BODY MASS INDEX: 37.07 KG/M2 | RESPIRATION RATE: 16 BRPM | DIASTOLIC BLOOD PRESSURE: 73 MMHG

## 2023-08-21 DIAGNOSIS — E11.69 HYPERLIPIDEMIA ASSOCIATED WITH TYPE 2 DIABETES MELLITUS (HCC): ICD-10-CM

## 2023-08-21 DIAGNOSIS — R60.0 EDEMA OF BOTH LOWER LEGS: ICD-10-CM

## 2023-08-21 DIAGNOSIS — E78.5 HYPERLIPIDEMIA ASSOCIATED WITH TYPE 2 DIABETES MELLITUS (HCC): ICD-10-CM

## 2023-08-21 DIAGNOSIS — E11.69 TYPE 2 DIABETES MELLITUS WITH OTHER SPECIFIED COMPLICATION, WITHOUT LONG-TERM CURRENT USE OF INSULIN (HCC): Primary | ICD-10-CM

## 2023-08-21 DIAGNOSIS — I10 PRIMARY HYPERTENSION: ICD-10-CM

## 2023-08-21 DIAGNOSIS — N18.30 STAGE 3 CHRONIC KIDNEY DISEASE, UNSPECIFIED WHETHER STAGE 3A OR 3B CKD (HCC): ICD-10-CM

## 2023-08-21 LAB
ALBUMIN SERPL-MCNC: 3.5 G/DL (ref 3.4–5)
ALBUMIN/GLOB SERPL: 0.9 (ref 0.8–1.7)
ALP SERPL-CCNC: 97 U/L (ref 45–117)
ALT SERPL-CCNC: 17 U/L (ref 13–56)
ANION GAP SERPL CALC-SCNC: 7 MMOL/L (ref 3–18)
AST SERPL-CCNC: 16 U/L (ref 10–38)
BILIRUB SERPL-MCNC: 1.1 MG/DL (ref 0.2–1)
BUN SERPL-MCNC: 19 MG/DL (ref 7–18)
BUN/CREAT SERPL: 16 (ref 12–20)
CALCIUM SERPL-MCNC: 9 MG/DL (ref 8.5–10.1)
CHLORIDE SERPL-SCNC: 108 MMOL/L (ref 100–111)
CHOLEST SERPL-MCNC: 224 MG/DL
CO2 SERPL-SCNC: 29 MMOL/L (ref 21–32)
CREAT SERPL-MCNC: 1.22 MG/DL (ref 0.6–1.3)
CREAT UR-MCNC: 128 MG/DL (ref 30–125)
EST. AVERAGE GLUCOSE BLD GHB EST-MCNC: 123 MG/DL
GLOBULIN SER CALC-MCNC: 3.7 G/DL (ref 2–4)
GLUCOSE SERPL-MCNC: 89 MG/DL (ref 74–99)
HBA1C MFR BLD: 5.9 % (ref 4.2–5.6)
HDLC SERPL-MCNC: 91 MG/DL (ref 40–60)
HDLC SERPL: 2.5 (ref 0–5)
LDLC SERPL CALC-MCNC: 123.2 MG/DL (ref 0–100)
LIPID PANEL: ABNORMAL
MICROALBUMIN UR-MCNC: 1.34 MG/DL (ref 0–3)
MICROALBUMIN/CREAT UR-RTO: 10 MG/G (ref 0–30)
POTASSIUM SERPL-SCNC: 3.9 MMOL/L (ref 3.5–5.5)
PROT SERPL-MCNC: 7.2 G/DL (ref 6.4–8.2)
SODIUM SERPL-SCNC: 144 MMOL/L (ref 136–145)
TRIGL SERPL-MCNC: 49 MG/DL
VLDLC SERPL CALC-MCNC: 9.8 MG/DL

## 2023-08-21 PROCEDURE — 80053 COMPREHEN METABOLIC PANEL: CPT

## 2023-08-21 PROCEDURE — G8417 CALC BMI ABV UP PARAM F/U: HCPCS | Performed by: FAMILY MEDICINE

## 2023-08-21 PROCEDURE — 99214 OFFICE O/P EST MOD 30 MIN: CPT | Performed by: FAMILY MEDICINE

## 2023-08-21 PROCEDURE — 3074F SYST BP LT 130 MM HG: CPT | Performed by: FAMILY MEDICINE

## 2023-08-21 PROCEDURE — 1123F ACP DISCUSS/DSCN MKR DOCD: CPT | Performed by: FAMILY MEDICINE

## 2023-08-21 PROCEDURE — 3078F DIAST BP <80 MM HG: CPT | Performed by: FAMILY MEDICINE

## 2023-08-21 PROCEDURE — 82570 ASSAY OF URINE CREATININE: CPT

## 2023-08-21 PROCEDURE — 36415 COLL VENOUS BLD VENIPUNCTURE: CPT

## 2023-08-21 PROCEDURE — G8427 DOCREV CUR MEDS BY ELIG CLIN: HCPCS | Performed by: FAMILY MEDICINE

## 2023-08-21 PROCEDURE — 3044F HG A1C LEVEL LT 7.0%: CPT | Performed by: FAMILY MEDICINE

## 2023-08-21 PROCEDURE — 1036F TOBACCO NON-USER: CPT | Performed by: FAMILY MEDICINE

## 2023-08-21 PROCEDURE — 2022F DILAT RTA XM EVC RTNOPTHY: CPT | Performed by: FAMILY MEDICINE

## 2023-08-21 PROCEDURE — G8399 PT W/DXA RESULTS DOCUMENT: HCPCS | Performed by: FAMILY MEDICINE

## 2023-08-21 PROCEDURE — 1090F PRES/ABSN URINE INCON ASSESS: CPT | Performed by: FAMILY MEDICINE

## 2023-08-21 PROCEDURE — 80061 LIPID PANEL: CPT

## 2023-08-21 PROCEDURE — 3017F COLORECTAL CA SCREEN DOC REV: CPT | Performed by: FAMILY MEDICINE

## 2023-08-21 PROCEDURE — 83036 HEMOGLOBIN GLYCOSYLATED A1C: CPT

## 2023-08-21 PROCEDURE — 82043 UR ALBUMIN QUANTITATIVE: CPT

## 2023-08-21 NOTE — PROGRESS NOTES
Chief Complaint   Patient presents with    Diabetes    Hypertension    Cholesterol Problem    Leg Swelling      Pt has LE Swelling and what appears to be a lump on the outside of her right leg that comes and goes . No known injury . Not painful to the touch . Some discoloration and  warmth but no redness . Vitals:    08/21/23 1116   BP: 108/73   Pulse: 62   Resp: 16   SpO2: 98%        Depression: Not at risk    PHQ-2 Score: 0        No flowsheet data found. Alyssa Albright \"Have you been to the ER, urgent care clinic since your last visit? Hospitalized since your last visit? \" No     2. \"Have you seen or consulted any other health care providers outside of the [address] [place] [address] since your last visit? \" Podiatry      3. For patients aged 43-73: Has the patient had a colonoscopy / FIT/ Cologuard? Yes     If the patient is female:    4. For patients aged 43-66: Has the patient had a mammogram within the past 2 years? Yes    5. For patients aged 21-65: Has the patient had a pap smear?  N/A

## 2023-09-07 RX ORDER — FUROSEMIDE 40 MG/1
TABLET ORAL
Qty: 90 TABLET | Refills: 3 | Status: SHIPPED | OUTPATIENT
Start: 2023-09-07

## 2023-09-08 DIAGNOSIS — I10 PRIMARY HYPERTENSION: ICD-10-CM

## 2023-09-08 RX ORDER — LISINOPRIL 20 MG/1
20 TABLET ORAL DAILY
Qty: 90 TABLET | Refills: 4 | Status: SHIPPED | OUTPATIENT
Start: 2023-09-08

## 2023-09-08 RX ORDER — PRAVASTATIN SODIUM 10 MG
10 TABLET ORAL DAILY
Qty: 90 TABLET | Refills: 4 | Status: SHIPPED | OUTPATIENT
Start: 2023-09-08

## 2023-09-08 NOTE — TELEPHONE ENCOUNTER
Pt wants a refill sen over to the pharm on file of pravastatin (PRAVACHOL) 10 MG tablet [[id number]]   lisinopril (PRINIVIL;ZESTRIL) 20 MG tablet [[id number]]

## 2023-09-11 ENCOUNTER — TELEMEDICINE (OUTPATIENT)
Facility: CLINIC | Age: 73
End: 2023-09-11
Payer: MEDICARE

## 2023-09-11 DIAGNOSIS — Z71.89 ACP (ADVANCE CARE PLANNING): ICD-10-CM

## 2023-09-11 DIAGNOSIS — Z00.00 MEDICARE ANNUAL WELLNESS VISIT, SUBSEQUENT: Primary | ICD-10-CM

## 2023-09-11 PROCEDURE — 1123F ACP DISCUSS/DSCN MKR DOCD: CPT | Performed by: FAMILY MEDICINE

## 2023-09-11 PROCEDURE — G0439 PPPS, SUBSEQ VISIT: HCPCS | Performed by: FAMILY MEDICINE

## 2023-09-11 PROCEDURE — 3017F COLORECTAL CA SCREEN DOC REV: CPT | Performed by: FAMILY MEDICINE

## 2023-09-11 PROCEDURE — 99497 ADVNCD CARE PLAN 30 MIN: CPT | Performed by: FAMILY MEDICINE

## 2023-09-11 ASSESSMENT — PATIENT HEALTH QUESTIONNAIRE - PHQ9
8. MOVING OR SPEAKING SO SLOWLY THAT OTHER PEOPLE COULD HAVE NOTICED. OR THE OPPOSITE, BEING SO FIGETY OR RESTLESS THAT YOU HAVE BEEN MOVING AROUND A LOT MORE THAN USUAL: 0
7. TROUBLE CONCENTRATING ON THINGS, SUCH AS READING THE NEWSPAPER OR WATCHING TELEVISION: 0
5. POOR APPETITE OR OVEREATING: 0
SUM OF ALL RESPONSES TO PHQ QUESTIONS 1-9: 0
3. TROUBLE FALLING OR STAYING ASLEEP: 0
4. FEELING TIRED OR HAVING LITTLE ENERGY: 0
10. IF YOU CHECKED OFF ANY PROBLEMS, HOW DIFFICULT HAVE THESE PROBLEMS MADE IT FOR YOU TO DO YOUR WORK, TAKE CARE OF THINGS AT HOME, OR GET ALONG WITH OTHER PEOPLE: 0
SUM OF ALL RESPONSES TO PHQ QUESTIONS 1-9: 0
9. THOUGHTS THAT YOU WOULD BE BETTER OFF DEAD, OR OF HURTING YOURSELF: 0
SUM OF ALL RESPONSES TO PHQ QUESTIONS 1-9: 0
1. LITTLE INTEREST OR PLEASURE IN DOING THINGS: 0
6. FEELING BAD ABOUT YOURSELF - OR THAT YOU ARE A FAILURE OR HAVE LET YOURSELF OR YOUR FAMILY DOWN: 0
SUM OF ALL RESPONSES TO PHQ9 QUESTIONS 1 & 2: 0
SUM OF ALL RESPONSES TO PHQ QUESTIONS 1-9: 0
2. FEELING DOWN, DEPRESSED OR HOPELESS: 0

## 2023-09-11 ASSESSMENT — LIFESTYLE VARIABLES
HOW MANY STANDARD DRINKS CONTAINING ALCOHOL DO YOU HAVE ON A TYPICAL DAY: PATIENT DOES NOT DRINK
HOW OFTEN DO YOU HAVE A DRINK CONTAINING ALCOHOL: NEVER

## 2023-09-11 NOTE — PATIENT INSTRUCTIONS
[phone] or search [employer] on [address] [place] [address]. You need [phone] mg of calcium and 6665-3153 IU of vitamin D per day. It is possible to meet your calcium requirement with diet alone, but a vitamin D supplement is usually necessary to meet this goal.  When exposed to the sun, use a sunscreen that protects against both UVA and UVB radiation with an SPF of 30 or greater. Reapply every 2 to 3 hours or after sweating, drying off with a towel, or swimming. Always wear a seat belt when traveling in a car. Always wear a helmet when riding a bicycle or motorcycle.

## 2023-09-11 NOTE — PROGRESS NOTES
Medicare Annual Wellness Visit    Kieran Villaseñor is here for Medicare AWV    Assessment & Plan   Medicare annual wellness visit, subsequent  ACP (advance care planning)    Recommendations for Preventive Services Due: see orders and patient instructions/AVS.  Recommended screening schedule for the next 5-10 years is provided to the patient in written form: see Patient Instructions/AVS.     No follow-ups on file. Subjective       Patient's complete Health Risk Assessment and screening values have been reviewed and are found in Flowsheets. The following problems were reviewed today and where indicated follow up appointments were made and/or referrals ordered. Positive Risk Factor Screenings with Interventions:       Cognitive: Words recalled: 1 Word Recalled           Total Score Interpretation: Abnormal Mini-Cog      Interventions:  Patient declines any further evaluation or treatment             Weight and Activity:  Physical Activity: Unknown (9/11/2023)    Exercise Vital Sign     Days of Exercise per Week: Not on file     Minutes of Exercise per Session: 30 min        Have you lost any weight without trying in the past 3 months?: No  There is no height or weight on file to calculate BMI. (!) Abnormal  Obesity Interventions:  Patient comments: pt is not able to exercise much as she is caring for her ill mother. She will try to exercise more. Dentist Screen:  Have you seen the dentist within the past year?: (!) No    Intervention:  Advised to schedule with their dentist     Vision Screen:  Do you have difficulty driving, watching TV, or doing any of your daily activities because of your eyesight?: No  Have you had an eye exam within the past year?: (!) No  No results found.     Interventions:   Patient encouraged to make appointment with their eye specialist      Advanced Directives:  Do you have a Living Will?: (!) No    Intervention:  see ACP note    Advance Care Planning   Advanced Care Planning:

## 2023-09-11 NOTE — PROGRESS NOTES
Advance Care Planning     Advance Care Planning (ACP) Physician/NP/PA Conversation    Date of Conversation: 9/11/2023  Conducted with: Patient with Decision Making Capacity    Healthcare Decision Maker:    Primary Decision Maker: Leydi Beebe - [phone]  Click here to complete Healthcare Decision Makers including selection of the Healthcare Decision Maker Relationship (ie \"Primary\"). Care Preferences:    Hospitalization: \"If your health worsens and it becomes clear that your chance of recovery is unlikely, what would be your preference regarding hospitalization? \"  The patient would prefer hospitalization. Ventilation: \"If you were unable to breath on your own and your chance of recovery was unlikely, what would be your preference about the use of a ventilator (breathing machine) if it was available to you? \"  The patient would desire the use of a ventilator. Resuscitation: \"In the event your heart stopped as a result of an underlying serious health condition, would you want attempts made to restart your heart, or would you prefer a natural death? \"  Yes, attempt to resuscitate.       Conversation Outcomes / Follow-Up Plan:  ACP in process - information provided, considering goals and options  Reviewed DNR/DNI and patient elects Full Code (Attempt Resuscitation)    Length of Voluntary ACP Conversation in minutes:  16 minutes    Marlys Cevallos MD

## 2023-09-19 NOTE — PROGRESS NOTES
Ashley Pineda    Chief Complaint   Patient presents with    Leg Swelling     Referred by PCP        History and Physical    Ashley ZENG Pastor Soulier is a 72 y.o. female with PMH significant for HTN, NIDDM, HLD, CKD, morbid obesity with BMI > 37.     she presents today for BLE edema. This is constant and only partially improves. She is on diuretics. Denies pain but endorses some paresthesias in her feet. she describes BLE edema. Patient is a poor historian and isn't providing me with much information. Previously seen in our office 5 years ago for lower extremity ulcerations which healed with compression. AT the time, she was also diagnosed with lymphedema      Associated symptoms:   [x] edema  [x] varicose veins  [] heaviness/aching  [] fatigue  [] Pain  [] H/o or current ulcer(s)      Patient   [] has   [x] has not   been wearing compression stockings. Patient states she had a hard time wearing compression      Relevant history:   [x] female gender  [x] Family history of edema: mother  [x] history of pregnancy:   [] history of DVT/PE  [] history of vein procedure    Pertinent edema history:    [] CHF/pulmonary HTN  [] PER/snoring  [] CKD  [] Pulmonary disease  [x] Obesity  - BMI > 37  [x] Activity level - low. Taking care care of her mother and spending a lot of time on her feet. The most recent PVL was reviewed and discussed with the patient. This shows minimal superficial venous insufficiency. Study is from 2018  Interpretation Summary    This is a summary report. The complete report is available in the patient's medical record. If you cannot access the medical record, please contact the sending organization for a detailed fax or copy. 1. No evidence of DVT bilaterally. 2. Deep venous reflux in the left common femoral vein. No other deep venous reflux identified bilaterally. 3. Superficial venous reflux in the left small saphenous vein in the mid and distal calf.    4. No evidence of reflux

## 2023-09-21 ENCOUNTER — OFFICE VISIT (OUTPATIENT)
Age: 73
End: 2023-09-21
Payer: MEDICARE

## 2023-09-21 VITALS
OXYGEN SATURATION: 98 % | DIASTOLIC BLOOD PRESSURE: 65 MMHG | BODY MASS INDEX: 34.95 KG/M2 | SYSTOLIC BLOOD PRESSURE: 110 MMHG | HEIGHT: 60 IN | HEART RATE: 67 BPM | WEIGHT: 178 LBS

## 2023-09-21 DIAGNOSIS — I89.0 LYMPHEDEMA: ICD-10-CM

## 2023-09-21 DIAGNOSIS — I83.893 VARICOSE VEINS OF BOTH LEGS WITH EDEMA: Primary | ICD-10-CM

## 2023-09-21 PROCEDURE — 1123F ACP DISCUSS/DSCN MKR DOCD: CPT | Performed by: SURGERY

## 2023-09-21 PROCEDURE — 3078F DIAST BP <80 MM HG: CPT | Performed by: SURGERY

## 2023-09-21 PROCEDURE — 3074F SYST BP LT 130 MM HG: CPT | Performed by: SURGERY

## 2023-09-21 PROCEDURE — 3017F COLORECTAL CA SCREEN DOC REV: CPT | Performed by: SURGERY

## 2023-09-21 PROCEDURE — G8399 PT W/DXA RESULTS DOCUMENT: HCPCS | Performed by: SURGERY

## 2023-09-21 PROCEDURE — 1036F TOBACCO NON-USER: CPT | Performed by: SURGERY

## 2023-09-21 PROCEDURE — 99203 OFFICE O/P NEW LOW 30 MIN: CPT | Performed by: SURGERY

## 2023-09-21 PROCEDURE — 1090F PRES/ABSN URINE INCON ASSESS: CPT | Performed by: SURGERY

## 2023-09-21 PROCEDURE — G8417 CALC BMI ABV UP PARAM F/U: HCPCS | Performed by: SURGERY

## 2023-09-21 PROCEDURE — G8427 DOCREV CUR MEDS BY ELIG CLIN: HCPCS | Performed by: SURGERY

## 2023-09-21 NOTE — PATIENT INSTRUCTIONS
Please start doing the following:     - elevate your legs at all times when sitting down  - elevate legs steeply 3x/day for 20 minutes \"toes above your nose\"  - Moisturize legs daily (Eucerin or Aquaphor for extremely dry skin)  - Walk 3x a day at least to your mailbox and back     In preparation for your venous ultrasound (reflux study), please do the following:   Do not wear compression stockings for 4 days prior to the ultrasound  Do not consume caffeine, including coffee, tea, soda or other caffeine containing soft drink, for 24 hours before the ultrasound  Please make sure that you are well hydrated when you present for the ultrasound. This is a complex and detailed study and will require about 1.5 hours of your time. You need to be able to lie on a bed for the majority of that time.

## 2023-09-22 PROBLEM — I83.893 VARICOSE VEINS OF BOTH LEGS WITH EDEMA: Status: ACTIVE | Noted: 2023-09-22

## 2023-09-22 PROBLEM — I89.0 LYMPHEDEMA: Status: ACTIVE | Noted: 2023-09-22

## 2023-10-25 ENCOUNTER — TRANSCRIBE ORDERS (OUTPATIENT)
Facility: HOSPITAL | Age: 73
End: 2023-10-25

## 2023-10-25 DIAGNOSIS — Z12.31 SCREENING MAMMOGRAM FOR HIGH-RISK PATIENT: Primary | ICD-10-CM

## 2023-11-13 ENCOUNTER — HOSPITAL ENCOUNTER (OUTPATIENT)
Facility: HOSPITAL | Age: 73
Discharge: HOME | End: 2023-11-16
Attending: FAMILY MEDICINE
Payer: MEDICARE

## 2023-11-13 VITALS — WEIGHT: 178 LBS | BODY MASS INDEX: 34.95 KG/M2 | HEIGHT: 60 IN

## 2023-11-13 DIAGNOSIS — Z12.31 SCREENING MAMMOGRAM FOR HIGH-RISK PATIENT: ICD-10-CM

## 2023-11-13 PROCEDURE — 77063 BREAST TOMOSYNTHESIS BI: CPT

## 2023-11-25 ASSESSMENT — ENCOUNTER SYMPTOMS: RESPIRATORY NEGATIVE: 1

## 2023-11-27 ENCOUNTER — OFFICE VISIT (OUTPATIENT)
Facility: CLINIC | Age: 73
End: 2023-11-27
Payer: MEDICARE

## 2023-11-27 VITALS
DIASTOLIC BLOOD PRESSURE: 72 MMHG | OXYGEN SATURATION: 98 % | WEIGHT: 190 LBS | HEART RATE: 80 BPM | SYSTOLIC BLOOD PRESSURE: 111 MMHG | RESPIRATION RATE: 16 BRPM | BODY MASS INDEX: 36.82 KG/M2

## 2023-11-27 DIAGNOSIS — N18.30 STAGE 3 CHRONIC KIDNEY DISEASE, UNSPECIFIED WHETHER STAGE 3A OR 3B CKD (HCC): ICD-10-CM

## 2023-11-27 DIAGNOSIS — E78.5 HYPERLIPIDEMIA ASSOCIATED WITH TYPE 2 DIABETES MELLITUS (HCC): ICD-10-CM

## 2023-11-27 DIAGNOSIS — Z23 NEEDS FLU SHOT: ICD-10-CM

## 2023-11-27 DIAGNOSIS — I83.93 VARICOSE VEINS OF BOTH LOWER EXTREMITIES, UNSPECIFIED WHETHER COMPLICATED: ICD-10-CM

## 2023-11-27 DIAGNOSIS — I89.0 LYMPHEDEMA OF BOTH LOWER EXTREMITIES: ICD-10-CM

## 2023-11-27 DIAGNOSIS — I10 ESSENTIAL (PRIMARY) HYPERTENSION: ICD-10-CM

## 2023-11-27 DIAGNOSIS — E11.69 HYPERLIPIDEMIA ASSOCIATED WITH TYPE 2 DIABETES MELLITUS (HCC): ICD-10-CM

## 2023-11-27 DIAGNOSIS — E11.69 TYPE 2 DIABETES MELLITUS WITH OTHER SPECIFIED COMPLICATION, WITHOUT LONG-TERM CURRENT USE OF INSULIN (HCC): Primary | ICD-10-CM

## 2023-11-27 PROCEDURE — 1123F ACP DISCUSS/DSCN MKR DOCD: CPT | Performed by: FAMILY MEDICINE

## 2023-11-27 PROCEDURE — 3078F DIAST BP <80 MM HG: CPT | Performed by: FAMILY MEDICINE

## 2023-11-27 PROCEDURE — 1036F TOBACCO NON-USER: CPT | Performed by: FAMILY MEDICINE

## 2023-11-27 PROCEDURE — 3074F SYST BP LT 130 MM HG: CPT | Performed by: FAMILY MEDICINE

## 2023-11-27 PROCEDURE — 2022F DILAT RTA XM EVC RTNOPTHY: CPT | Performed by: FAMILY MEDICINE

## 2023-11-27 PROCEDURE — G8417 CALC BMI ABV UP PARAM F/U: HCPCS | Performed by: FAMILY MEDICINE

## 2023-11-27 PROCEDURE — G0008 ADMIN INFLUENZA VIRUS VAC: HCPCS | Performed by: FAMILY MEDICINE

## 2023-11-27 PROCEDURE — G8427 DOCREV CUR MEDS BY ELIG CLIN: HCPCS | Performed by: FAMILY MEDICINE

## 2023-11-27 PROCEDURE — 90694 VACC AIIV4 NO PRSRV 0.5ML IM: CPT | Performed by: FAMILY MEDICINE

## 2023-11-27 PROCEDURE — 3044F HG A1C LEVEL LT 7.0%: CPT | Performed by: FAMILY MEDICINE

## 2023-11-27 PROCEDURE — G8484 FLU IMMUNIZE NO ADMIN: HCPCS | Performed by: FAMILY MEDICINE

## 2023-11-27 PROCEDURE — 3017F COLORECTAL CA SCREEN DOC REV: CPT | Performed by: FAMILY MEDICINE

## 2023-11-27 PROCEDURE — G8399 PT W/DXA RESULTS DOCUMENT: HCPCS | Performed by: FAMILY MEDICINE

## 2023-11-27 PROCEDURE — 1090F PRES/ABSN URINE INCON ASSESS: CPT | Performed by: FAMILY MEDICINE

## 2023-11-27 PROCEDURE — 99214 OFFICE O/P EST MOD 30 MIN: CPT | Performed by: FAMILY MEDICINE

## 2023-11-27 NOTE — PROGRESS NOTES
No chief complaint on file. There were no vitals filed for this visit. Depression: Not at risk (9/11/2023)    PHQ-2     PHQ-2 Score: 0             No data to display                     . \"Have you been to the ER, urgent care clinic since your last visit? Hospitalized since your last visit? \" No     2. \"Have you seen or consulted any other health care providers outside of the [address] [place] [address] since your last visit? \" No      3. For patients aged 43-73: Has the patient had a colonoscopy / FIT/ Cologuard? Yes     If the patient is female:    4. For patients aged 43-66: Has the patient had a mammogram within the past 2 years? Yes    5. For patients aged 21-65: Has the patient had a pap smear?  N/A
Obtained consent from patient. Per verbal order from Dr. Ezekiel Nixon of [facility] administered. Verified by me and Mendez Mariscal LPN that this is the correct immunization/injection. Patient observed for 15 minutes with no adverse reaction.
difficulty with use of compression hose. Pt was also dx with ble varicose veins. Again, compression and elevation were recommended. She is to f/u in 3 months. She is working on elevating her legs more. Patient does not need medication refills today. New concerns today: Pt would like to have a flu shot today. Review of Systems   Constitutional: Negative. HENT: Negative. Respiratory: Negative. Cardiovascular: Negative. All other systems reviewed and are negative. Physical Exam  Vitals and nursing note reviewed. Constitutional:       General: She is not in acute distress. Appearance: Normal appearance. HENT:      Head: Normocephalic and atraumatic. Right Ear: External ear normal.      Left Ear: External ear normal.      Nose: Nose normal.      Mouth/Throat:      Mouth: Mucous membranes are moist.   Eyes:      Extraocular Movements: Extraocular movements intact. Conjunctiva/sclera: Conjunctivae normal.   Cardiovascular:      Rate and Rhythm: Normal rate and regular rhythm. Heart sounds: No murmur heard. No friction rub. No gallop. Pulmonary:      Effort: Pulmonary effort is normal.      Breath sounds: Normal breath sounds. No wheezing, rhonchi or rales. Musculoskeletal:         General: Normal range of motion. Cervical back: Normal range of motion. Skin:     General: Skin is warm and dry. Neurological:      Mental Status: She is alert and oriented to person, place, and time. Coordination: Coordination normal.   Psychiatric:         Mood and Affect: Mood normal.         Behavior: Behavior normal.         Thought Content: Thought content normal.         Judgment: Judgment normal.                     An electronic signature was used to authenticate this note.     --Ayala Burnette MD

## 2023-11-30 PROBLEM — E11.9 DIABETES MELLITUS (HCC): Status: ACTIVE | Noted: 2021-04-28

## 2023-11-30 PROBLEM — I83.93 VARICOSE VEINS OF BOTH LOWER EXTREMITIES: Status: ACTIVE | Noted: 2023-11-30

## 2024-02-26 ENCOUNTER — HOSPITAL ENCOUNTER (OUTPATIENT)
Facility: HOSPITAL | Age: 74
Discharge: HOME | End: 2024-02-29
Payer: MEDICARE

## 2024-02-26 DIAGNOSIS — E11.69 HYPERLIPIDEMIA ASSOCIATED WITH TYPE 2 DIABETES MELLITUS (HCC): ICD-10-CM

## 2024-02-26 DIAGNOSIS — E78.5 HYPERLIPIDEMIA ASSOCIATED WITH TYPE 2 DIABETES MELLITUS (HCC): ICD-10-CM

## 2024-02-26 DIAGNOSIS — I10 ESSENTIAL (PRIMARY) HYPERTENSION: ICD-10-CM

## 2024-02-26 DIAGNOSIS — N18.30 STAGE 3 CHRONIC KIDNEY DISEASE, UNSPECIFIED WHETHER STAGE 3A OR 3B CKD (HCC): ICD-10-CM

## 2024-02-26 DIAGNOSIS — E11.69 TYPE 2 DIABETES MELLITUS WITH OTHER SPECIFIED COMPLICATION, WITHOUT LONG-TERM CURRENT USE OF INSULIN (HCC): ICD-10-CM

## 2024-02-26 LAB
ALBUMIN SERPL-MCNC: 3.4 G/DL (ref 3.4–5)
ALBUMIN/GLOB SERPL: 1 (ref 0.8–1.7)
ALP SERPL-CCNC: 129 U/L (ref 45–117)
ALT SERPL-CCNC: 16 U/L (ref 13–56)
ANION GAP SERPL CALC-SCNC: 4 MMOL/L (ref 3–18)
AST SERPL-CCNC: 15 U/L (ref 10–38)
BILIRUB SERPL-MCNC: 0.4 MG/DL (ref 0.2–1)
BUN SERPL-MCNC: 26 MG/DL (ref 7–18)
BUN/CREAT SERPL: 23 (ref 12–20)
CALCIUM SERPL-MCNC: 9.1 MG/DL (ref 8.5–10.1)
CHLORIDE SERPL-SCNC: 113 MMOL/L (ref 100–111)
CHOLEST SERPL-MCNC: 218 MG/DL
CO2 SERPL-SCNC: 25 MMOL/L (ref 21–32)
CREAT SERPL-MCNC: 1.13 MG/DL (ref 0.6–1.3)
CREAT UR-MCNC: 86 MG/DL (ref 30–125)
EST. AVERAGE GLUCOSE BLD GHB EST-MCNC: 117 MG/DL
GLOBULIN SER CALC-MCNC: 3.4 G/DL (ref 2–4)
GLUCOSE SERPL-MCNC: 86 MG/DL (ref 74–99)
HBA1C MFR BLD: 5.7 % (ref 4.2–5.6)
HDLC SERPL-MCNC: 97 MG/DL (ref 40–60)
HDLC SERPL: 2.2 (ref 0–5)
LDLC SERPL CALC-MCNC: 103.8 MG/DL (ref 0–100)
LIPID PANEL: ABNORMAL
MICROALBUMIN UR-MCNC: 0.73 MG/DL (ref 0–3)
MICROALBUMIN/CREAT UR-RTO: 8 MG/G (ref 0–30)
POTASSIUM SERPL-SCNC: 4.4 MMOL/L (ref 3.5–5.5)
PROT SERPL-MCNC: 6.8 G/DL (ref 6.4–8.2)
SODIUM SERPL-SCNC: 142 MMOL/L (ref 136–145)
TRIGL SERPL-MCNC: 86 MG/DL
VLDLC SERPL CALC-MCNC: 17.2 MG/DL

## 2024-02-26 PROCEDURE — 82570 ASSAY OF URINE CREATININE: CPT

## 2024-02-26 PROCEDURE — 82043 UR ALBUMIN QUANTITATIVE: CPT

## 2024-02-26 PROCEDURE — 80053 COMPREHEN METABOLIC PANEL: CPT

## 2024-02-26 PROCEDURE — 80061 LIPID PANEL: CPT

## 2024-02-26 PROCEDURE — 83036 HEMOGLOBIN GLYCOSYLATED A1C: CPT

## 2024-02-26 PROCEDURE — 36415 COLL VENOUS BLD VENIPUNCTURE: CPT

## 2024-03-11 ENCOUNTER — OFFICE VISIT (OUTPATIENT)
Facility: CLINIC | Age: 74
End: 2024-03-11
Payer: MEDICARE

## 2024-03-11 VITALS
BODY MASS INDEX: 38.87 KG/M2 | SYSTOLIC BLOOD PRESSURE: 121 MMHG | RESPIRATION RATE: 14 BRPM | HEIGHT: 60 IN | OXYGEN SATURATION: 97 % | HEART RATE: 87 BPM | WEIGHT: 198 LBS | DIASTOLIC BLOOD PRESSURE: 60 MMHG | TEMPERATURE: 98 F

## 2024-03-11 DIAGNOSIS — N18.30 STAGE 3 CHRONIC KIDNEY DISEASE, UNSPECIFIED WHETHER STAGE 3A OR 3B CKD (HCC): ICD-10-CM

## 2024-03-11 DIAGNOSIS — E66.01 OBESITY, MORBID (HCC): ICD-10-CM

## 2024-03-11 DIAGNOSIS — I10 ESSENTIAL (PRIMARY) HYPERTENSION: ICD-10-CM

## 2024-03-11 DIAGNOSIS — E11.69 HYPERLIPIDEMIA ASSOCIATED WITH TYPE 2 DIABETES MELLITUS (HCC): ICD-10-CM

## 2024-03-11 DIAGNOSIS — E11.69 TYPE 2 DIABETES MELLITUS WITH OTHER SPECIFIED COMPLICATION, WITHOUT LONG-TERM CURRENT USE OF INSULIN (HCC): Primary | ICD-10-CM

## 2024-03-11 DIAGNOSIS — F25.0 SCHIZOAFFECTIVE DISORDER, BIPOLAR TYPE (HCC): ICD-10-CM

## 2024-03-11 DIAGNOSIS — E78.5 HYPERLIPIDEMIA ASSOCIATED WITH TYPE 2 DIABETES MELLITUS (HCC): ICD-10-CM

## 2024-03-11 PROBLEM — E11.9 DIABETES MELLITUS (HCC): Chronic | Status: ACTIVE | Noted: 2021-04-28

## 2024-03-11 PROCEDURE — G8484 FLU IMMUNIZE NO ADMIN: HCPCS | Performed by: FAMILY MEDICINE

## 2024-03-11 PROCEDURE — 1090F PRES/ABSN URINE INCON ASSESS: CPT | Performed by: FAMILY MEDICINE

## 2024-03-11 PROCEDURE — 2022F DILAT RTA XM EVC RTNOPTHY: CPT | Performed by: FAMILY MEDICINE

## 2024-03-11 PROCEDURE — 3074F SYST BP LT 130 MM HG: CPT | Performed by: FAMILY MEDICINE

## 2024-03-11 PROCEDURE — 99214 OFFICE O/P EST MOD 30 MIN: CPT | Performed by: FAMILY MEDICINE

## 2024-03-11 PROCEDURE — 3044F HG A1C LEVEL LT 7.0%: CPT | Performed by: FAMILY MEDICINE

## 2024-03-11 PROCEDURE — 3078F DIAST BP <80 MM HG: CPT | Performed by: FAMILY MEDICINE

## 2024-03-11 PROCEDURE — 1123F ACP DISCUSS/DSCN MKR DOCD: CPT | Performed by: FAMILY MEDICINE

## 2024-03-11 PROCEDURE — G8427 DOCREV CUR MEDS BY ELIG CLIN: HCPCS | Performed by: FAMILY MEDICINE

## 2024-03-11 PROCEDURE — 1036F TOBACCO NON-USER: CPT | Performed by: FAMILY MEDICINE

## 2024-03-11 PROCEDURE — G8417 CALC BMI ABV UP PARAM F/U: HCPCS | Performed by: FAMILY MEDICINE

## 2024-03-11 PROCEDURE — G8399 PT W/DXA RESULTS DOCUMENT: HCPCS | Performed by: FAMILY MEDICINE

## 2024-03-11 PROCEDURE — 3017F COLORECTAL CA SCREEN DOC REV: CPT | Performed by: FAMILY MEDICINE

## 2024-03-11 SDOH — ECONOMIC STABILITY: INCOME INSECURITY: HOW HARD IS IT FOR YOU TO PAY FOR THE VERY BASICS LIKE FOOD, HOUSING, MEDICAL CARE, AND HEATING?: NOT HARD AT ALL

## 2024-03-11 SDOH — ECONOMIC STABILITY: FOOD INSECURITY: WITHIN THE PAST 12 MONTHS, YOU WORRIED THAT YOUR FOOD WOULD RUN OUT BEFORE YOU GOT MONEY TO BUY MORE.: NEVER TRUE

## 2024-03-11 SDOH — ECONOMIC STABILITY: FOOD INSECURITY: WITHIN THE PAST 12 MONTHS, THE FOOD YOU BOUGHT JUST DIDN'T LAST AND YOU DIDN'T HAVE MONEY TO GET MORE.: NEVER TRUE

## 2024-03-11 ASSESSMENT — PATIENT HEALTH QUESTIONNAIRE - PHQ9
SUM OF ALL RESPONSES TO PHQ QUESTIONS 1-9: 2
5. POOR APPETITE OR OVEREATING: 0
SUM OF ALL RESPONSES TO PHQ QUESTIONS 1-9: 2
9. THOUGHTS THAT YOU WOULD BE BETTER OFF DEAD, OR OF HURTING YOURSELF: 0
SUM OF ALL RESPONSES TO PHQ QUESTIONS 1-9: 2
1. LITTLE INTEREST OR PLEASURE IN DOING THINGS: 0
7. TROUBLE CONCENTRATING ON THINGS, SUCH AS READING THE NEWSPAPER OR WATCHING TELEVISION: 0
4. FEELING TIRED OR HAVING LITTLE ENERGY: 0
SUM OF ALL RESPONSES TO PHQ QUESTIONS 1-9: 2
2. FEELING DOWN, DEPRESSED OR HOPELESS: 0
10. IF YOU CHECKED OFF ANY PROBLEMS, HOW DIFFICULT HAVE THESE PROBLEMS MADE IT FOR YOU TO DO YOUR WORK, TAKE CARE OF THINGS AT HOME, OR GET ALONG WITH OTHER PEOPLE: 0
SUM OF ALL RESPONSES TO PHQ9 QUESTIONS 1 & 2: 0
8. MOVING OR SPEAKING SO SLOWLY THAT OTHER PEOPLE COULD HAVE NOTICED. OR THE OPPOSITE, BEING SO FIGETY OR RESTLESS THAT YOU HAVE BEEN MOVING AROUND A LOT MORE THAN USUAL: 0
3. TROUBLE FALLING OR STAYING ASLEEP: 2
6. FEELING BAD ABOUT YOURSELF - OR THAT YOU ARE A FAILURE OR HAVE LET YOURSELF OR YOUR FAMILY DOWN: 0

## 2024-03-11 NOTE — PROGRESS NOTES
Ashley Pineda presents today for   Chief Complaint   Patient presents with    Cholesterol Problem    Discuss Labs    Hypertension    Diabetes       Is someone accompanying this pt? No     Is the patient using any DME equipment during OV? No     Depression Screening:      3/11/2024    11:01 AM 9/11/2023    10:50 AM 5/24/2023    11:32 AM 2/23/2023    10:56 AM 9/8/2022    10:34 AM 8/4/2022    11:12 AM 3/17/2022    12:28 PM   PHQ-9 Questionaire   Little interest or pleasure in doing things 0 0 0 0 0 0 0   Feeling down, depressed, or hopeless 0 0 0 0 0 0 0   Trouble falling or staying asleep, or sleeping too much 2 0 0       Feeling tired or having little energy 0 0 0       Poor appetite or overeating 0 0 0       Feeling bad about yourself - or that you are a failure or have let yourself or your family down 0 0 0       Trouble concentrating on things, such as reading the newspaper or watching television 0 0 0       Moving or speaking so slowly that other people could have noticed. Or the opposite - being so fidgety or restless that you have been moving around a lot more than usual 0 0 0       Thoughts that you would be better off dead, or of hurting yourself in some way 0 0 0       PHQ-9 Total Score 2 0 0 0 0 0 0   If you checked off any problems, how difficult have these problems made it for you to do your work, take care of things at home, or get along with other people? 0 0 0            MAIKOL 7-Anxiety        No data to display                   Learning Assessment:  No question data found.     Fall Risk       No data to display                   Travel Screening:    Travel Screening       Question Response    Have you been in contact with someone who was sick? No / Unsure    Do you have any of the following new or worsening symptoms? None of these    Have you traveled internationally or domestically in the last month? No          Travel History   Travel since 02/11/24    No documented travel since 02/11/24

## 2024-03-11 NOTE — PROGRESS NOTES
ASSESSMENT/PLAN:  1. Type 2 diabetes mellitus with other specified complication, without long-term current use of insulin (HCC)  Assessment & Plan:   Well-controlled, continue current medications  Orders:  -     Comprehensive Metabolic Panel; Future  -     Lipid Panel; Future  -     Hemoglobin A1C; Future  -     Microalbumin / Creatinine Urine Ratio; Future  2. Hyperlipidemia associated with type 2 diabetes mellitus (HCC)  Assessment & Plan:   Well-controlled, continue current medications  Orders:  -     Comprehensive Metabolic Panel; Future  -     Lipid Panel; Future  3. Essential (primary) hypertension  Assessment & Plan:   Well-controlled, continue current medications  Orders:  -     Comprehensive Metabolic Panel; Future  -     Lipid Panel; Future  4. Stage 3 chronic kidney disease, unspecified whether stage 3a or 3b CKD (HCC)  Assessment & Plan:   Well-controlled, continue current treatment plan  Orders:  -     Comprehensive Metabolic Panel; Future  -     Lipid Panel; Future  5. Schizoaffective disorder, bipolar type (HCC)  Assessment & Plan:   Unclear control, not on meds.  Not seeing psych.  Pt does not seem inclined to do so at this time but is doing well currently.    6. Obesity, morbid (HCC)  Assessment & Plan:    Increase activity as tolerated.         Return in about 3 months (around 6/11/2024) for DM, HTN, HLD, lab review, CKD.      SUBJECTIVE/OBJECTIVE:    Chief Complaint   Patient presents with    Cholesterol Problem    Discuss Labs    Hypertension    Diabetes         HPI    Ashley Pineda is a 74 y.o. female presenting today for 3 months  follow up of dm, htn, hld, ckd.  Pt has been doing well overall.      Patient had labs on 2/26/24.  Labs reviewed in detail with patient       Patient does not need medication refills today.      New concerns today: none      Pt is not on risperdal.  She is not seeing the [facility] as advised after her psychiatric admission in March 2023.  She is adamant that \"ain't nothing

## 2024-03-11 NOTE — ASSESSMENT & PLAN NOTE
Unclear control, not on meds.  Not seeing psych.  Pt does not seem inclined to do so at this time but is doing well currently.

## 2024-06-11 ENCOUNTER — HOSPITAL ENCOUNTER (OUTPATIENT)
Facility: HOSPITAL | Age: 74
Discharge: HOME | End: 2024-06-14
Payer: MEDICARE

## 2024-06-11 DIAGNOSIS — E78.5 HYPERLIPIDEMIA ASSOCIATED WITH TYPE 2 DIABETES MELLITUS (HCC): ICD-10-CM

## 2024-06-11 DIAGNOSIS — I10 ESSENTIAL (PRIMARY) HYPERTENSION: ICD-10-CM

## 2024-06-11 DIAGNOSIS — E11.69 HYPERLIPIDEMIA ASSOCIATED WITH TYPE 2 DIABETES MELLITUS (HCC): ICD-10-CM

## 2024-06-11 DIAGNOSIS — E11.69 TYPE 2 DIABETES MELLITUS WITH OTHER SPECIFIED COMPLICATION, WITHOUT LONG-TERM CURRENT USE OF INSULIN (HCC): ICD-10-CM

## 2024-06-11 DIAGNOSIS — N18.30 STAGE 3 CHRONIC KIDNEY DISEASE, UNSPECIFIED WHETHER STAGE 3A OR 3B CKD (HCC): ICD-10-CM

## 2024-06-11 LAB
ALBUMIN SERPL-MCNC: 3.4 G/DL (ref 3.4–5)
ALBUMIN/GLOB SERPL: 0.9 (ref 0.8–1.7)
ALP SERPL-CCNC: 113 U/L (ref 45–117)
ALT SERPL-CCNC: 16 U/L (ref 13–56)
ANION GAP SERPL CALC-SCNC: 6 MMOL/L (ref 3–18)
AST SERPL-CCNC: 16 U/L (ref 10–38)
BILIRUB SERPL-MCNC: 0.7 MG/DL (ref 0.2–1)
BUN SERPL-MCNC: 22 MG/DL (ref 7–18)
BUN/CREAT SERPL: 21 (ref 12–20)
CALCIUM SERPL-MCNC: 8.9 MG/DL (ref 8.5–10.1)
CHLORIDE SERPL-SCNC: 112 MMOL/L (ref 100–111)
CHOLEST SERPL-MCNC: 221 MG/DL
CO2 SERPL-SCNC: 25 MMOL/L (ref 21–32)
CREAT SERPL-MCNC: 1.05 MG/DL (ref 0.6–1.3)
CREAT UR-MCNC: 98 MG/DL (ref 30–125)
EST. AVERAGE GLUCOSE BLD GHB EST-MCNC: 117 MG/DL
GLOBULIN SER CALC-MCNC: 3.7 G/DL (ref 2–4)
GLUCOSE SERPL-MCNC: 85 MG/DL (ref 74–99)
HBA1C MFR BLD: 5.7 % (ref 4.2–5.6)
HDLC SERPL-MCNC: 78 MG/DL (ref 40–60)
HDLC SERPL: 2.8 (ref 0–5)
LDLC SERPL CALC-MCNC: 135.4 MG/DL (ref 0–100)
LIPID PANEL: ABNORMAL
MICROALBUMIN UR-MCNC: 1 MG/DL (ref 0–3)
MICROALBUMIN/CREAT UR-RTO: 10 MG/G (ref 0–30)
POTASSIUM SERPL-SCNC: 3.9 MMOL/L (ref 3.5–5.5)
PROT SERPL-MCNC: 7.1 G/DL (ref 6.4–8.2)
SODIUM SERPL-SCNC: 143 MMOL/L (ref 136–145)
TRIGL SERPL-MCNC: 38 MG/DL
VLDLC SERPL CALC-MCNC: 7.6 MG/DL

## 2024-06-11 PROCEDURE — 82043 UR ALBUMIN QUANTITATIVE: CPT

## 2024-06-11 PROCEDURE — 80061 LIPID PANEL: CPT

## 2024-06-11 PROCEDURE — 83036 HEMOGLOBIN GLYCOSYLATED A1C: CPT

## 2024-06-11 PROCEDURE — 82570 ASSAY OF URINE CREATININE: CPT

## 2024-06-11 PROCEDURE — 36415 COLL VENOUS BLD VENIPUNCTURE: CPT

## 2024-06-11 PROCEDURE — 80053 COMPREHEN METABOLIC PANEL: CPT

## 2024-06-17 ENCOUNTER — OFFICE VISIT (OUTPATIENT)
Facility: CLINIC | Age: 74
End: 2024-06-17
Payer: MEDICARE

## 2024-06-17 VITALS
HEIGHT: 60 IN | DIASTOLIC BLOOD PRESSURE: 85 MMHG | OXYGEN SATURATION: 97 % | HEART RATE: 80 BPM | TEMPERATURE: 97.5 F | RESPIRATION RATE: 14 BRPM | WEIGHT: 199 LBS | BODY MASS INDEX: 39.07 KG/M2 | SYSTOLIC BLOOD PRESSURE: 127 MMHG

## 2024-06-17 DIAGNOSIS — E86.0 DEHYDRATION: ICD-10-CM

## 2024-06-17 DIAGNOSIS — E11.69 HYPERLIPIDEMIA ASSOCIATED WITH TYPE 2 DIABETES MELLITUS (HCC): ICD-10-CM

## 2024-06-17 DIAGNOSIS — E11.69 TYPE 2 DIABETES MELLITUS WITH OTHER SPECIFIED COMPLICATION, WITHOUT LONG-TERM CURRENT USE OF INSULIN (HCC): Primary | ICD-10-CM

## 2024-06-17 DIAGNOSIS — N18.30 STAGE 3 CHRONIC KIDNEY DISEASE, UNSPECIFIED WHETHER STAGE 3A OR 3B CKD (HCC): ICD-10-CM

## 2024-06-17 DIAGNOSIS — L03.012 CELLULITIS OF FINGER OF LEFT HAND: ICD-10-CM

## 2024-06-17 DIAGNOSIS — E78.5 HYPERLIPIDEMIA ASSOCIATED WITH TYPE 2 DIABETES MELLITUS (HCC): ICD-10-CM

## 2024-06-17 DIAGNOSIS — I10 ESSENTIAL (PRIMARY) HYPERTENSION: ICD-10-CM

## 2024-06-17 PROCEDURE — 3074F SYST BP LT 130 MM HG: CPT | Performed by: FAMILY MEDICINE

## 2024-06-17 PROCEDURE — 1123F ACP DISCUSS/DSCN MKR DOCD: CPT | Performed by: FAMILY MEDICINE

## 2024-06-17 PROCEDURE — 3044F HG A1C LEVEL LT 7.0%: CPT | Performed by: FAMILY MEDICINE

## 2024-06-17 PROCEDURE — 1036F TOBACCO NON-USER: CPT | Performed by: FAMILY MEDICINE

## 2024-06-17 PROCEDURE — G8427 DOCREV CUR MEDS BY ELIG CLIN: HCPCS | Performed by: FAMILY MEDICINE

## 2024-06-17 PROCEDURE — 3017F COLORECTAL CA SCREEN DOC REV: CPT | Performed by: FAMILY MEDICINE

## 2024-06-17 PROCEDURE — G8417 CALC BMI ABV UP PARAM F/U: HCPCS | Performed by: FAMILY MEDICINE

## 2024-06-17 PROCEDURE — G8399 PT W/DXA RESULTS DOCUMENT: HCPCS | Performed by: FAMILY MEDICINE

## 2024-06-17 PROCEDURE — 1090F PRES/ABSN URINE INCON ASSESS: CPT | Performed by: FAMILY MEDICINE

## 2024-06-17 PROCEDURE — 99214 OFFICE O/P EST MOD 30 MIN: CPT | Performed by: FAMILY MEDICINE

## 2024-06-17 PROCEDURE — 3079F DIAST BP 80-89 MM HG: CPT | Performed by: FAMILY MEDICINE

## 2024-06-17 PROCEDURE — 2022F DILAT RTA XM EVC RTNOPTHY: CPT | Performed by: FAMILY MEDICINE

## 2024-06-17 RX ORDER — ATORVASTATIN CALCIUM 20 MG/1
20 TABLET, FILM COATED ORAL DAILY
Qty: 90 TABLET | Refills: 1 | Status: SHIPPED | OUTPATIENT
Start: 2024-06-17

## 2024-06-17 SDOH — ECONOMIC STABILITY: FOOD INSECURITY: WITHIN THE PAST 12 MONTHS, THE FOOD YOU BOUGHT JUST DIDN'T LAST AND YOU DIDN'T HAVE MONEY TO GET MORE.: OFTEN TRUE

## 2024-06-17 SDOH — ECONOMIC STABILITY: FOOD INSECURITY: WITHIN THE PAST 12 MONTHS, YOU WORRIED THAT YOUR FOOD WOULD RUN OUT BEFORE YOU GOT MONEY TO BUY MORE.: OFTEN TRUE

## 2024-06-17 SDOH — ECONOMIC STABILITY: INCOME INSECURITY: HOW HARD IS IT FOR YOU TO PAY FOR THE VERY BASICS LIKE FOOD, HOUSING, MEDICAL CARE, AND HEATING?: NOT HARD AT ALL

## 2024-06-17 ASSESSMENT — ENCOUNTER SYMPTOMS: RESPIRATORY NEGATIVE: 1

## 2024-06-17 NOTE — PROGRESS NOTES
Aslhey Pineda presents today for   Chief Complaint   Patient presents with    Diabetes    Hypertension    Hyperlipidemia    Chronic Kidney Disease    Discuss Labs     6/11/24       Is someone accompanying this pt? no    Is the patient using any DME equipment during OV? no    Depression Screening:      3/11/2024    11:01 AM 9/11/2023    10:50 AM 5/24/2023    11:32 AM 2/23/2023    10:56 AM 9/8/2022    10:34 AM 8/4/2022    11:12 AM 3/17/2022    12:28 PM   PHQ-9 Questionaire   Little interest or pleasure in doing things 0 0 0 0 0 0 0   Feeling down, depressed, or hopeless 0 0 0 0 0 0 0   Trouble falling or staying asleep, or sleeping too much 2 0 0       Feeling tired or having little energy 0 0 0       Poor appetite or overeating 0 0 0       Feeling bad about yourself - or that you are a failure or have let yourself or your family down 0 0 0       Trouble concentrating on things, such as reading the newspaper or watching television 0 0 0       Moving or speaking so slowly that other people could have noticed. Or the opposite - being so fidgety or restless that you have been moving around a lot more than usual 0 0 0       Thoughts that you would be better off dead, or of hurting yourself in some way 0 0 0       PHQ-9 Total Score 2 0 0 0 0 0 0   If you checked off any problems, how difficult have these problems made it for you to do your work, take care of things at home, or get along with other people? 0 0 0            MAIKOL 7-Anxiety        No data to display                   Learning Assessment:  No question data found.     Fall Risk       No data to display                   Travel Screening:    Travel Screening     No screening recorded since 06/16/24 0000       Travel History   Travel since 05/17/24    No documented travel since 05/17/24            Health Maintenance reviewed and discussed and ordered per Provider.  Transportation Needs: Unknown (3/11/2024)    PRAPARE - Transportation     Lack of Transportation

## 2024-06-17 NOTE — PROGRESS NOTES
ASSESSMENT/PLAN:  1. Type 2 diabetes mellitus with other specified complication, without long-term current use of insulin (HCC)  Assessment & Plan:   Well-controlled, continue current medications  Orders:  -     Comprehensive Metabolic Panel; Future  -     Lipid Panel; Future  -     Hemoglobin A1C; Future  -     Microalbumin / Creatinine Urine Ratio; Future  2. Hyperlipidemia associated with type 2 diabetes mellitus (HCC)  Assessment & Plan:   Borderline controlled, changes made today: d/c pravastatin.  Trial atorva.  Recheck in 3 months.    Orders:  -     atorvastatin (LIPITOR) 20 MG tablet; Take 1 tablet by mouth daily, Disp-90 tablet, R-1Normal  -     Comprehensive Metabolic Panel; Future  -     Lipid Panel; Future  3. Essential (primary) hypertension  Assessment & Plan:   Well-controlled, continue current medications  Orders:  -     Comprehensive Metabolic Panel; Future  -     Lipid Panel; Future  4. Stage 3 chronic kidney disease, unspecified whether stage 3a or 3b CKD (HCC)  -     Comprehensive Metabolic Panel; Future  -     Lipid Panel; Future  5. Cellulitis of finger of left hand  Assessment & Plan:    Resolved.   6. Dehydration  Assessment & Plan:    Resolved.  Pt encouraged to maintain good hydration.        Return in about 3 months (around 9/17/2024) for DM, HTN, HLD, CKD, lab review.      SUBJECTIVE/OBJECTIVE:    Chief Complaint   Patient presents with    Diabetes    Hypertension    Hyperlipidemia    Chronic Kidney Disease    Discuss Labs     6/11/24         Diabetes    Hypertension    Hyperlipidemia        Ashley Pineda is a 74 y.o. female presenting today for    3 months  follow up of dm, htn, hld.    Patient had labs on 6/11/24.  Labs reviewed in detail with patient.  Pt states that she has been taking her statin.      [facility] for eval of L hand swelling.  She was dx with cellulitis.  She was treated with keflex x 7 days.  It did resolve and the hand is back to normal at this

## 2024-06-19 PROBLEM — L03.012 CELLULITIS OF FINGER OF LEFT HAND: Status: ACTIVE | Noted: 2024-06-19

## 2024-06-19 PROBLEM — E86.0 DEHYDRATION: Status: ACTIVE | Noted: 2024-06-19

## 2024-06-20 NOTE — ASSESSMENT & PLAN NOTE
Borderline controlled, changes made today: d/c pravastatin.  Trial atorva.  Recheck in 3 months.

## 2024-07-19 PROBLEM — E86.0 DEHYDRATION: Status: RESOLVED | Noted: 2024-06-19 | Resolved: 2024-07-19

## 2024-08-29 ENCOUNTER — HOSPITAL ENCOUNTER (OUTPATIENT)
Facility: HOSPITAL | Age: 74
Discharge: HOME | End: 2024-08-29
Payer: MEDICARE

## 2024-08-29 DIAGNOSIS — E11.69 HYPERLIPIDEMIA ASSOCIATED WITH TYPE 2 DIABETES MELLITUS (HCC): ICD-10-CM

## 2024-08-29 DIAGNOSIS — E11.69 TYPE 2 DIABETES MELLITUS WITH OTHER SPECIFIED COMPLICATION, WITHOUT LONG-TERM CURRENT USE OF INSULIN (HCC): ICD-10-CM

## 2024-08-29 DIAGNOSIS — E78.5 HYPERLIPIDEMIA ASSOCIATED WITH TYPE 2 DIABETES MELLITUS (HCC): ICD-10-CM

## 2024-08-29 DIAGNOSIS — I10 ESSENTIAL (PRIMARY) HYPERTENSION: ICD-10-CM

## 2024-08-29 DIAGNOSIS — N18.30 STAGE 3 CHRONIC KIDNEY DISEASE, UNSPECIFIED WHETHER STAGE 3A OR 3B CKD (HCC): ICD-10-CM

## 2024-08-29 LAB
ALBUMIN SERPL-MCNC: 3.7 G/DL (ref 3.4–5)
ALBUMIN/GLOB SERPL: 1.1 (ref 0.8–1.7)
ALP SERPL-CCNC: 114 U/L (ref 45–117)
ALT SERPL-CCNC: 18 U/L (ref 13–56)
ANION GAP SERPL CALC-SCNC: 8 MMOL/L (ref 3–18)
AST SERPL-CCNC: 18 U/L (ref 10–38)
BILIRUB SERPL-MCNC: 0.5 MG/DL (ref 0.2–1)
BUN SERPL-MCNC: 23 MG/DL (ref 7–18)
BUN/CREAT SERPL: 19 (ref 12–20)
CALCIUM SERPL-MCNC: 8.9 MG/DL (ref 8.5–10.1)
CHLORIDE SERPL-SCNC: 108 MMOL/L (ref 100–111)
CHOLEST SERPL-MCNC: 175 MG/DL
CO2 SERPL-SCNC: 25 MMOL/L (ref 21–32)
CREAT SERPL-MCNC: 1.18 MG/DL (ref 0.6–1.3)
CREAT UR-MCNC: 69 MG/DL (ref 30–125)
EST. AVERAGE GLUCOSE BLD GHB EST-MCNC: 128 MG/DL
GLOBULIN SER CALC-MCNC: 3.5 G/DL (ref 2–4)
GLUCOSE SERPL-MCNC: 82 MG/DL (ref 74–99)
HBA1C MFR BLD: 6.1 % (ref 4.2–5.6)
HDLC SERPL-MCNC: 97 MG/DL (ref 40–60)
HDLC SERPL: 1.8 (ref 0–5)
LDLC SERPL CALC-MCNC: 69.4 MG/DL (ref 0–100)
LIPID PANEL: ABNORMAL
MICROALBUMIN UR-MCNC: 0.56 MG/DL (ref 0–3)
MICROALBUMIN/CREAT UR-RTO: 8 MG/G (ref 0–30)
POTASSIUM SERPL-SCNC: 3.8 MMOL/L (ref 3.5–5.5)
PROT SERPL-MCNC: 7.2 G/DL (ref 6.4–8.2)
SODIUM SERPL-SCNC: 141 MMOL/L (ref 136–145)
TRIGL SERPL-MCNC: 43 MG/DL
VLDLC SERPL CALC-MCNC: 8.6 MG/DL

## 2024-08-29 PROCEDURE — 83036 HEMOGLOBIN GLYCOSYLATED A1C: CPT

## 2024-08-29 PROCEDURE — 82570 ASSAY OF URINE CREATININE: CPT

## 2024-08-29 PROCEDURE — 80061 LIPID PANEL: CPT

## 2024-08-29 PROCEDURE — 36415 COLL VENOUS BLD VENIPUNCTURE: CPT

## 2024-08-29 PROCEDURE — 80053 COMPREHEN METABOLIC PANEL: CPT

## 2024-08-29 PROCEDURE — 82043 UR ALBUMIN QUANTITATIVE: CPT

## 2024-09-10 DIAGNOSIS — I10 PRIMARY HYPERTENSION: ICD-10-CM

## 2024-09-10 RX ORDER — LISINOPRIL 20 MG/1
20 TABLET ORAL DAILY
Qty: 90 TABLET | Refills: 3 | Status: SHIPPED | OUTPATIENT
Start: 2024-09-10

## 2024-09-16 ENCOUNTER — OFFICE VISIT (OUTPATIENT)
Facility: CLINIC | Age: 74
End: 2024-09-16
Payer: MEDICARE

## 2024-09-16 VITALS
HEART RATE: 93 BPM | BODY MASS INDEX: 39.85 KG/M2 | OXYGEN SATURATION: 98 % | HEIGHT: 60 IN | DIASTOLIC BLOOD PRESSURE: 60 MMHG | WEIGHT: 203 LBS | TEMPERATURE: 98.1 F | SYSTOLIC BLOOD PRESSURE: 104 MMHG | RESPIRATION RATE: 13 BRPM

## 2024-09-16 DIAGNOSIS — N18.31 STAGE 3A CHRONIC KIDNEY DISEASE (HCC): ICD-10-CM

## 2024-09-16 DIAGNOSIS — E11.69 TYPE 2 DIABETES MELLITUS WITH OTHER SPECIFIED COMPLICATION, WITHOUT LONG-TERM CURRENT USE OF INSULIN (HCC): Primary | ICD-10-CM

## 2024-09-16 DIAGNOSIS — I10 ESSENTIAL (PRIMARY) HYPERTENSION: Chronic | ICD-10-CM

## 2024-09-16 DIAGNOSIS — Z23 NEEDS FLU SHOT: ICD-10-CM

## 2024-09-16 DIAGNOSIS — E11.69 HYPERLIPIDEMIA ASSOCIATED WITH TYPE 2 DIABETES MELLITUS (HCC): Chronic | ICD-10-CM

## 2024-09-16 DIAGNOSIS — E78.5 HYPERLIPIDEMIA ASSOCIATED WITH TYPE 2 DIABETES MELLITUS (HCC): Chronic | ICD-10-CM

## 2024-09-16 PROBLEM — N18.30 CHRONIC RENAL DISEASE, STAGE III (HCC): Chronic | Status: ACTIVE | Noted: 2022-08-04

## 2024-09-16 PROCEDURE — 99214 OFFICE O/P EST MOD 30 MIN: CPT | Performed by: FAMILY MEDICINE

## 2024-09-16 PROCEDURE — G8417 CALC BMI ABV UP PARAM F/U: HCPCS | Performed by: FAMILY MEDICINE

## 2024-09-16 PROCEDURE — 2022F DILAT RTA XM EVC RTNOPTHY: CPT | Performed by: FAMILY MEDICINE

## 2024-09-16 PROCEDURE — G0008 ADMIN INFLUENZA VIRUS VAC: HCPCS | Performed by: FAMILY MEDICINE

## 2024-09-16 PROCEDURE — 1036F TOBACCO NON-USER: CPT | Performed by: FAMILY MEDICINE

## 2024-09-16 PROCEDURE — G8399 PT W/DXA RESULTS DOCUMENT: HCPCS | Performed by: FAMILY MEDICINE

## 2024-09-16 PROCEDURE — 3017F COLORECTAL CA SCREEN DOC REV: CPT | Performed by: FAMILY MEDICINE

## 2024-09-16 PROCEDURE — G8427 DOCREV CUR MEDS BY ELIG CLIN: HCPCS | Performed by: FAMILY MEDICINE

## 2024-09-16 PROCEDURE — 3078F DIAST BP <80 MM HG: CPT | Performed by: FAMILY MEDICINE

## 2024-09-16 PROCEDURE — 3074F SYST BP LT 130 MM HG: CPT | Performed by: FAMILY MEDICINE

## 2024-09-16 PROCEDURE — 3044F HG A1C LEVEL LT 7.0%: CPT | Performed by: FAMILY MEDICINE

## 2024-09-16 PROCEDURE — 1090F PRES/ABSN URINE INCON ASSESS: CPT | Performed by: FAMILY MEDICINE

## 2024-09-16 PROCEDURE — 90653 IIV ADJUVANT VACCINE IM: CPT | Performed by: FAMILY MEDICINE

## 2024-09-16 PROCEDURE — 1123F ACP DISCUSS/DSCN MKR DOCD: CPT | Performed by: FAMILY MEDICINE

## 2024-09-16 SDOH — ECONOMIC STABILITY: FOOD INSECURITY: WITHIN THE PAST 12 MONTHS, YOU WORRIED THAT YOUR FOOD WOULD RUN OUT BEFORE YOU GOT MONEY TO BUY MORE.: NEVER TRUE

## 2024-09-16 SDOH — ECONOMIC STABILITY: FOOD INSECURITY: WITHIN THE PAST 12 MONTHS, THE FOOD YOU BOUGHT JUST DIDN'T LAST AND YOU DIDN'T HAVE MONEY TO GET MORE.: NEVER TRUE

## 2024-09-16 SDOH — ECONOMIC STABILITY: INCOME INSECURITY: HOW HARD IS IT FOR YOU TO PAY FOR THE VERY BASICS LIKE FOOD, HOUSING, MEDICAL CARE, AND HEATING?: NOT HARD AT ALL

## 2024-09-16 ASSESSMENT — PATIENT HEALTH QUESTIONNAIRE - PHQ9
10. IF YOU CHECKED OFF ANY PROBLEMS, HOW DIFFICULT HAVE THESE PROBLEMS MADE IT FOR YOU TO DO YOUR WORK, TAKE CARE OF THINGS AT HOME, OR GET ALONG WITH OTHER PEOPLE: NOT DIFFICULT AT ALL
7. TROUBLE CONCENTRATING ON THINGS, SUCH AS READING THE NEWSPAPER OR WATCHING TELEVISION: NOT AT ALL
SUM OF ALL RESPONSES TO PHQ QUESTIONS 1-9: 0
1. LITTLE INTEREST OR PLEASURE IN DOING THINGS: NOT AT ALL
3. TROUBLE FALLING OR STAYING ASLEEP: NOT AT ALL
SUM OF ALL RESPONSES TO PHQ QUESTIONS 1-9: 0
SUM OF ALL RESPONSES TO PHQ9 QUESTIONS 1 & 2: 0
5. POOR APPETITE OR OVEREATING: NOT AT ALL
9. THOUGHTS THAT YOU WOULD BE BETTER OFF DEAD, OR OF HURTING YOURSELF: NOT AT ALL
8. MOVING OR SPEAKING SO SLOWLY THAT OTHER PEOPLE COULD HAVE NOTICED. OR THE OPPOSITE, BEING SO FIGETY OR RESTLESS THAT YOU HAVE BEEN MOVING AROUND A LOT MORE THAN USUAL: NOT AT ALL
SUM OF ALL RESPONSES TO PHQ QUESTIONS 1-9: 0
2. FEELING DOWN, DEPRESSED OR HOPELESS: NOT AT ALL
4. FEELING TIRED OR HAVING LITTLE ENERGY: NOT AT ALL
SUM OF ALL RESPONSES TO PHQ QUESTIONS 1-9: 0
6. FEELING BAD ABOUT YOURSELF - OR THAT YOU ARE A FAILURE OR HAVE LET YOURSELF OR YOUR FAMILY DOWN: NOT AT ALL

## 2024-12-06 DIAGNOSIS — E11.69 HYPERLIPIDEMIA ASSOCIATED WITH TYPE 2 DIABETES MELLITUS (HCC): ICD-10-CM

## 2024-12-06 DIAGNOSIS — E78.5 HYPERLIPIDEMIA ASSOCIATED WITH TYPE 2 DIABETES MELLITUS (HCC): ICD-10-CM

## 2024-12-09 RX ORDER — ATORVASTATIN CALCIUM 20 MG/1
20 TABLET, FILM COATED ORAL DAILY
Qty: 90 TABLET | Refills: 0 | Status: SHIPPED | OUTPATIENT
Start: 2024-12-09 | End: 2024-12-09 | Stop reason: SDUPTHER

## 2024-12-09 RX ORDER — ATORVASTATIN CALCIUM 20 MG/1
20 TABLET, FILM COATED ORAL DAILY
Qty: 90 TABLET | Refills: 1 | Status: SHIPPED | OUTPATIENT
Start: 2024-12-09

## 2024-12-09 NOTE — TELEPHONE ENCOUNTER
Last seen 9/16/2024   Last labs   Last filled  6/17/24  Next appointment 12/16/2024     Lab Results   Component Value Date     08/29/2024    K 3.8 08/29/2024     08/29/2024    CO2 25 08/29/2024    BUN 23 (H) 08/29/2024    CREATININE 1.18 08/29/2024    GLUCOSE 82 08/29/2024    CALCIUM 8.9 08/29/2024    BILITOT 0.5 08/29/2024    ALKPHOS 114 08/29/2024    AST 18 08/29/2024    ALT 18 08/29/2024    LABGLOM 49 (L) 08/29/2024    GFRAA >60 03/10/2022    AGRATIO 1.5 10/27/2022    GLOB 3.5 08/29/2024

## 2024-12-15 NOTE — PROGRESS NOTES
ASSESSMENT/PLAN:  1. Type 2 diabetes mellitus with other specified complication, without long-term current use of insulin (HCC)  -     Comprehensive Metabolic Panel; Future  -     Lipid Panel; Future  -     Hemoglobin A1C; Future  -     Microalbumin / Creatinine Urine Ratio; Future  2. Hyperlipidemia associated with type 2 diabetes mellitus (HCC)  -     Comprehensive Metabolic Panel; Future  -     Lipid Panel; Future  3. Stage 3a chronic kidney disease (HCC)  -     Comprehensive Metabolic Panel; Future  -     Lipid Panel; Future  4. Essential (primary) hypertension  Assessment & Plan:   Chronic, at goal (stable), continue current treatment plan  Orders:  -     Comprehensive Metabolic Panel; Future  -     Lipid Panel; Future        Return in about 3 months (around 3/16/2025) for DM, HLD, HTN, CKD, lab review.      SUBJECTIVE/OBJECTIVE:    Chief Complaint   Patient presents with    Diabetes    Hyperlipidemia    Chronic Kidney Disease    Hypertension         HPI    Ashley Pineda is a 74 y.o. female presenting today for    3 months  follow up of dm, htn, hld, ckd.  Pt has been doing well overall.     Patient does not need medication refills today.      New concerns today: none        Review of Systems   Constitutional: Negative.    HENT: Negative.     Respiratory: Negative.     Cardiovascular: Negative.    All other systems reviewed and are negative.      Physical Exam  Vitals and nursing note reviewed.   Constitutional:       General: She is not in acute distress.     Appearance: Normal appearance.   HENT:      Head: Normocephalic and atraumatic.      Right Ear: External ear normal.      Left Ear: External ear normal.      Nose: Nose normal.      Mouth/Throat:      Mouth: Mucous membranes are moist.   Eyes:      Extraocular Movements: Extraocular movements intact.      Conjunctiva/sclera: Conjunctivae normal.   Cardiovascular:      Rate and Rhythm: Normal rate and regular rhythm.      Heart sounds: No murmur heard.

## 2024-12-16 ENCOUNTER — OFFICE VISIT (OUTPATIENT)
Facility: CLINIC | Age: 74
End: 2024-12-16
Payer: MEDICARE

## 2024-12-16 VITALS
OXYGEN SATURATION: 99 % | TEMPERATURE: 96.9 F | HEIGHT: 60 IN | BODY MASS INDEX: 40.44 KG/M2 | SYSTOLIC BLOOD PRESSURE: 111 MMHG | WEIGHT: 206 LBS | DIASTOLIC BLOOD PRESSURE: 69 MMHG | HEART RATE: 85 BPM | RESPIRATION RATE: 14 BRPM

## 2024-12-16 DIAGNOSIS — E11.69 HYPERLIPIDEMIA ASSOCIATED WITH TYPE 2 DIABETES MELLITUS (HCC): ICD-10-CM

## 2024-12-16 DIAGNOSIS — E78.5 HYPERLIPIDEMIA ASSOCIATED WITH TYPE 2 DIABETES MELLITUS (HCC): ICD-10-CM

## 2024-12-16 DIAGNOSIS — I10 ESSENTIAL (PRIMARY) HYPERTENSION: ICD-10-CM

## 2024-12-16 DIAGNOSIS — N18.31 STAGE 3A CHRONIC KIDNEY DISEASE (HCC): ICD-10-CM

## 2024-12-16 DIAGNOSIS — E11.69 TYPE 2 DIABETES MELLITUS WITH OTHER SPECIFIED COMPLICATION, WITHOUT LONG-TERM CURRENT USE OF INSULIN (HCC): Primary | ICD-10-CM

## 2024-12-16 PROCEDURE — 1036F TOBACCO NON-USER: CPT | Performed by: FAMILY MEDICINE

## 2024-12-16 PROCEDURE — 3074F SYST BP LT 130 MM HG: CPT | Performed by: FAMILY MEDICINE

## 2024-12-16 PROCEDURE — G8427 DOCREV CUR MEDS BY ELIG CLIN: HCPCS | Performed by: FAMILY MEDICINE

## 2024-12-16 PROCEDURE — 1123F ACP DISCUSS/DSCN MKR DOCD: CPT | Performed by: FAMILY MEDICINE

## 2024-12-16 PROCEDURE — 1160F RVW MEDS BY RX/DR IN RCRD: CPT | Performed by: FAMILY MEDICINE

## 2024-12-16 PROCEDURE — 3078F DIAST BP <80 MM HG: CPT | Performed by: FAMILY MEDICINE

## 2024-12-16 PROCEDURE — 1159F MED LIST DOCD IN RCRD: CPT | Performed by: FAMILY MEDICINE

## 2024-12-16 PROCEDURE — 3044F HG A1C LEVEL LT 7.0%: CPT | Performed by: FAMILY MEDICINE

## 2024-12-16 PROCEDURE — G8417 CALC BMI ABV UP PARAM F/U: HCPCS | Performed by: FAMILY MEDICINE

## 2024-12-16 PROCEDURE — G8399 PT W/DXA RESULTS DOCUMENT: HCPCS | Performed by: FAMILY MEDICINE

## 2024-12-16 PROCEDURE — G8482 FLU IMMUNIZE ORDER/ADMIN: HCPCS | Performed by: FAMILY MEDICINE

## 2024-12-16 PROCEDURE — 1090F PRES/ABSN URINE INCON ASSESS: CPT | Performed by: FAMILY MEDICINE

## 2024-12-16 PROCEDURE — 2022F DILAT RTA XM EVC RTNOPTHY: CPT | Performed by: FAMILY MEDICINE

## 2024-12-16 PROCEDURE — 3017F COLORECTAL CA SCREEN DOC REV: CPT | Performed by: FAMILY MEDICINE

## 2024-12-16 PROCEDURE — 99214 OFFICE O/P EST MOD 30 MIN: CPT | Performed by: FAMILY MEDICINE

## 2024-12-16 SDOH — ECONOMIC STABILITY: FOOD INSECURITY: WITHIN THE PAST 12 MONTHS, YOU WORRIED THAT YOUR FOOD WOULD RUN OUT BEFORE YOU GOT MONEY TO BUY MORE.: PATIENT DECLINED

## 2024-12-16 SDOH — ECONOMIC STABILITY: INCOME INSECURITY: HOW HARD IS IT FOR YOU TO PAY FOR THE VERY BASICS LIKE FOOD, HOUSING, MEDICAL CARE, AND HEATING?: PATIENT DECLINED

## 2024-12-16 SDOH — ECONOMIC STABILITY: FOOD INSECURITY: WITHIN THE PAST 12 MONTHS, THE FOOD YOU BOUGHT JUST DIDN'T LAST AND YOU DIDN'T HAVE MONEY TO GET MORE.: PATIENT DECLINED

## 2024-12-16 NOTE — PROGRESS NOTES
Ashley Pineda presents today for   Chief Complaint   Patient presents with    Diabetes    Hyperlipidemia    Chronic Kidney Disease    Hypertension       Is someone accompanying this pt? no    Is the patient using any DME equipment during OV? no    Depression Screenin/16/2024    11:05 AM 3/11/2024    11:01 AM 2023    10:50 AM 2023    11:32 AM 2023    10:56 AM 2022    10:34 AM 2022    11:12 AM   PHQ-9 Questionaire   Little interest or pleasure in doing things 0 0 0 0 0 0 0   Feeling down, depressed, or hopeless 0 0 0 0 0 0 0   Trouble falling or staying asleep, or sleeping too much 0 2 0 0      Feeling tired or having little energy 0 0 0 0      Poor appetite or overeating 0 0 0 0      Feeling bad about yourself - or that you are a failure or have let yourself or your family down 0 0 0 0      Trouble concentrating on things, such as reading the newspaper or watching television 0 0 0 0      Moving or speaking so slowly that other people could have noticed. Or the opposite - being so fidgety or restless that you have been moving around a lot more than usual 0 0 0 0      Thoughts that you would be better off dead, or of hurting yourself in some way 0 0 0 0      PHQ-9 Total Score 0 2 0 0 0 0 0   If you checked off any problems, how difficult have these problems made it for you to do your work, take care of things at home, or get along with other people? 0 0 0 0           MAIKOL 7-Anxiety        No data to display                   Learning Assessment:  No question data found.     Fall Risk       No data to display                   Travel Screening:    Travel Screening     No screening recorded since 12/15/24 0000       Travel History   Travel since 24    No documented travel since 24            Health Maintenance reviewed and discussed and ordered per Provider.  Transportation Needs: Unknown (2024)    PRAPARE - Transportation     Lack of Transportation (Medical): Not on file

## 2025-01-16 RX ORDER — FUROSEMIDE 40 MG/1
TABLET ORAL
Qty: 90 TABLET | Refills: 3 | Status: SHIPPED | OUTPATIENT
Start: 2025-01-16

## 2025-01-16 NOTE — TELEPHONE ENCOUNTER
Last seen 12/17/2024   Last labs   Last filled  9/7/23  Next appointment 3/17/2025     Lab Results   Component Value Date     08/29/2024    K 3.8 08/29/2024     08/29/2024    CO2 25 08/29/2024    BUN 23 (H) 08/29/2024    CREATININE 1.18 08/29/2024    GLUCOSE 82 08/29/2024    CALCIUM 8.9 08/29/2024    BILITOT 0.5 08/29/2024    ALKPHOS 114 08/29/2024    AST 18 08/29/2024    ALT 18 08/29/2024    LABGLOM 49 (L) 08/29/2024    GFRAA >60 03/10/2022    AGRATIO 1.5 10/27/2022    GLOB 3.5 08/29/2024

## 2025-03-06 ENCOUNTER — HOSPITAL ENCOUNTER (OUTPATIENT)
Facility: HOSPITAL | Age: 75
Discharge: HOME | End: 2025-03-09
Payer: MEDICARE

## 2025-03-06 DIAGNOSIS — E11.69 HYPERLIPIDEMIA ASSOCIATED WITH TYPE 2 DIABETES MELLITUS (HCC): ICD-10-CM

## 2025-03-06 DIAGNOSIS — I10 ESSENTIAL (PRIMARY) HYPERTENSION: ICD-10-CM

## 2025-03-06 DIAGNOSIS — E78.5 HYPERLIPIDEMIA ASSOCIATED WITH TYPE 2 DIABETES MELLITUS (HCC): ICD-10-CM

## 2025-03-06 DIAGNOSIS — N18.31 STAGE 3A CHRONIC KIDNEY DISEASE (HCC): ICD-10-CM

## 2025-03-06 DIAGNOSIS — E11.69 TYPE 2 DIABETES MELLITUS WITH OTHER SPECIFIED COMPLICATION, WITHOUT LONG-TERM CURRENT USE OF INSULIN (HCC): ICD-10-CM

## 2025-03-06 LAB
ALBUMIN SERPL-MCNC: 3.4 G/DL (ref 3.4–5)
ALBUMIN/GLOB SERPL: 0.9 (ref 0.8–1.7)
ALP SERPL-CCNC: 129 U/L (ref 45–117)
ALT SERPL-CCNC: 28 U/L (ref 13–56)
ANION GAP SERPL CALC-SCNC: 5 MMOL/L (ref 3–18)
AST SERPL-CCNC: 19 U/L (ref 10–38)
BILIRUB SERPL-MCNC: 0.4 MG/DL (ref 0.2–1)
BUN SERPL-MCNC: 18 MG/DL (ref 7–18)
BUN/CREAT SERPL: 17 (ref 12–20)
CALCIUM SERPL-MCNC: 8.9 MG/DL (ref 8.5–10.1)
CHLORIDE SERPL-SCNC: 111 MMOL/L (ref 100–111)
CHOLEST SERPL-MCNC: 196 MG/DL
CO2 SERPL-SCNC: 26 MMOL/L (ref 21–32)
CREAT SERPL-MCNC: 1.08 MG/DL (ref 0.6–1.3)
CREAT UR-MCNC: 71 MG/DL (ref 30–125)
EST. AVERAGE GLUCOSE BLD GHB EST-MCNC: 120 MG/DL
GLOBULIN SER CALC-MCNC: 3.8 G/DL (ref 2–4)
GLUCOSE SERPL-MCNC: 91 MG/DL (ref 74–99)
HBA1C MFR BLD: 5.8 % (ref 4.2–5.6)
HDLC SERPL-MCNC: 102 MG/DL (ref 40–60)
HDLC SERPL: 1.9 (ref 0–5)
LDLC SERPL CALC-MCNC: 83.8 MG/DL (ref 0–100)
LIPID PANEL: ABNORMAL
MICROALBUMIN UR-MCNC: 2.65 MG/DL (ref 0–3)
MICROALBUMIN/CREAT UR-RTO: 37 MG/G (ref 0–30)
POTASSIUM SERPL-SCNC: 4.3 MMOL/L (ref 3.5–5.5)
PROT SERPL-MCNC: 7.2 G/DL (ref 6.4–8.2)
SODIUM SERPL-SCNC: 142 MMOL/L (ref 136–145)
TRIGL SERPL-MCNC: 51 MG/DL
VLDLC SERPL CALC-MCNC: 10.2 MG/DL

## 2025-03-06 PROCEDURE — 36415 COLL VENOUS BLD VENIPUNCTURE: CPT

## 2025-03-06 PROCEDURE — 80061 LIPID PANEL: CPT

## 2025-03-06 PROCEDURE — 80053 COMPREHEN METABOLIC PANEL: CPT

## 2025-03-06 PROCEDURE — 82043 UR ALBUMIN QUANTITATIVE: CPT

## 2025-03-06 PROCEDURE — 82570 ASSAY OF URINE CREATININE: CPT

## 2025-03-06 PROCEDURE — 83036 HEMOGLOBIN GLYCOSYLATED A1C: CPT

## 2025-03-16 NOTE — PROGRESS NOTES
ASSESSMENT/PLAN:  1. Type 2 diabetes mellitus with stage 3a chronic kidney disease, without long-term current use of insulin (HCC)  Assessment & Plan:   Chronic, at goal (stable), continue current treatment plan  2. Essential (primary) hypertension  Assessment & Plan:    Hypotensive but asymptomatic.  Patient chooses to d/c lasix; trial off.  Anticipate will need small dose of diuretic.  Pt will call if leg swelling.  Would send lasix 20 qd prn swelling  Orders:  -     lisinopril (PRINIVIL;ZESTRIL) 5 MG tablet; Take 1 tablet by mouth daily, Disp-90 tablet, R-4Normal  3. Hyperlipidemia associated with type 2 diabetes mellitus (HCC)  Assessment & Plan:   Chronic, at goal (stable), continue current treatment plan  4. Stage 3a chronic kidney disease (HCC)  Assessment & Plan:   Chronic, at goal (stable), continue current treatment plan  5. Morbid (severe) obesity due to excess calories (E66.01)  6. Body mass index [BMI] 39.0-39.9, adult (Z68.39)  Assessment & Plan:    Increase activity as tolerated.   7. Schizoaffective disorder, bipolar type (HCC)  Assessment & Plan:   Monitored by specialist- no acute findings meriting change in the plan        Return in about 3 months (around 6/17/2025) for DM, HTN, HLD, CKD, (no labs).    SUBJECTIVE/OBJECTIVE:    Chief Complaint   Patient presents with    Diabetes    Hyperlipidemia    Hypertension         HPI    Ashley Pineda is a 75 y.o. female presenting today for    3 months  follow up of dm, htn, hld, ckd.   Pt has been doing well overall.   Pt reports that she will go out and walk at a store for about 2 hours 3 days per week.  She does not need to hold on to a cart.  If the weather is nice, she will walk outside.        Patient had labs on 3/6/25.  Labs reviewed in detail with patient       Patient does not need medication refills today.      New concerns today: none        Review of Systems   Constitutional: Negative.    HENT: Negative.     Respiratory: Negative.

## 2025-03-17 ENCOUNTER — OFFICE VISIT (OUTPATIENT)
Facility: CLINIC | Age: 75
End: 2025-03-17
Payer: MEDICARE

## 2025-03-17 VITALS
HEART RATE: 70 BPM | SYSTOLIC BLOOD PRESSURE: 95 MMHG | HEIGHT: 60 IN | TEMPERATURE: 97.7 F | OXYGEN SATURATION: 97 % | DIASTOLIC BLOOD PRESSURE: 63 MMHG | BODY MASS INDEX: 39.7 KG/M2 | WEIGHT: 202.2 LBS

## 2025-03-17 DIAGNOSIS — E66.01 MORBID (SEVERE) OBESITY DUE TO EXCESS CALORIES: ICD-10-CM

## 2025-03-17 DIAGNOSIS — N18.31 STAGE 3A CHRONIC KIDNEY DISEASE (HCC): ICD-10-CM

## 2025-03-17 DIAGNOSIS — E78.5 HYPERLIPIDEMIA ASSOCIATED WITH TYPE 2 DIABETES MELLITUS: ICD-10-CM

## 2025-03-17 DIAGNOSIS — E11.22 TYPE 2 DIABETES MELLITUS WITH STAGE 3A CHRONIC KIDNEY DISEASE, WITHOUT LONG-TERM CURRENT USE OF INSULIN (HCC): Primary | ICD-10-CM

## 2025-03-17 DIAGNOSIS — I10 ESSENTIAL (PRIMARY) HYPERTENSION: Chronic | ICD-10-CM

## 2025-03-17 DIAGNOSIS — E11.69 HYPERLIPIDEMIA ASSOCIATED WITH TYPE 2 DIABETES MELLITUS: ICD-10-CM

## 2025-03-17 DIAGNOSIS — N18.31 TYPE 2 DIABETES MELLITUS WITH STAGE 3A CHRONIC KIDNEY DISEASE, WITHOUT LONG-TERM CURRENT USE OF INSULIN (HCC): Primary | ICD-10-CM

## 2025-03-17 DIAGNOSIS — F25.0 SCHIZOAFFECTIVE DISORDER, BIPOLAR TYPE (HCC): ICD-10-CM

## 2025-03-17 PROCEDURE — G8427 DOCREV CUR MEDS BY ELIG CLIN: HCPCS | Performed by: FAMILY MEDICINE

## 2025-03-17 PROCEDURE — 3074F SYST BP LT 130 MM HG: CPT | Performed by: FAMILY MEDICINE

## 2025-03-17 PROCEDURE — 1159F MED LIST DOCD IN RCRD: CPT | Performed by: FAMILY MEDICINE

## 2025-03-17 PROCEDURE — 3078F DIAST BP <80 MM HG: CPT | Performed by: FAMILY MEDICINE

## 2025-03-17 PROCEDURE — G8399 PT W/DXA RESULTS DOCUMENT: HCPCS | Performed by: FAMILY MEDICINE

## 2025-03-17 PROCEDURE — 1090F PRES/ABSN URINE INCON ASSESS: CPT | Performed by: FAMILY MEDICINE

## 2025-03-17 PROCEDURE — 2022F DILAT RTA XM EVC RTNOPTHY: CPT | Performed by: FAMILY MEDICINE

## 2025-03-17 PROCEDURE — 1160F RVW MEDS BY RX/DR IN RCRD: CPT | Performed by: FAMILY MEDICINE

## 2025-03-17 PROCEDURE — 1126F AMNT PAIN NOTED NONE PRSNT: CPT | Performed by: FAMILY MEDICINE

## 2025-03-17 PROCEDURE — 1123F ACP DISCUSS/DSCN MKR DOCD: CPT | Performed by: FAMILY MEDICINE

## 2025-03-17 PROCEDURE — 3017F COLORECTAL CA SCREEN DOC REV: CPT | Performed by: FAMILY MEDICINE

## 2025-03-17 PROCEDURE — 99214 OFFICE O/P EST MOD 30 MIN: CPT | Performed by: FAMILY MEDICINE

## 2025-03-17 PROCEDURE — 3044F HG A1C LEVEL LT 7.0%: CPT | Performed by: FAMILY MEDICINE

## 2025-03-17 PROCEDURE — G8417 CALC BMI ABV UP PARAM F/U: HCPCS | Performed by: FAMILY MEDICINE

## 2025-03-17 PROCEDURE — 1036F TOBACCO NON-USER: CPT | Performed by: FAMILY MEDICINE

## 2025-03-17 RX ORDER — LISINOPRIL 5 MG/1
5 TABLET ORAL DAILY
Qty: 90 TABLET | Refills: 4 | Status: SHIPPED | OUTPATIENT
Start: 2025-03-17

## 2025-03-17 SDOH — ECONOMIC STABILITY: FOOD INSECURITY: WITHIN THE PAST 12 MONTHS, THE FOOD YOU BOUGHT JUST DIDN'T LAST AND YOU DIDN'T HAVE MONEY TO GET MORE.: NEVER TRUE

## 2025-03-17 SDOH — ECONOMIC STABILITY: FOOD INSECURITY: WITHIN THE PAST 12 MONTHS, YOU WORRIED THAT YOUR FOOD WOULD RUN OUT BEFORE YOU GOT MONEY TO BUY MORE.: NEVER TRUE

## 2025-03-17 ASSESSMENT — PATIENT HEALTH QUESTIONNAIRE - PHQ9
3. TROUBLE FALLING OR STAYING ASLEEP: NOT AT ALL
8. MOVING OR SPEAKING SO SLOWLY THAT OTHER PEOPLE COULD HAVE NOTICED. OR THE OPPOSITE, BEING SO FIGETY OR RESTLESS THAT YOU HAVE BEEN MOVING AROUND A LOT MORE THAN USUAL: NOT AT ALL
10. IF YOU CHECKED OFF ANY PROBLEMS, HOW DIFFICULT HAVE THESE PROBLEMS MADE IT FOR YOU TO DO YOUR WORK, TAKE CARE OF THINGS AT HOME, OR GET ALONG WITH OTHER PEOPLE: NOT DIFFICULT AT ALL
7. TROUBLE CONCENTRATING ON THINGS, SUCH AS READING THE NEWSPAPER OR WATCHING TELEVISION: NOT AT ALL
SUM OF ALL RESPONSES TO PHQ QUESTIONS 1-9: 0
5. POOR APPETITE OR OVEREATING: NOT AT ALL
SUM OF ALL RESPONSES TO PHQ QUESTIONS 1-9: 0
SUM OF ALL RESPONSES TO PHQ QUESTIONS 1-9: 0
1. LITTLE INTEREST OR PLEASURE IN DOING THINGS: NOT AT ALL
9. THOUGHTS THAT YOU WOULD BE BETTER OFF DEAD, OR OF HURTING YOURSELF: NOT AT ALL
4. FEELING TIRED OR HAVING LITTLE ENERGY: NOT AT ALL
SUM OF ALL RESPONSES TO PHQ QUESTIONS 1-9: 0
6. FEELING BAD ABOUT YOURSELF - OR THAT YOU ARE A FAILURE OR HAVE LET YOURSELF OR YOUR FAMILY DOWN: NOT AT ALL
2. FEELING DOWN, DEPRESSED OR HOPELESS: NOT AT ALL

## 2025-03-17 NOTE — PATIENT INSTRUCTIONS
Your labs were good!    Your blood pressure is low so we need to make some changes.  We will decrease the lisinopril to 5mg daily.  You have 20mg pills; please take 1/2 pill daily (10mg) until you are able to  the new, 5 mg pills.      We will also stop the lasix.  It looks like you have been taking that for the swelling in your legs.  It does also lower your blood pressure.  You have opted to try completely stopping this med.  If you have swelling again, please call and let me know.  We can restart it at a lower dose if you need it.  Please continue to walk regularly.  Please elevate your legs when you are seated.  Please try to wear compression socks to help minimize swelling in your legs.

## 2025-03-17 NOTE — PROGRESS NOTES
Ashley Pineda presents today for   Chief Complaint   Patient presents with    Diabetes    Hyperlipidemia    Hypertension       Is someone accompanying this pt? No    Is the patient using any DME equipment during OV? No    Depression Screening:      3/17/2025    11:33 AM 12/17/2024    10:22 AM 9/16/2024    11:05 AM 3/11/2024    11:01 AM 9/11/2023    10:50 AM 5/24/2023    11:32 AM 2/23/2023    10:56 AM   PHQ-9 Questionaire   Little interest or pleasure in doing things 0 0 0 0 0 0 0   Feeling down, depressed, or hopeless 0 0 0 0 0 0 0   Trouble falling or staying asleep, or sleeping too much 0 0 0 2 0 0    Feeling tired or having little energy 0 0 0 0 0 0    Poor appetite or overeating 0 0 0 0 0 0    Feeling bad about yourself - or that you are a failure or have let yourself or your family down 0 0 0 0 0 0    Trouble concentrating on things, such as reading the newspaper or watching television 0 0 0 0 0 0    Moving or speaking so slowly that other people could have noticed. Or the opposite - being so fidgety or restless that you have been moving around a lot more than usual 0 0 0 0 0 0    Thoughts that you would be better off dead, or of hurting yourself in some way 0 0 0 0 0 0    PHQ-9 Total Score 0 0 0 2 0 0 0   If you checked off any problems, how difficult have these problems made it for you to do your work, take care of things at home, or get along with other people? 0 0 0 0 0 0         MAIKOL 7-Anxiety        No data to display                 Travel Screening:    Travel Screening     No screening recorded since 03/16/25 0000       Travel History   Travel since 02/17/25    No documented travel since 02/17/25          Health Maintenance reviewed and discussed and ordered per Provider.  Transportation Needs: No Transportation Needs (3/17/2025)    PRAPARE - Transportation     Lack of Transportation (Medical): No     Lack of Transportation (Non-Medical): No      Food Insecurity: No Food Insecurity (3/17/2025)    Hunger

## 2025-03-24 PROBLEM — N18.31 TYPE 2 DIABETES MELLITUS WITH STAGE 3A CHRONIC KIDNEY DISEASE, WITHOUT LONG-TERM CURRENT USE OF INSULIN (HCC): Chronic | Status: ACTIVE | Noted: 2021-04-28

## 2025-03-24 PROBLEM — N18.31 TYPE 2 DIABETES MELLITUS WITH STAGE 3A CHRONIC KIDNEY DISEASE, WITHOUT LONG-TERM CURRENT USE OF INSULIN (HCC): Status: ACTIVE | Noted: 2021-04-28

## 2025-03-24 PROBLEM — E11.22 TYPE 2 DIABETES MELLITUS WITH STAGE 3A CHRONIC KIDNEY DISEASE, WITHOUT LONG-TERM CURRENT USE OF INSULIN (HCC): Status: ACTIVE | Noted: 2021-04-28

## 2025-03-24 PROBLEM — E11.22 TYPE 2 DIABETES MELLITUS WITH STAGE 3A CHRONIC KIDNEY DISEASE, WITHOUT LONG-TERM CURRENT USE OF INSULIN (HCC): Chronic | Status: ACTIVE | Noted: 2021-04-28

## 2025-03-24 NOTE — ASSESSMENT & PLAN NOTE
Hypotensive but asymptomatic.  Patient chooses to d/c lasix; trial off.  Anticipate will need small dose of diuretic.  Pt will call if leg swelling.  Would send lasix 20 qd prn swelling

## 2025-04-10 ENCOUNTER — TELEPHONE (OUTPATIENT)
Facility: CLINIC | Age: 75
End: 2025-04-10

## 2025-04-10 DIAGNOSIS — E78.5 HYPERLIPIDEMIA ASSOCIATED WITH TYPE 2 DIABETES MELLITUS: ICD-10-CM

## 2025-04-10 DIAGNOSIS — I10 ESSENTIAL (PRIMARY) HYPERTENSION: Chronic | ICD-10-CM

## 2025-04-10 DIAGNOSIS — E11.69 HYPERLIPIDEMIA ASSOCIATED WITH TYPE 2 DIABETES MELLITUS: ICD-10-CM

## 2025-04-10 RX ORDER — ATORVASTATIN CALCIUM 20 MG/1
20 TABLET, FILM COATED ORAL DAILY
Qty: 90 TABLET | Refills: 4 | Status: SHIPPED | OUTPATIENT
Start: 2025-04-10

## 2025-04-10 RX ORDER — LISINOPRIL 5 MG/1
5 TABLET ORAL DAILY
Qty: 90 TABLET | Refills: 4 | Status: SHIPPED | OUTPATIENT
Start: 2025-04-10

## 2025-04-10 NOTE — TELEPHONE ENCOUNTER
lisinopril (PRINIVIL;ZESTRIL) 20 MG tablet  (Patient is requesting prescription for 20 MG be sent instead of the 5 MG)    furosemide (LASIX) 40 MG tablet    Patient requesting refill be sent to [facility] in [place]

## 2025-04-10 NOTE — TELEPHONE ENCOUNTER
Labs:   Lab Results   Component Value Date     03/06/2025    K 4.3 03/06/2025     03/06/2025    CO2 26 03/06/2025    BUN 18 03/06/2025    CREATININE 1.08 03/06/2025    GLUCOSE 91 03/06/2025    CALCIUM 8.9 03/06/2025    BILITOT 0.4 03/06/2025    ALKPHOS 129 (H) 03/06/2025    AST 19 03/06/2025    ALT 28 03/06/2025    LABGLOM 54 (L) 03/06/2025    GFRAA >60 03/10/2022    AGRATIO 1.5 10/27/2022    GLOB 3.8 03/06/2025        Additional Notes:

## 2025-04-10 NOTE — TELEPHONE ENCOUNTER
----- Message from Lamonte ALVARADO sent at 4/10/2025 10:46 AM EDT -----  Regarding: ECC Escalation To Practice  ECC Escalation To Practice      Type of Escalation: Red Flag Symptom  --------------------------------------------------------------------------------------------------------------------------    Information for Provider:  Patient is looking for appointment for: Symptom High blood pressure  Reasons for Message: Unable to reach practice     Additional Information Patient called in to speak somebody in the practice and mentioned a red flag which is high blood pressure. Unable to reach practice.  --------------------------------------------------------------------------------------------------------------------------    Relationship to Patient: Self  Call Back Info: OK to leave message on voicemail  Preferred Call Back Number: Phone [phone]

## 2025-04-10 NOTE — TELEPHONE ENCOUNTER
Patient states that she went to the eye doctor 4/8/25 and her blood pressure was in the 140's/ she states that she is not sure of what it actually was but she was told that she has cataracts in both eyes. She goes back 4/15/25. Patient has not been checking her BP since then and states that she feels alright. She has an upcoming appointment in June and states that she is fine until then. She also states that she would like to be placed back on furosemide due to swelling.

## 2025-05-11 NOTE — PROGRESS NOTES
ASSESSMENT/PLAN:  1. Bilateral lower extremity edema  Assessment & Plan:    Pt initially agreed to go to vasc surg today for immediate care.  Urgent referral placed.  MA attempted to coordinate but was advised by pt that she would not go for an appt today.  Pt states she may be willing to go in a few days.    Orders:  -     [facility], [place] ([address]  2. Lymphedema of both lower extremities  Assessment & Plan:   Pt initially agreed to go to vasc surg today for immediate care.  Urgent referral placed.  MA attempted to coordinate but was advised by pt that she would not go for an appt today.  Pt states she may be willing to go in a few days.    3. Essential (primary) hypertension  Assessment & Plan:   Chronic, at goal (stable), continue current treatment plan        Return if symptoms worsen or fail to improve.      SUBJECTIVE/OBJECTIVE:    Chief Complaint   Patient presents with    Hypertension    Leg Swelling     Right lower leg edema for about 3 weeks. Patient denies any pain.          HPI    Ashley Pineda is a 75 y.o. female presenting today for follow up of elevated bp at a specialist appt.    Patient does not need medication refills today.      New concerns today: pt c/o 3 wk hx b lower leg swelling, R>L.  She has not noticed any wounds.          Review of Systems   Constitutional: Negative.    HENT: Negative.     Respiratory: Negative.     Cardiovascular:  Positive for leg swelling.   All other systems reviewed and are negative.      Physical Exam  Vitals and nursing note reviewed.   Constitutional:       General: She is not in acute distress.     Appearance: Normal appearance.   HENT:      Head: Normocephalic and atraumatic.      Right Ear: External ear normal.      Left Ear: External ear normal.      Nose: Nose normal.      Mouth/Throat:      Mouth: Mucous membranes are moist.   Eyes:      Extraocular Movements: Extraocular movements intact.

## 2025-05-12 ENCOUNTER — TELEPHONE (OUTPATIENT)
Age: 75
End: 2025-05-12

## 2025-05-12 ENCOUNTER — OFFICE VISIT (OUTPATIENT)
Facility: CLINIC | Age: 75
End: 2025-05-12
Payer: MEDICARE

## 2025-05-12 VITALS
TEMPERATURE: 98.6 F | WEIGHT: 208 LBS | HEIGHT: 60 IN | SYSTOLIC BLOOD PRESSURE: 112 MMHG | BODY MASS INDEX: 40.84 KG/M2 | OXYGEN SATURATION: 97 % | RESPIRATION RATE: 13 BRPM | HEART RATE: 87 BPM | DIASTOLIC BLOOD PRESSURE: 69 MMHG

## 2025-05-12 DIAGNOSIS — I89.0 LYMPHEDEMA OF BOTH LOWER EXTREMITIES: ICD-10-CM

## 2025-05-12 DIAGNOSIS — R60.0 BILATERAL LOWER EXTREMITY EDEMA: Primary | ICD-10-CM

## 2025-05-12 DIAGNOSIS — I10 ESSENTIAL (PRIMARY) HYPERTENSION: ICD-10-CM

## 2025-05-12 PROCEDURE — G8417 CALC BMI ABV UP PARAM F/U: HCPCS | Performed by: FAMILY MEDICINE

## 2025-05-12 PROCEDURE — 3078F DIAST BP <80 MM HG: CPT | Performed by: FAMILY MEDICINE

## 2025-05-12 PROCEDURE — 1123F ACP DISCUSS/DSCN MKR DOCD: CPT | Performed by: FAMILY MEDICINE

## 2025-05-12 PROCEDURE — 1160F RVW MEDS BY RX/DR IN RCRD: CPT | Performed by: FAMILY MEDICINE

## 2025-05-12 PROCEDURE — 1036F TOBACCO NON-USER: CPT | Performed by: FAMILY MEDICINE

## 2025-05-12 PROCEDURE — 99214 OFFICE O/P EST MOD 30 MIN: CPT | Performed by: FAMILY MEDICINE

## 2025-05-12 PROCEDURE — G8427 DOCREV CUR MEDS BY ELIG CLIN: HCPCS | Performed by: FAMILY MEDICINE

## 2025-05-12 PROCEDURE — 3017F COLORECTAL CA SCREEN DOC REV: CPT | Performed by: FAMILY MEDICINE

## 2025-05-12 PROCEDURE — 3074F SYST BP LT 130 MM HG: CPT | Performed by: FAMILY MEDICINE

## 2025-05-12 PROCEDURE — 1159F MED LIST DOCD IN RCRD: CPT | Performed by: FAMILY MEDICINE

## 2025-05-12 PROCEDURE — 1090F PRES/ABSN URINE INCON ASSESS: CPT | Performed by: FAMILY MEDICINE

## 2025-05-12 PROCEDURE — G8399 PT W/DXA RESULTS DOCUMENT: HCPCS | Performed by: FAMILY MEDICINE

## 2025-05-12 SDOH — ECONOMIC STABILITY: FOOD INSECURITY: WITHIN THE PAST 12 MONTHS, YOU WORRIED THAT YOUR FOOD WOULD RUN OUT BEFORE YOU GOT MONEY TO BUY MORE.: NEVER TRUE

## 2025-05-12 SDOH — ECONOMIC STABILITY: FOOD INSECURITY: WITHIN THE PAST 12 MONTHS, THE FOOD YOU BOUGHT JUST DIDN'T LAST AND YOU DIDN'T HAVE MONEY TO GET MORE.: NEVER TRUE

## 2025-05-12 ASSESSMENT — PATIENT HEALTH QUESTIONNAIRE - PHQ9
5. POOR APPETITE OR OVEREATING: NOT AT ALL
9. THOUGHTS THAT YOU WOULD BE BETTER OFF DEAD, OR OF HURTING YOURSELF: NOT AT ALL
7. TROUBLE CONCENTRATING ON THINGS, SUCH AS READING THE NEWSPAPER OR WATCHING TELEVISION: NOT AT ALL
SUM OF ALL RESPONSES TO PHQ QUESTIONS 1-9: 0
3. TROUBLE FALLING OR STAYING ASLEEP: NOT AT ALL
SUM OF ALL RESPONSES TO PHQ QUESTIONS 1-9: 0
6. FEELING BAD ABOUT YOURSELF - OR THAT YOU ARE A FAILURE OR HAVE LET YOURSELF OR YOUR FAMILY DOWN: NOT AT ALL
8. MOVING OR SPEAKING SO SLOWLY THAT OTHER PEOPLE COULD HAVE NOTICED. OR THE OPPOSITE, BEING SO FIGETY OR RESTLESS THAT YOU HAVE BEEN MOVING AROUND A LOT MORE THAN USUAL: NOT AT ALL
4. FEELING TIRED OR HAVING LITTLE ENERGY: NOT AT ALL
10. IF YOU CHECKED OFF ANY PROBLEMS, HOW DIFFICULT HAVE THESE PROBLEMS MADE IT FOR YOU TO DO YOUR WORK, TAKE CARE OF THINGS AT HOME, OR GET ALONG WITH OTHER PEOPLE: NOT DIFFICULT AT ALL
2. FEELING DOWN, DEPRESSED OR HOPELESS: NOT AT ALL
1. LITTLE INTEREST OR PLEASURE IN DOING THINGS: NOT AT ALL

## 2025-05-12 NOTE — PROGRESS NOTES
Ashley Pineda presents today for   Chief Complaint   Patient presents with    Chronic Kidney Disease    Cholesterol Problem    Hypertension    Diabetes    Leg Swelling     Right lower leg edema for about 3 weeks. Patient denies any pain.        Is someone accompanying this pt? no    Is the patient using any DME equipment during OV? no    Depression Screenin/12/2025    11:47 AM 3/17/2025    11:33 AM 2024    10:22 AM 2024    11:05 AM 3/11/2024    11:01 AM 2023    10:50 AM 2023    11:32 AM   PHQ-9 Questionaire   Little interest or pleasure in doing things 0 0 0 0 0 0 0   Feeling down, depressed, or hopeless 0 0 0 0 0 0 0   Trouble falling or staying asleep, or sleeping too much 0 0 0 0 2 0 0   Feeling tired or having little energy 0 0 0 0 0 0 0   Poor appetite or overeating 0 0 0 0 0 0 0   Feeling bad about yourself - or that you are a failure or have let yourself or your family down 0 0 0 0 0 0 0   Trouble concentrating on things, such as reading the newspaper or watching television 0 0 0 0 0 0 0   Moving or speaking so slowly that other people could have noticed. Or the opposite - being so fidgety or restless that you have been moving around a lot more than usual 0 0 0 0 0 0 0   Thoughts that you would be better off dead, or of hurting yourself in some way 0 0 0 0 0 0 0   PHQ-9 Total Score 0 0 0 0 2 0 0   If you checked off any problems, how difficult have these problems made it for you to do your work, take care of things at home, or get along with other people? 0 0 0 0 0 0 0        MAIKOL 7-Anxiety        No data to display                   Learning Assessment:  No question data found.     Fall Risk       No data to display                   Travel Screening:    Travel Screening       Question Response    Have you been in contact with someone who was sick? No / Unsure    Do you have any of the following new or worsening symptoms? None of these    Have you traveled internationally or

## 2025-05-12 NOTE — TELEPHONE ENCOUNTER
Incoming call from dr twan goodwin office [facility] spoke with felix at [phone]. She sent a urgent referral notified her that we will have our provider review it and then give her a call back to schedule appt.

## 2025-05-19 PROBLEM — R60.0 BILATERAL LOWER EXTREMITY EDEMA: Status: ACTIVE | Noted: 2025-05-19

## 2025-05-19 NOTE — ASSESSMENT & PLAN NOTE
Pt initially agreed to go to vasc surg today.  Urgent referral placed.  MA attempted to coordinate but was advised by pt that she would not go for an appt today.  Pt states she may be willing to go in a few days.

## 2025-05-19 NOTE — ASSESSMENT & PLAN NOTE
Pt initially agreed to go to vasc surg today for immediate care.  Urgent referral placed.  MA attempted to coordinate but was advised by pt that she would not go for an appt today.  Pt states she may be willing to go in a few days.

## 2025-05-29 ENCOUNTER — OFFICE VISIT (OUTPATIENT)
Age: 75
End: 2025-05-29
Payer: MEDICARE

## 2025-05-29 VITALS
BODY MASS INDEX: 40.44 KG/M2 | OXYGEN SATURATION: 98 % | SYSTOLIC BLOOD PRESSURE: 130 MMHG | RESPIRATION RATE: 18 BRPM | WEIGHT: 206 LBS | HEIGHT: 60 IN | DIASTOLIC BLOOD PRESSURE: 70 MMHG | HEART RATE: 89 BPM

## 2025-05-29 DIAGNOSIS — I89.0 LYMPHEDEMA: Primary | ICD-10-CM

## 2025-05-29 DIAGNOSIS — I83.893 VARICOSE VEINS OF BOTH LEGS WITH EDEMA: ICD-10-CM

## 2025-05-29 DIAGNOSIS — L97.911 ULCER OF RIGHT LOWER EXTREMITY, LIMITED TO BREAKDOWN OF SKIN (HCC): ICD-10-CM

## 2025-05-29 PROCEDURE — 99214 OFFICE O/P EST MOD 30 MIN: CPT | Performed by: PHYSICIAN ASSISTANT

## 2025-05-29 PROCEDURE — 3075F SYST BP GE 130 - 139MM HG: CPT | Performed by: PHYSICIAN ASSISTANT

## 2025-05-29 PROCEDURE — 1159F MED LIST DOCD IN RCRD: CPT | Performed by: PHYSICIAN ASSISTANT

## 2025-05-29 PROCEDURE — 3078F DIAST BP <80 MM HG: CPT | Performed by: PHYSICIAN ASSISTANT

## 2025-05-29 PROCEDURE — 1123F ACP DISCUSS/DSCN MKR DOCD: CPT | Performed by: PHYSICIAN ASSISTANT

## 2025-05-29 PROCEDURE — 1160F RVW MEDS BY RX/DR IN RCRD: CPT | Performed by: PHYSICIAN ASSISTANT

## 2025-05-29 PROCEDURE — 1126F AMNT PAIN NOTED NONE PRSNT: CPT | Performed by: PHYSICIAN ASSISTANT

## 2025-05-29 NOTE — PROGRESS NOTES
Have you been to the ER, urgent care clinic since your last visit?  Hospitalized since your last visit?   NO    Have you seen or consulted any other health care providers outside our system since your last visit?   NO        
35.0-39.9) with comorbidity (HCC)    Varicose veins of both legs with edema    Lymphedema of both lower extremities    Varicose veins of both lower extremities    Cellulitis of finger of left hand    Body mass index [BMI] 39.0-39.9, adult (Z68.39)    Bilateral lower extremity edema     Current Outpatient Medications   Medication Sig Dispense Refill    atorvastatin (LIPITOR) 20 MG tablet Take 1 tablet by mouth daily 90 tablet 4    lisinopril (PRINIVIL;ZESTRIL) 5 MG tablet Take 1 tablet by mouth daily 90 tablet 4     No current facility-administered medications for this visit.     No Known Allergies  Social History     Socioeconomic History    Marital status:      Spouse name: Not on file    Number of children: Not on file    Years of education: Not on file    Highest education level: Not on file   Occupational History    Not on file   Tobacco Use    Smoking status: Never    Smokeless tobacco: Never   Substance and Sexual Activity    Alcohol use: No    Drug use: No    Sexual activity: Not Currently   Other Topics Concern    Not on file   Social History Narrative    Not on file     Social Drivers of Health     Financial Resource Strain: Patient Declined (12/16/2024)    Overall Financial Resource Strain (CARDIA)     Difficulty of Paying Living Expenses: Patient declined   Food Insecurity: No Food Insecurity (5/12/2025)    Hunger Vital Sign     Worried About Running Out of Food in the Last Year: Never true     Ran Out of Food in the Last Year: Never true   Transportation Needs: No Transportation Needs (5/12/2025)    PRAPARE - Transportation     Lack of Transportation (Medical): No     Lack of Transportation (Non-Medical): No   Physical Activity: Insufficiently Active (12/17/2024)    Exercise Vital Sign     Days of Exercise per Week: 2 days     Minutes of Exercise per Session: 30 min   Stress: Not on file   Social Connections: Not on file   Intimate Partner Violence: Not on file   Housing Stability: Low Risk

## 2025-06-18 ENCOUNTER — TELEPHONE (OUTPATIENT)
Age: 75
End: 2025-06-18

## 2025-07-07 ENCOUNTER — OFFICE VISIT (OUTPATIENT)
Facility: CLINIC | Age: 75
End: 2025-07-07

## 2025-07-07 VITALS
HEIGHT: 60 IN | BODY MASS INDEX: 40.64 KG/M2 | SYSTOLIC BLOOD PRESSURE: 127 MMHG | HEART RATE: 83 BPM | OXYGEN SATURATION: 96 % | RESPIRATION RATE: 14 BRPM | TEMPERATURE: 97.8 F | DIASTOLIC BLOOD PRESSURE: 73 MMHG | WEIGHT: 207 LBS

## 2025-07-07 DIAGNOSIS — I10 ESSENTIAL (PRIMARY) HYPERTENSION: Chronic | ICD-10-CM

## 2025-07-07 DIAGNOSIS — N18.31 TYPE 2 DIABETES MELLITUS WITH STAGE 3A CHRONIC KIDNEY DISEASE, WITHOUT LONG-TERM CURRENT USE OF INSULIN (HCC): Primary | Chronic | ICD-10-CM

## 2025-07-07 DIAGNOSIS — I89.0 LYMPHEDEMA OF BOTH LOWER EXTREMITIES: ICD-10-CM

## 2025-07-07 DIAGNOSIS — E11.69 HYPERLIPIDEMIA ASSOCIATED WITH TYPE 2 DIABETES MELLITUS (HCC): Chronic | ICD-10-CM

## 2025-07-07 DIAGNOSIS — N18.31 STAGE 3A CHRONIC KIDNEY DISEASE (HCC): Chronic | ICD-10-CM

## 2025-07-07 DIAGNOSIS — E11.22 TYPE 2 DIABETES MELLITUS WITH STAGE 3A CHRONIC KIDNEY DISEASE, WITHOUT LONG-TERM CURRENT USE OF INSULIN (HCC): Primary | Chronic | ICD-10-CM

## 2025-07-07 DIAGNOSIS — E78.5 HYPERLIPIDEMIA ASSOCIATED WITH TYPE 2 DIABETES MELLITUS (HCC): Chronic | ICD-10-CM

## 2025-07-07 SDOH — ECONOMIC STABILITY: FOOD INSECURITY: WITHIN THE PAST 12 MONTHS, YOU WORRIED THAT YOUR FOOD WOULD RUN OUT BEFORE YOU GOT MONEY TO BUY MORE.: NEVER TRUE

## 2025-07-07 SDOH — ECONOMIC STABILITY: FOOD INSECURITY: WITHIN THE PAST 12 MONTHS, THE FOOD YOU BOUGHT JUST DIDN'T LAST AND YOU DIDN'T HAVE MONEY TO GET MORE.: NEVER TRUE

## 2025-07-07 NOTE — PROGRESS NOTES
Ashley Pineda presents today for   Chief Complaint   Patient presents with    Cholesterol Problem    Chronic Kidney Disease    Diabetes    Hypertension       Is someone accompanying this pt? no    Is the patient using any DME equipment during OV? no    Depression Screenin/12/2025    11:47 AM 3/17/2025    11:33 AM 2024    10:22 AM 2024    11:05 AM 3/11/2024    11:01 AM 2023    10:50 AM 2023    11:32 AM   PHQ-9 Questionaire   Little interest or pleasure in doing things 0 0 0 0 0 0 0   Feeling down, depressed, or hopeless 0 0 0 0 0 0 0   Trouble falling or staying asleep, or sleeping too much 0 0 0 0 2 0 0   Feeling tired or having little energy 0 0 0 0 0 0 0   Poor appetite or overeating 0 0 0 0 0 0 0   Feeling bad about yourself - or that you are a failure or have let yourself or your family down 0 0 0 0 0 0 0   Trouble concentrating on things, such as reading the newspaper or watching television 0 0 0 0 0 0 0   Moving or speaking so slowly that other people could have noticed. Or the opposite - being so fidgety or restless that you have been moving around a lot more than usual 0 0 0 0 0 0 0   Thoughts that you would be better off dead, or of hurting yourself in some way 0 0 0 0 0 0 0   PHQ-9 Total Score 0 0 0 0 2 0 0   If you checked off any problems, how difficult have these problems made it for you to do your work, take care of things at home, or get along with other people? 0 0 0 0 0 0 0        MAIKOL 7-Anxiety        No data to display                   Learning Assessment:  No question data found.     Fall Risk       No data to display                   Travel Screening:    Travel Screening       Question Response    Have you been in contact with someone who was sick? No / Unsure    Do you have any of the following new or worsening symptoms? None of these    Have you traveled internationally or domestically in the last month? No          Travel History   Travel since 27    No 
Mouth/Throat:      Mouth: Mucous membranes are moist.   Eyes:      Extraocular Movements: Extraocular movements intact.      Conjunctiva/sclera: Conjunctivae normal.   Cardiovascular:      Rate and Rhythm: Normal rate and regular rhythm.      Heart sounds: No murmur heard.     No friction rub. No gallop.   Pulmonary:      Effort: Pulmonary effort is normal.      Breath sounds: Normal breath sounds. No wheezing, rhonchi or rales.   Musculoskeletal:         General: Normal range of motion.      Cervical back: Normal range of motion.   Skin:     General: Skin is warm and dry.   Neurological:      Mental Status: She is alert and oriented to person, place, and time.      Coordination: Coordination normal.   Psychiatric:         Mood and Affect: Mood normal.         Behavior: Behavior normal.         Thought Content: Thought content normal.         Judgment: Judgment normal.                   Please note: Portions of this chart were created with Dragon medical speech to text program; unrecognized errors may exist.      An electronic signature was used to authenticate this note.    --Vandana Alcazar MD

## 2025-07-10 ENCOUNTER — TELEPHONE (OUTPATIENT)
Age: 75
End: 2025-07-10

## 2025-07-10 NOTE — TELEPHONE ENCOUNTER
Sarah from [facility] kayden and stating that they jacque wound care orders on this patient , Ph#[phone]  Fax# [phone]

## 2025-07-23 ENCOUNTER — OFFICE VISIT (OUTPATIENT)
Age: 75
End: 2025-07-23

## 2025-07-23 VITALS — HEIGHT: 60 IN | HEART RATE: 64 BPM | OXYGEN SATURATION: 98 % | BODY MASS INDEX: 40.64 KG/M2 | WEIGHT: 207 LBS

## 2025-07-23 DIAGNOSIS — Z51.89 VISIT FOR WOUND CARE: Primary | ICD-10-CM

## 2025-07-23 DIAGNOSIS — I89.0 LYMPHEDEMA: ICD-10-CM

## 2025-07-23 DIAGNOSIS — L97.911 ULCER OF RIGHT LOWER EXTREMITY, LIMITED TO BREAKDOWN OF SKIN (HCC): ICD-10-CM

## 2025-07-24 NOTE — PROGRESS NOTES
[facility]  [address]  [address]  [place] [address]  Dept: [phone]           Chart reviewed for the following:   Adolfo DIA, have reviewed the medications and updated the allergic reactions for Ashley Pineda       Wound Assessment:     Wound location: outside of the lower right leg, and back of the left lower leg    Wound Size: 2.4 cm x 1.3 cm . (Lateral right leg)  1.5cm x 0.8cm (back left leg)    Tissue characteristics:  red in color, mild odor, some dry skin around the wound    Patient compliant with treatment?   [x] Yes  [] No     Wound image:              TIME OUT performed immediately prior to start of procedure:   Adolfo DIA, have performed the following reviews on Ashley Pineda prior to the start of the procedure:            * Patient was identified by name and date of birth   * Plan for unna boot to be removed and reapplied today  * Procedure site verified   * Patient was positioned for comfort  * Verbal consent was given by patient      Description of Procedure:       Old dressings removed and leg cleansed with warm water and soap. Skin dried thoroughly  adaptic, and hydrofera blue.  applied to the wound bed. Aquaphor  to the periwound area  nothing applied for absorption little to no drainage.  Unna boot applied from base of toes to the knee as follows: Unna boot applied to the right and left legs.   - Unna-Z stretch zinc paste bandage with calamine  - Kerlex  - Coban    Procedure performed by:  Adolfo Barnes CMA    Patient tolerated procedure patient tolerated well    Comments: patient concern about how dry her skin is. We use aquaphor to help with the dryness      Wound care provided under my direct supervision.       Naomie Frost MD PhD  Vascular Surgery

## 2025-08-28 ENCOUNTER — OFFICE VISIT (OUTPATIENT)
Age: 75
End: 2025-08-28
Payer: MEDICARE

## 2025-08-28 VITALS
DIASTOLIC BLOOD PRESSURE: 62 MMHG | HEART RATE: 78 BPM | HEIGHT: 60 IN | BODY MASS INDEX: 40.84 KG/M2 | SYSTOLIC BLOOD PRESSURE: 118 MMHG | WEIGHT: 208 LBS | OXYGEN SATURATION: 96 %

## 2025-08-28 DIAGNOSIS — I89.0 LYMPHEDEMA: Primary | ICD-10-CM

## 2025-08-28 DIAGNOSIS — L97.911 ULCER OF RIGHT LOWER EXTREMITY, LIMITED TO BREAKDOWN OF SKIN (HCC): ICD-10-CM

## 2025-08-28 PROCEDURE — 1036F TOBACCO NON-USER: CPT | Performed by: SURGERY

## 2025-08-28 PROCEDURE — 99213 OFFICE O/P EST LOW 20 MIN: CPT | Performed by: SURGERY

## 2025-08-28 PROCEDURE — 1160F RVW MEDS BY RX/DR IN RCRD: CPT | Performed by: SURGERY

## 2025-08-28 PROCEDURE — 3017F COLORECTAL CA SCREEN DOC REV: CPT | Performed by: SURGERY

## 2025-08-28 PROCEDURE — 3074F SYST BP LT 130 MM HG: CPT | Performed by: SURGERY

## 2025-08-28 PROCEDURE — 3078F DIAST BP <80 MM HG: CPT | Performed by: SURGERY

## 2025-08-28 PROCEDURE — 1159F MED LIST DOCD IN RCRD: CPT | Performed by: SURGERY

## 2025-08-28 PROCEDURE — 1126F AMNT PAIN NOTED NONE PRSNT: CPT | Performed by: SURGERY

## 2025-08-28 PROCEDURE — G8427 DOCREV CUR MEDS BY ELIG CLIN: HCPCS | Performed by: SURGERY

## 2025-08-28 PROCEDURE — G8399 PT W/DXA RESULTS DOCUMENT: HCPCS | Performed by: SURGERY

## 2025-08-28 PROCEDURE — 1090F PRES/ABSN URINE INCON ASSESS: CPT | Performed by: SURGERY

## 2025-08-28 PROCEDURE — G8417 CALC BMI ABV UP PARAM F/U: HCPCS | Performed by: SURGERY

## 2025-08-28 PROCEDURE — 1123F ACP DISCUSS/DSCN MKR DOCD: CPT | Performed by: SURGERY
